# Patient Record
Sex: FEMALE | Race: WHITE | NOT HISPANIC OR LATINO | Employment: OTHER | URBAN - METROPOLITAN AREA
[De-identification: names, ages, dates, MRNs, and addresses within clinical notes are randomized per-mention and may not be internally consistent; named-entity substitution may affect disease eponyms.]

---

## 2017-01-01 ENCOUNTER — APPOINTMENT (INPATIENT)
Dept: NON INVASIVE DIAGNOSTICS | Facility: HOSPITAL | Age: 82
DRG: 907 | End: 2017-01-01
Payer: MEDICARE

## 2017-01-01 ENCOUNTER — GENERIC CONVERSION - ENCOUNTER (OUTPATIENT)
Dept: OTHER | Facility: OTHER | Age: 82
End: 2017-01-01

## 2017-01-01 ENCOUNTER — HOSPITAL ENCOUNTER (OUTPATIENT)
Dept: NON INVASIVE DIAGNOSTICS | Facility: CLINIC | Age: 82
Discharge: HOME | End: 2017-06-14
Payer: MEDICARE

## 2017-01-01 ENCOUNTER — APPOINTMENT (INPATIENT)
Dept: RADIOLOGY | Facility: HOSPITAL | Age: 82
DRG: 907 | End: 2017-01-01
Payer: MEDICARE

## 2017-01-01 ENCOUNTER — APPOINTMENT (EMERGENCY)
Dept: RADIOLOGY | Facility: HOSPITAL | Age: 82
DRG: 907 | End: 2017-01-01
Payer: MEDICARE

## 2017-01-01 ENCOUNTER — HOSPITAL ENCOUNTER (INPATIENT)
Facility: HOSPITAL | Age: 82
LOS: 7 days | DRG: 907 | End: 2017-09-01
Attending: EMERGENCY MEDICINE | Admitting: EMERGENCY MEDICINE
Payer: MEDICARE

## 2017-01-01 ENCOUNTER — ALLSCRIPTS OFFICE VISIT (OUTPATIENT)
Dept: OTHER | Facility: OTHER | Age: 82
End: 2017-01-01

## 2017-01-01 ENCOUNTER — HOSPITAL ENCOUNTER (OUTPATIENT)
Facility: HOSPITAL | Age: 82
Setting detail: OBSERVATION
Discharge: HOME HEALTH | End: 2017-04-21
Attending: EMERGENCY MEDICINE | Admitting: INTERNAL MEDICINE
Payer: MEDICARE

## 2017-01-01 ENCOUNTER — HOSPITAL ENCOUNTER (EMERGENCY)
Facility: HOSPITAL | Age: 82
Discharge: HOME | End: 2017-05-25
Attending: EMERGENCY MEDICINE | Admitting: EMERGENCY MEDICINE
Payer: MEDICARE

## 2017-01-01 ENCOUNTER — TELEPHONE (OUTPATIENT)
Dept: INPATIENT UNIT | Facility: HOSPITAL | Age: 82
End: 2017-01-01

## 2017-01-01 ENCOUNTER — HOSPITAL ENCOUNTER (OUTPATIENT)
Dept: NON INVASIVE DIAGNOSTICS | Facility: HOSPITAL | Age: 82
Discharge: HOME | End: 2017-08-22
Attending: INTERNAL MEDICINE | Admitting: INTERNAL MEDICINE
Payer: MEDICARE

## 2017-01-01 ENCOUNTER — HOSPITAL ENCOUNTER (OUTPATIENT)
Dept: NON INVASIVE DIAGNOSTICS | Facility: CLINIC | Age: 82
Discharge: HOME | End: 2017-06-01
Payer: MEDICARE

## 2017-01-01 ENCOUNTER — ANESTHESIA EVENT (INPATIENT)
Dept: PERIOP | Facility: HOSPITAL | Age: 82
DRG: 907 | End: 2017-01-01
Payer: MEDICARE

## 2017-01-01 ENCOUNTER — ANESTHESIA (INPATIENT)
Dept: PERIOP | Facility: HOSPITAL | Age: 82
DRG: 907 | End: 2017-01-01
Payer: MEDICARE

## 2017-01-01 ENCOUNTER — APPOINTMENT (EMERGENCY)
Dept: RADIOLOGY | Facility: HOSPITAL | Age: 82
End: 2017-01-01
Payer: MEDICARE

## 2017-01-01 VITALS
SYSTOLIC BLOOD PRESSURE: 119 MMHG | HEIGHT: 62 IN | TEMPERATURE: 97.6 F | DIASTOLIC BLOOD PRESSURE: 51 MMHG | WEIGHT: 141.09 LBS | BODY MASS INDEX: 25.96 KG/M2 | RESPIRATION RATE: 16 BRPM | OXYGEN SATURATION: 92 % | HEART RATE: 65 BPM

## 2017-01-01 VITALS
OXYGEN SATURATION: 94 % | WEIGHT: 136 LBS | HEART RATE: 50 BPM | TEMPERATURE: 96.2 F | BODY MASS INDEX: 25.03 KG/M2 | SYSTOLIC BLOOD PRESSURE: 151 MMHG | DIASTOLIC BLOOD PRESSURE: 59 MMHG | HEIGHT: 62 IN | RESPIRATION RATE: 18 BRPM

## 2017-01-01 VITALS
HEART RATE: 64 BPM | BODY MASS INDEX: 26.05 KG/M2 | TEMPERATURE: 98.2 F | WEIGHT: 141.54 LBS | RESPIRATION RATE: 19 BRPM | SYSTOLIC BLOOD PRESSURE: 132 MMHG | DIASTOLIC BLOOD PRESSURE: 35 MMHG | OXYGEN SATURATION: 95 % | HEIGHT: 62 IN

## 2017-01-01 VITALS
TEMPERATURE: 97.6 F | BODY MASS INDEX: 26.1 KG/M2 | WEIGHT: 142.7 LBS | DIASTOLIC BLOOD PRESSURE: 62 MMHG | RESPIRATION RATE: 18 BRPM | HEART RATE: 51 BPM | OXYGEN SATURATION: 93 % | SYSTOLIC BLOOD PRESSURE: 161 MMHG

## 2017-01-01 DIAGNOSIS — I48.0 PAF (PAROXYSMAL ATRIAL FIBRILLATION) (HCC): Chronic | ICD-10-CM

## 2017-01-01 DIAGNOSIS — I38 ENDOCARDITIS: ICD-10-CM

## 2017-01-01 DIAGNOSIS — I25.10 3-VESSEL CAD: ICD-10-CM

## 2017-01-01 DIAGNOSIS — I50.9 HEART FAILURE (HCC): ICD-10-CM

## 2017-01-01 DIAGNOSIS — I21.4 NSTEMI (NON-ST ELEVATED MYOCARDIAL INFARCTION) (HCC): Primary | ICD-10-CM

## 2017-01-01 DIAGNOSIS — D64.9 ANEMIA: ICD-10-CM

## 2017-01-01 DIAGNOSIS — R06.00 DYSPNEA: ICD-10-CM

## 2017-01-01 DIAGNOSIS — R53.83 FATIGUE: ICD-10-CM

## 2017-01-01 DIAGNOSIS — R05.9 COUGH: Primary | ICD-10-CM

## 2017-01-01 DIAGNOSIS — Z45.2 NEEDS PERIPHERALLY INSERTED CENTRAL CATHETER (PICC): ICD-10-CM

## 2017-01-01 DIAGNOSIS — R07.9 CHEST PAIN: ICD-10-CM

## 2017-01-01 DIAGNOSIS — J44.9 CHRONIC OBSTRUCTIVE PULMONARY DISEASE (HCC): ICD-10-CM

## 2017-01-01 DIAGNOSIS — I49.5 SSS (SICK SINUS SYNDROME) (HCC): ICD-10-CM

## 2017-01-01 DIAGNOSIS — I35.0 NONRHEUMATIC AORTIC VALVE STENOSIS: Chronic | ICD-10-CM

## 2017-01-01 DIAGNOSIS — R10.9 ABDOMINAL PAIN: ICD-10-CM

## 2017-01-01 DIAGNOSIS — J44.9 COPD (CHRONIC OBSTRUCTIVE PULMONARY DISEASE) (HCC): ICD-10-CM

## 2017-01-01 DIAGNOSIS — I25.10 ATHEROSCLEROTIC HEART DISEASE OF NATIVE CORONARY ARTERY WITHOUT ANGINA PECTORIS: ICD-10-CM

## 2017-01-01 DIAGNOSIS — J39.9: ICD-10-CM

## 2017-01-01 DIAGNOSIS — N17.9 AKI (ACUTE KIDNEY INJURY) (HCC): ICD-10-CM

## 2017-01-01 DIAGNOSIS — D62 ACUTE BLOOD LOSS ANEMIA: ICD-10-CM

## 2017-01-01 DIAGNOSIS — I35.0 NONRHEUMATIC AORTIC VALVE STENOSIS: Primary | Chronic | ICD-10-CM

## 2017-01-01 DIAGNOSIS — R58 RETROPERITONEAL HEMORRHAGE: Primary | ICD-10-CM

## 2017-01-01 DIAGNOSIS — R57.1 HYPOVOLEMIC SHOCK (HCC): ICD-10-CM

## 2017-01-01 LAB
ABO GROUP BLD BPU: NORMAL
ABO GROUP BLD: NORMAL
ABO GROUP BLD: NORMAL
ALBUMIN SERPL BCP-MCNC: 2.3 G/DL (ref 3.5–5)
ALBUMIN SERPL BCP-MCNC: 2.8 G/DL (ref 3.5–5)
ALP SERPL-CCNC: 104 U/L (ref 46–116)
ALP SERPL-CCNC: 121 U/L (ref 46–116)
ALT SERPL W P-5'-P-CCNC: 10 U/L (ref 12–78)
ALT SERPL W P-5'-P-CCNC: 87 U/L (ref 12–78)
ANION GAP SERPL CALCULATED.3IONS-SCNC: 12 MMOL/L (ref 4–13)
ANION GAP SERPL CALCULATED.3IONS-SCNC: 4 MMOL/L (ref 4–13)
ANION GAP SERPL CALCULATED.3IONS-SCNC: 5 MMOL/L (ref 4–13)
ANION GAP SERPL CALCULATED.3IONS-SCNC: 6 MMOL/L (ref 4–13)
ANION GAP SERPL CALCULATED.3IONS-SCNC: 7 MMOL/L (ref 4–13)
ANION GAP SERPL CALCULATED.3IONS-SCNC: 7 MMOL/L (ref 4–13)
ANION GAP SERPL CALCULATED.3IONS-SCNC: 8 MMOL/L (ref 4–13)
ANISOCYTOSIS BLD QL SMEAR: PRESENT
APTT PPP: 102 SECONDS (ref 23–35)
APTT PPP: 102 SECONDS (ref 23–35)
APTT PPP: 108 SECONDS (ref 23–35)
APTT PPP: 113 SECONDS (ref 23–35)
APTT PPP: 171 SECONDS (ref 23–35)
APTT PPP: 28 SECONDS (ref 23–35)
APTT PPP: 44 SECONDS (ref 23–35)
APTT PPP: 48 SECONDS (ref 23–35)
APTT PPP: 52 SECONDS (ref 23–35)
APTT PPP: 58 SECONDS (ref 23–35)
APTT PPP: 62 SECONDS (ref 23–35)
APTT PPP: 64 SECONDS (ref 23–35)
APTT PPP: 66 SECONDS (ref 23–35)
APTT PPP: 71 SECONDS (ref 23–35)
APTT PPP: 78 SECONDS (ref 23–35)
APTT PPP: 86 SECONDS (ref 23–35)
APTT PPP: >210 SECONDS (ref 23–35)
ARTERIAL PATENCY WRIST A: YES
AST SERPL W P-5'-P-CCNC: 19 U/L (ref 5–45)
AST SERPL W P-5'-P-CCNC: 205 U/L (ref 5–45)
ATRIAL RATE: 0 BPM
ATRIAL RATE: 47 BPM
ATRIAL RATE: 49 BPM
ATRIAL RATE: 59 BPM
ATRIAL RATE: 67 BPM
ATRIAL RATE: 83 BPM
ATRIAL RATE: 83 BPM
BASE EXCESS BLDA CALC-SCNC: -4.4 MMOL/L
BASE EXCESS BLDA CALC-SCNC: 4 MMOL/L (ref -2–3)
BASE EXCESS BLDA CALC-SCNC: 4.5 MMOL/L
BASOPHILS # BLD AUTO: 0.03 THOUSANDS/ΜL (ref 0–0.1)
BASOPHILS # BLD AUTO: 0.04 THOUSANDS/ΜL (ref 0–0.1)
BASOPHILS # BLD AUTO: 0.05 THOUSANDS/ΜL (ref 0–0.1)
BASOPHILS # BLD AUTO: 0.05 THOUSANDS/ΜL (ref 0–0.1)
BASOPHILS # BLD AUTO: 0.06 THOUSANDS/ΜL (ref 0–0.1)
BASOPHILS # BLD AUTO: 0.07 THOUSANDS/ΜL (ref 0–0.1)
BASOPHILS # BLD AUTO: 0.07 THOUSANDS/ΜL (ref 0–0.1)
BASOPHILS # BLD AUTO: 0.08 THOUSANDS/ΜL (ref 0–0.1)
BASOPHILS # BLD MANUAL: 0 THOUSAND/UL (ref 0–0.1)
BASOPHILS NFR BLD AUTO: 1 % (ref 0–1)
BASOPHILS NFR MAR MANUAL: 0 % (ref 0–1)
BILIRUB SERPL-MCNC: 0.57 MG/DL (ref 0.2–1)
BILIRUB SERPL-MCNC: 0.77 MG/DL (ref 0.2–1)
BLD GP AB SCN SERPL QL: NEGATIVE
BLD GP AB SCN SERPL QL: NEGATIVE
BPU ID: NORMAL
BUN SERPL-MCNC: 26 MG/DL (ref 5–25)
BUN SERPL-MCNC: 29 MG/DL (ref 5–25)
BUN SERPL-MCNC: 30 MG/DL (ref 5–25)
BUN SERPL-MCNC: 30 MG/DL (ref 5–25)
BUN SERPL-MCNC: 31 MG/DL (ref 5–25)
BUN SERPL-MCNC: 31 MG/DL (ref 5–25)
BUN SERPL-MCNC: 33 MG/DL (ref 5–25)
BUN SERPL-MCNC: 34 MG/DL (ref 5–25)
BUN SERPL-MCNC: 34 MG/DL (ref 5–25)
BUN SERPL-MCNC: 35 MG/DL (ref 5–25)
BUN SERPL-MCNC: 37 MG/DL (ref 5–25)
BUN SERPL-MCNC: 41 MG/DL (ref 5–25)
BUN SERPL-MCNC: 41 MG/DL (ref 5–25)
BUN SERPL-MCNC: 46 MG/DL (ref 5–25)
BURR CELLS BLD QL SMEAR: PRESENT
CA-I BLD-SCNC: 1.1 MMOL/L (ref 1.12–1.32)
CA-I BLD-SCNC: 1.19 MMOL/L (ref 1.12–1.32)
CALCIUM SERPL-MCNC: 7.5 MG/DL (ref 8.3–10.1)
CALCIUM SERPL-MCNC: 7.7 MG/DL (ref 8.3–10.1)
CALCIUM SERPL-MCNC: 7.9 MG/DL (ref 8.3–10.1)
CALCIUM SERPL-MCNC: 8 MG/DL (ref 8.3–10.1)
CALCIUM SERPL-MCNC: 8.3 MG/DL (ref 8.3–10.1)
CALCIUM SERPL-MCNC: 8.5 MG/DL (ref 8.3–10.1)
CALCIUM SERPL-MCNC: 8.6 MG/DL (ref 8.3–10.1)
CALCIUM SERPL-MCNC: 8.7 MG/DL (ref 8.3–10.1)
CALCIUM SERPL-MCNC: 8.7 MG/DL (ref 8.3–10.1)
CALCIUM SERPL-MCNC: 8.8 MG/DL (ref 8.3–10.1)
CALCIUM SERPL-MCNC: 8.9 MG/DL (ref 8.3–10.1)
CALCIUM SERPL-MCNC: 9 MG/DL (ref 8.3–10.1)
CHEST PAIN STATEMENT: NORMAL
CHLORIDE SERPL-SCNC: 101 MMOL/L (ref 100–108)
CHLORIDE SERPL-SCNC: 101 MMOL/L (ref 100–108)
CHLORIDE SERPL-SCNC: 103 MMOL/L (ref 100–108)
CHLORIDE SERPL-SCNC: 104 MMOL/L (ref 100–108)
CHLORIDE SERPL-SCNC: 104 MMOL/L (ref 100–108)
CHLORIDE SERPL-SCNC: 105 MMOL/L (ref 100–108)
CHLORIDE SERPL-SCNC: 106 MMOL/L (ref 100–108)
CHLORIDE SERPL-SCNC: 107 MMOL/L (ref 100–108)
CHLORIDE SERPL-SCNC: 108 MMOL/L (ref 100–108)
CHLORIDE SERPL-SCNC: 108 MMOL/L (ref 100–108)
CHLORIDE SERPL-SCNC: 98 MMOL/L (ref 100–108)
CHOLEST SERPL-MCNC: 133 MG/DL (ref 50–200)
CO2 SERPL-SCNC: 26 MMOL/L (ref 21–32)
CO2 SERPL-SCNC: 28 MMOL/L (ref 21–32)
CO2 SERPL-SCNC: 28 MMOL/L (ref 21–32)
CO2 SERPL-SCNC: 29 MMOL/L (ref 21–32)
CO2 SERPL-SCNC: 30 MMOL/L (ref 21–32)
CO2 SERPL-SCNC: 31 MMOL/L (ref 21–32)
CO2 SERPL-SCNC: 32 MMOL/L (ref 21–32)
CO2 SERPL-SCNC: 33 MMOL/L (ref 21–32)
CO2 SERPL-SCNC: 33 MMOL/L (ref 21–32)
CO2 SERPL-SCNC: 36 MMOL/L (ref 21–32)
CREAT SERPL-MCNC: 1.25 MG/DL (ref 0.6–1.3)
CREAT SERPL-MCNC: 1.25 MG/DL (ref 0.6–1.3)
CREAT SERPL-MCNC: 1.3 MG/DL (ref 0.6–1.3)
CREAT SERPL-MCNC: 1.33 MG/DL (ref 0.6–1.3)
CREAT SERPL-MCNC: 1.38 MG/DL (ref 0.6–1.3)
CREAT SERPL-MCNC: 1.47 MG/DL (ref 0.6–1.3)
CREAT SERPL-MCNC: 1.51 MG/DL (ref 0.6–1.3)
CREAT SERPL-MCNC: 1.51 MG/DL (ref 0.6–1.3)
CREAT SERPL-MCNC: 1.55 MG/DL (ref 0.6–1.3)
CREAT SERPL-MCNC: 1.59 MG/DL (ref 0.6–1.3)
CREAT SERPL-MCNC: 1.59 MG/DL (ref 0.6–1.3)
CREAT SERPL-MCNC: 1.62 MG/DL (ref 0.6–1.3)
CREAT SERPL-MCNC: 1.63 MG/DL (ref 0.6–1.3)
CREAT SERPL-MCNC: 1.64 MG/DL (ref 0.6–1.3)
CREAT SERPL-MCNC: 1.77 MG/DL (ref 0.6–1.3)
CREAT SERPL-MCNC: 1.86 MG/DL (ref 0.6–1.3)
CROSSMATCH: NORMAL
DS:DELIVERY SYSTEM: 1
EOSINOPHIL # BLD AUTO: 0.16 THOUSAND/ΜL (ref 0–0.61)
EOSINOPHIL # BLD AUTO: 0.19 THOUSAND/ΜL (ref 0–0.61)
EOSINOPHIL # BLD AUTO: 0.26 THOUSAND/ΜL (ref 0–0.61)
EOSINOPHIL # BLD AUTO: 0.3 THOUSAND/ΜL (ref 0–0.61)
EOSINOPHIL # BLD AUTO: 0.35 THOUSAND/ΜL (ref 0–0.61)
EOSINOPHIL # BLD AUTO: 0.39 THOUSAND/ΜL (ref 0–0.61)
EOSINOPHIL # BLD AUTO: 0.46 THOUSAND/ΜL (ref 0–0.61)
EOSINOPHIL # BLD AUTO: 0.5 THOUSAND/ΜL (ref 0–0.61)
EOSINOPHIL # BLD MANUAL: 0.21 THOUSAND/UL (ref 0–0.4)
EOSINOPHIL NFR BLD AUTO: 2 % (ref 0–6)
EOSINOPHIL NFR BLD AUTO: 3 % (ref 0–6)
EOSINOPHIL NFR BLD AUTO: 3 % (ref 0–6)
EOSINOPHIL NFR BLD AUTO: 4 % (ref 0–6)
EOSINOPHIL NFR BLD AUTO: 6 % (ref 0–6)
EOSINOPHIL NFR BLD AUTO: 6 % (ref 0–6)
EOSINOPHIL NFR BLD AUTO: 7 % (ref 0–6)
EOSINOPHIL NFR BLD AUTO: 7 % (ref 0–6)
EOSINOPHIL NFR BLD MANUAL: 2 % (ref 0–6)
ERYTHROCYTE [DISTWIDTH] IN BLOOD BY AUTOMATED COUNT: 14.5 % (ref 11.6–15.1)
ERYTHROCYTE [DISTWIDTH] IN BLOOD BY AUTOMATED COUNT: 14.6 % (ref 11.6–15.1)
ERYTHROCYTE [DISTWIDTH] IN BLOOD BY AUTOMATED COUNT: 14.9 % (ref 11.6–15.1)
ERYTHROCYTE [DISTWIDTH] IN BLOOD BY AUTOMATED COUNT: 15 % (ref 11.6–15.1)
ERYTHROCYTE [DISTWIDTH] IN BLOOD BY AUTOMATED COUNT: 15 % (ref 11.6–15.1)
ERYTHROCYTE [DISTWIDTH] IN BLOOD BY AUTOMATED COUNT: 15.1 % (ref 11.6–15.1)
ERYTHROCYTE [DISTWIDTH] IN BLOOD BY AUTOMATED COUNT: 15.2 % (ref 11.6–15.1)
ERYTHROCYTE [DISTWIDTH] IN BLOOD BY AUTOMATED COUNT: 15.3 % (ref 11.6–15.1)
ERYTHROCYTE [DISTWIDTH] IN BLOOD BY AUTOMATED COUNT: 15.3 % (ref 11.6–15.1)
ERYTHROCYTE [DISTWIDTH] IN BLOOD BY AUTOMATED COUNT: 15.5 % (ref 11.6–15.1)
ERYTHROCYTE [DISTWIDTH] IN BLOOD BY AUTOMATED COUNT: 15.9 % (ref 11.6–15.1)
ERYTHROCYTE [DISTWIDTH] IN BLOOD BY AUTOMATED COUNT: 15.9 % (ref 11.6–15.1)
ERYTHROCYTE [DISTWIDTH] IN BLOOD BY AUTOMATED COUNT: 16 % (ref 11.6–15.1)
ERYTHROCYTE [DISTWIDTH] IN BLOOD BY AUTOMATED COUNT: 16.2 % (ref 11.6–15.1)
ERYTHROCYTE [DISTWIDTH] IN BLOOD BY AUTOMATED COUNT: 16.3 % (ref 11.6–15.1)
GFR SERPL CREATININE-BSD FRML MDRD: 25.9 ML/MIN/1.73SQ M
GFR SERPL CREATININE-BSD FRML MDRD: 26 ML/MIN/1.73SQ M
GFR SERPL CREATININE-BSD FRML MDRD: 29 ML/MIN/1.73SQ M
GFR SERPL CREATININE-BSD FRML MDRD: 29 ML/MIN/1.73SQ M
GFR SERPL CREATININE-BSD FRML MDRD: 29.9 ML/MIN/1.73SQ M
GFR SERPL CREATININE-BSD FRML MDRD: 30 ML/MIN/1.73SQ M
GFR SERPL CREATININE-BSD FRML MDRD: 30 ML/MIN/1.73SQ M
GFR SERPL CREATININE-BSD FRML MDRD: 31 ML/MIN/1.73SQ M
GFR SERPL CREATININE-BSD FRML MDRD: 32 ML/MIN/1.73SQ M
GFR SERPL CREATININE-BSD FRML MDRD: 32 ML/MIN/1.73SQ M
GFR SERPL CREATININE-BSD FRML MDRD: 33.9 ML/MIN/1.73SQ M
GFR SERPL CREATININE-BSD FRML MDRD: 35 ML/MIN/1.73SQ M
GFR SERPL CREATININE-BSD FRML MDRD: 37 ML/MIN/1.73SQ M
GFR SERPL CREATININE-BSD FRML MDRD: 38 ML/MIN/1.73SQ M
GFR SERPL CREATININE-BSD FRML MDRD: 40 ML/MIN/1.73SQ M
GFR SERPL CREATININE-BSD FRML MDRD: 40 ML/MIN/1.73SQ M
GLUCOSE P FAST SERPL-MCNC: 80 MG/DL (ref 65–99)
GLUCOSE SERPL-MCNC: 102 MG/DL (ref 65–140)
GLUCOSE SERPL-MCNC: 108 MG/DL (ref 65–140)
GLUCOSE SERPL-MCNC: 110 MG/DL (ref 65–140)
GLUCOSE SERPL-MCNC: 111 MG/DL (ref 65–140)
GLUCOSE SERPL-MCNC: 117 MG/DL (ref 65–140)
GLUCOSE SERPL-MCNC: 118 MG/DL (ref 65–140)
GLUCOSE SERPL-MCNC: 119 MG/DL (ref 65–140)
GLUCOSE SERPL-MCNC: 133 MG/DL (ref 65–140)
GLUCOSE SERPL-MCNC: 209 MG/DL (ref 65–140)
GLUCOSE SERPL-MCNC: 320 MG/DL (ref 65–140)
GLUCOSE SERPL-MCNC: 354 MG/DL (ref 65–140)
GLUCOSE SERPL-MCNC: 78 MG/DL (ref 65–140)
GLUCOSE SERPL-MCNC: 80 MG/DL (ref 65–140)
GLUCOSE SERPL-MCNC: 80 MG/DL (ref 65–140)
GLUCOSE SERPL-MCNC: 84 MG/DL (ref 65–140)
GLUCOSE SERPL-MCNC: 85 MG/DL (ref 65–140)
GLUCOSE SERPL-MCNC: 86 MG/DL (ref 65–140)
GLUCOSE SERPL-MCNC: 97 MG/DL (ref 65–140)
GLUCOSE SERPL-MCNC: 97 MG/DL (ref 65–140)
HCO3 BLDA-SCNC: 22 MMOL/L (ref 22–28)
HCO3 BLDA-SCNC: 29.6 MMOL/L (ref 22–28)
HCO3 BLDA-SCNC: 30.8 MMOL/L (ref 22–28)
HCT VFR BLD AUTO: 22.9 % (ref 34.8–46.1)
HCT VFR BLD AUTO: 24.5 % (ref 34.8–46.1)
HCT VFR BLD AUTO: 24.8 % (ref 34.8–46.1)
HCT VFR BLD AUTO: 25.3 % (ref 34.8–46.1)
HCT VFR BLD AUTO: 26 % (ref 34.8–46.1)
HCT VFR BLD AUTO: 26.1 % (ref 34.8–46.1)
HCT VFR BLD AUTO: 26.8 % (ref 34.8–46.1)
HCT VFR BLD AUTO: 27.1 % (ref 34.8–46.1)
HCT VFR BLD AUTO: 27.6 % (ref 34.8–46.1)
HCT VFR BLD AUTO: 28.1 % (ref 34.8–46.1)
HCT VFR BLD AUTO: 29 % (ref 34.8–46.1)
HCT VFR BLD AUTO: 29.3 % (ref 34.8–46.1)
HCT VFR BLD AUTO: 29.3 % (ref 34.8–46.1)
HCT VFR BLD AUTO: 30 % (ref 34.8–46.1)
HCT VFR BLD AUTO: 31.6 % (ref 34.8–46.1)
HCT VFR BLD AUTO: 33.5 % (ref 34.8–46.1)
HCT VFR BLD AUTO: 33.9 % (ref 34.8–46.1)
HCT VFR BLD AUTO: 34.1 % (ref 34.8–46.1)
HCT VFR BLD AUTO: 39.5 % (ref 34.8–46.1)
HCT VFR BLD CALC: 27 % (ref 34.8–46.1)
HDLC SERPL-MCNC: 60 MG/DL (ref 40–60)
HGB BLD-MCNC: 10.4 G/DL (ref 11.5–15.4)
HGB BLD-MCNC: 10.8 G/DL (ref 11.5–15.4)
HGB BLD-MCNC: 10.9 G/DL (ref 11.5–15.4)
HGB BLD-MCNC: 11.1 G/DL (ref 11.5–15.4)
HGB BLD-MCNC: 12.1 G/DL (ref 11.5–15.4)
HGB BLD-MCNC: 7.1 G/DL (ref 11.5–15.4)
HGB BLD-MCNC: 8 G/DL (ref 11.5–15.4)
HGB BLD-MCNC: 8 G/DL (ref 11.5–15.4)
HGB BLD-MCNC: 8.4 G/DL (ref 11.5–15.4)
HGB BLD-MCNC: 8.5 G/DL (ref 11.5–15.4)
HGB BLD-MCNC: 8.5 G/DL (ref 11.5–15.4)
HGB BLD-MCNC: 8.6 G/DL (ref 11.5–15.4)
HGB BLD-MCNC: 8.6 G/DL (ref 11.5–15.4)
HGB BLD-MCNC: 8.7 G/DL (ref 11.5–15.4)
HGB BLD-MCNC: 8.9 G/DL (ref 11.5–15.4)
HGB BLD-MCNC: 9 G/DL (ref 11.5–15.4)
HGB BLD-MCNC: 9.2 G/DL (ref 11.5–15.4)
HGB BLD-MCNC: 9.4 G/DL (ref 11.5–15.4)
HGB BLD-MCNC: 9.6 G/DL (ref 11.5–15.4)
HGB BLDA-MCNC: 9.2 G/DL (ref 11.5–15.4)
HOROWITZ INDEX BLDA+IHG-RTO: 60 MM[HG]
INR PPP: 1.09 (ref 0.86–1.16)
INR PPP: 1.09 (ref 0.86–1.16)
INR PPP: 1.3 (ref 0.86–1.16)
INR PPP: 1.88 (ref 0.86–1.16)
INR PPP: 2 (ref 0.86–1.16)
INR PPP: 2.06 (ref 0.86–1.16)
INR PPP: 2.15 (ref 0.86–1.16)
INR PPP: 2.34 (ref 0.86–1.16)
INR PPP: 2.92 (ref 0.86–1.16)
INR PPP: 3.94 (ref 0.86–1.16)
INR PPP: 3.97 (ref 0.86–1.16)
INR PPP: 4.3 (ref 0.86–1.16)
LACTATE SERPL-SCNC: 1 MMOL/L (ref 0.5–2)
LACTATE SERPL-SCNC: 1.9 MMOL/L (ref 0.5–2)
LDLC SERPL CALC-MCNC: 58 MG/DL (ref 0–100)
LIPASE SERPL-CCNC: 120 U/L (ref 73–393)
LYMPHOCYTES # BLD AUTO: 0.39 THOUSANDS/ΜL (ref 0.6–4.47)
LYMPHOCYTES # BLD AUTO: 0.65 THOUSANDS/ΜL (ref 0.6–4.47)
LYMPHOCYTES # BLD AUTO: 0.65 THOUSANDS/ΜL (ref 0.6–4.47)
LYMPHOCYTES # BLD AUTO: 0.67 THOUSANDS/ΜL (ref 0.6–4.47)
LYMPHOCYTES # BLD AUTO: 0.72 THOUSANDS/ΜL (ref 0.6–4.47)
LYMPHOCYTES # BLD AUTO: 0.76 THOUSANDS/ΜL (ref 0.6–4.47)
LYMPHOCYTES # BLD AUTO: 0.87 THOUSANDS/ΜL (ref 0.6–4.47)
LYMPHOCYTES # BLD AUTO: 1.37 THOUSANDS/ΜL (ref 0.6–4.47)
LYMPHOCYTES # BLD AUTO: 3.61 THOUSAND/UL (ref 0.6–4.47)
LYMPHOCYTES # BLD AUTO: 34 % (ref 14–44)
LYMPHOCYTES NFR BLD AUTO: 10 % (ref 14–44)
LYMPHOCYTES NFR BLD AUTO: 11 % (ref 14–44)
LYMPHOCYTES NFR BLD AUTO: 12 % (ref 14–44)
LYMPHOCYTES NFR BLD AUTO: 14 % (ref 14–44)
LYMPHOCYTES NFR BLD AUTO: 20 % (ref 14–44)
LYMPHOCYTES NFR BLD AUTO: 4 % (ref 14–44)
LYMPHOCYTES NFR BLD AUTO: 7 % (ref 14–44)
LYMPHOCYTES NFR BLD AUTO: 9 % (ref 14–44)
MAGNESIUM SERPL-MCNC: 2.3 MG/DL (ref 1.6–2.6)
MAGNESIUM SERPL-MCNC: 2.4 MG/DL (ref 1.6–2.6)
MAGNESIUM SERPL-MCNC: 2.5 MG/DL (ref 1.6–2.6)
MAGNESIUM SERPL-MCNC: 2.6 MG/DL (ref 1.6–2.6)
MAGNESIUM SERPL-MCNC: 2.7 MG/DL (ref 1.6–2.6)
MAX DIASTOLIC BP: 68 MMHG
MAX HEART RATE: 60 BPM
MAX PREDICTED HEART RATE: 137 BPM
MAX. SYSTOLIC BP: 144 MMHG
MCH RBC QN AUTO: 28.4 PG (ref 26.8–34.3)
MCH RBC QN AUTO: 29 PG (ref 26.8–34.3)
MCH RBC QN AUTO: 29.5 PG (ref 26.8–34.3)
MCH RBC QN AUTO: 29.5 PG (ref 26.8–34.3)
MCH RBC QN AUTO: 29.7 PG (ref 26.8–34.3)
MCH RBC QN AUTO: 29.8 PG (ref 26.8–34.3)
MCH RBC QN AUTO: 29.9 PG (ref 26.8–34.3)
MCH RBC QN AUTO: 30 PG (ref 26.8–34.3)
MCH RBC QN AUTO: 30.2 PG (ref 26.8–34.3)
MCH RBC QN AUTO: 30.3 PG (ref 26.8–34.3)
MCH RBC QN AUTO: 30.4 PG (ref 26.8–34.3)
MCH RBC QN AUTO: 30.5 PG (ref 26.8–34.3)
MCH RBC QN AUTO: 30.5 PG (ref 26.8–34.3)
MCHC RBC AUTO-ENTMCNC: 30.6 G/DL (ref 31.4–37.4)
MCHC RBC AUTO-ENTMCNC: 30.7 G/DL (ref 31.4–37.4)
MCHC RBC AUTO-ENTMCNC: 31 G/DL (ref 31.4–37.4)
MCHC RBC AUTO-ENTMCNC: 31.2 G/DL (ref 31.4–37.4)
MCHC RBC AUTO-ENTMCNC: 31.3 G/DL (ref 31.4–37.4)
MCHC RBC AUTO-ENTMCNC: 31.7 G/DL (ref 31.4–37.4)
MCHC RBC AUTO-ENTMCNC: 31.7 G/DL (ref 31.4–37.4)
MCHC RBC AUTO-ENTMCNC: 32.1 G/DL (ref 31.4–37.4)
MCHC RBC AUTO-ENTMCNC: 32.2 G/DL (ref 31.4–37.4)
MCHC RBC AUTO-ENTMCNC: 32.2 G/DL (ref 31.4–37.4)
MCHC RBC AUTO-ENTMCNC: 32.3 G/DL (ref 31.4–37.4)
MCHC RBC AUTO-ENTMCNC: 32.6 G/DL (ref 31.4–37.4)
MCHC RBC AUTO-ENTMCNC: 32.7 G/DL (ref 31.4–37.4)
MCHC RBC AUTO-ENTMCNC: 32.9 G/DL (ref 31.4–37.4)
MCHC RBC AUTO-ENTMCNC: 33.5 G/DL (ref 31.4–37.4)
MCV RBC AUTO: 91 FL (ref 82–98)
MCV RBC AUTO: 91 FL (ref 82–98)
MCV RBC AUTO: 92 FL (ref 82–98)
MCV RBC AUTO: 92 FL (ref 82–98)
MCV RBC AUTO: 93 FL (ref 82–98)
MCV RBC AUTO: 94 FL (ref 82–98)
MCV RBC AUTO: 95 FL (ref 82–98)
MCV RBC AUTO: 95 FL (ref 82–98)
MCV RBC AUTO: 98 FL (ref 82–98)
MCV RBC AUTO: 99 FL (ref 82–98)
MONOCYTES # BLD AUTO: 0.11 THOUSAND/UL (ref 0–1.22)
MONOCYTES # BLD AUTO: 0.53 THOUSAND/ΜL (ref 0.17–1.22)
MONOCYTES # BLD AUTO: 0.59 THOUSAND/ΜL (ref 0.17–1.22)
MONOCYTES # BLD AUTO: 0.61 THOUSAND/ΜL (ref 0.17–1.22)
MONOCYTES # BLD AUTO: 0.65 THOUSAND/ΜL (ref 0.17–1.22)
MONOCYTES # BLD AUTO: 0.69 THOUSAND/ΜL (ref 0.17–1.22)
MONOCYTES # BLD AUTO: 0.8 THOUSAND/ΜL (ref 0.17–1.22)
MONOCYTES # BLD AUTO: 0.94 THOUSAND/ΜL (ref 0.17–1.22)
MONOCYTES # BLD AUTO: 1 THOUSAND/ΜL (ref 0.17–1.22)
MONOCYTES NFR BLD AUTO: 10 % (ref 4–12)
MONOCYTES NFR BLD AUTO: 11 % (ref 4–12)
MONOCYTES NFR BLD AUTO: 11 % (ref 4–12)
MONOCYTES NFR BLD AUTO: 12 % (ref 4–12)
MONOCYTES NFR BLD AUTO: 9 % (ref 4–12)
MONOCYTES NFR BLD: 1 % (ref 4–12)
NEUTROPHILS # BLD AUTO: 3.95 THOUSANDS/ΜL (ref 1.85–7.62)
NEUTROPHILS # BLD AUTO: 4.26 THOUSANDS/ΜL (ref 1.85–7.62)
NEUTROPHILS # BLD AUTO: 4.27 THOUSANDS/ΜL (ref 1.85–7.62)
NEUTROPHILS # BLD AUTO: 4.83 THOUSANDS/ΜL (ref 1.85–7.62)
NEUTROPHILS # BLD AUTO: 5.05 THOUSANDS/ΜL (ref 1.85–7.62)
NEUTROPHILS # BLD AUTO: 5.81 THOUSANDS/ΜL (ref 1.85–7.62)
NEUTROPHILS # BLD AUTO: 6.81 THOUSANDS/ΜL (ref 1.85–7.62)
NEUTROPHILS # BLD AUTO: 7.83 THOUSANDS/ΜL (ref 1.85–7.62)
NEUTROPHILS # BLD MANUAL: 6.69 THOUSAND/UL (ref 1.85–7.62)
NEUTS SEG NFR BLD AUTO: 63 % (ref 43–75)
NEUTS SEG NFR BLD AUTO: 64 % (ref 43–75)
NEUTS SEG NFR BLD AUTO: 70 % (ref 43–75)
NEUTS SEG NFR BLD AUTO: 71 % (ref 43–75)
NEUTS SEG NFR BLD AUTO: 72 % (ref 43–75)
NEUTS SEG NFR BLD AUTO: 72 % (ref 43–75)
NEUTS SEG NFR BLD AUTO: 77 % (ref 43–75)
NEUTS SEG NFR BLD AUTO: 78 % (ref 43–75)
NEUTS SEG NFR BLD AUTO: 83 % (ref 43–75)
NRBC BLD AUTO-RTO: 0 /100 WBCS
NRBC BLD AUTO-RTO: 3 /100 WBCS
NRBC BLD AUTO-RTO: 4 /100 WBC (ref 0–2)
NT-PROBNP SERPL-MCNC: ABNORMAL PG/ML
O2 CT BLDA-SCNC: 12.3 ML/DL (ref 16–23)
O2 CT BLDA-SCNC: 12.7 ML/DL (ref 16–23)
OXYHGB MFR BLDA: 90.4 % (ref 94–97)
OXYHGB MFR BLDA: 94.1 % (ref 94–97)
P AXIS: 107 DEGREES
P AXIS: 44 DEGREES
P AXIS: 56 DEGREES
P AXIS: 76 DEGREES
P AXIS: 93 DEGREES
P AXIS: 95 DEGREES
PCO2 BLD: 33 MMOL/L (ref 21–32)
PCO2 BLD: 70.2 MM HG (ref 36–44)
PCO2 BLDA: 45.9 MM HG (ref 36–44)
PCO2 BLDA: 47.3 MM HG (ref 36–44)
PEEP RESPIRATORY: 5 CM[H2O]
PH BLD: 7.25 [PH] (ref 7.35–7.45)
PH BLDA: 7.3 [PH] (ref 7.35–7.45)
PH BLDA: 7.42 [PH] (ref 7.35–7.45)
PHOSPHATE SERPL-MCNC: 3.1 MG/DL (ref 2.3–4.1)
PHOSPHATE SERPL-MCNC: 3.5 MG/DL (ref 2.3–4.1)
PHOSPHATE SERPL-MCNC: 5.6 MG/DL (ref 2.3–4.1)
PLATELET # BLD AUTO: 143 THOUSANDS/UL (ref 149–390)
PLATELET # BLD AUTO: 159 THOUSANDS/UL (ref 149–390)
PLATELET # BLD AUTO: 161 THOUSANDS/UL (ref 149–390)
PLATELET # BLD AUTO: 178 THOUSANDS/UL (ref 149–390)
PLATELET # BLD AUTO: 180 THOUSANDS/UL (ref 149–390)
PLATELET # BLD AUTO: 189 THOUSANDS/UL (ref 149–390)
PLATELET # BLD AUTO: 202 THOUSANDS/UL (ref 149–390)
PLATELET # BLD AUTO: 207 THOUSANDS/UL (ref 149–390)
PLATELET # BLD AUTO: 211 THOUSANDS/UL (ref 149–390)
PLATELET # BLD AUTO: 211 THOUSANDS/UL (ref 149–390)
PLATELET # BLD AUTO: 219 THOUSANDS/UL (ref 149–390)
PLATELET # BLD AUTO: 254 THOUSANDS/UL (ref 149–390)
PLATELET # BLD AUTO: 260 THOUSANDS/UL (ref 149–390)
PLATELET # BLD AUTO: 271 THOUSANDS/UL (ref 149–390)
PLATELET # BLD AUTO: 75 THOUSANDS/UL (ref 149–390)
PLATELET BLD QL SMEAR: ABNORMAL
PMV BLD AUTO: 8.8 FL (ref 8.9–12.7)
PMV BLD AUTO: 9 FL (ref 8.9–12.7)
PMV BLD AUTO: 9.4 FL (ref 8.9–12.7)
PMV BLD AUTO: 9.5 FL (ref 8.9–12.7)
PMV BLD AUTO: 9.6 FL (ref 8.9–12.7)
PMV BLD AUTO: 9.7 FL (ref 8.9–12.7)
PMV BLD AUTO: 9.7 FL (ref 8.9–12.7)
PMV BLD AUTO: 9.9 FL (ref 8.9–12.7)
PMV BLD AUTO: 9.9 FL (ref 8.9–12.7)
PO2 BLD: 300 MM HG (ref 75–129)
PO2 BLDA: 73.7 MM HG (ref 75–129)
PO2 BLDA: 75.3 MM HG (ref 75–129)
POIKILOCYTOSIS BLD QL SMEAR: PRESENT
POTASSIUM BLD-SCNC: 4.8 MMOL/L (ref 3.5–5.3)
POTASSIUM SERPL-SCNC: 3.7 MMOL/L (ref 3.5–5.3)
POTASSIUM SERPL-SCNC: 3.8 MMOL/L (ref 3.5–5.3)
POTASSIUM SERPL-SCNC: 4 MMOL/L (ref 3.5–5.3)
POTASSIUM SERPL-SCNC: 4.1 MMOL/L (ref 3.5–5.3)
POTASSIUM SERPL-SCNC: 4.2 MMOL/L (ref 3.5–5.3)
POTASSIUM SERPL-SCNC: 4.4 MMOL/L (ref 3.5–5.3)
POTASSIUM SERPL-SCNC: 4.5 MMOL/L (ref 3.5–5.3)
POTASSIUM SERPL-SCNC: 4.5 MMOL/L (ref 3.5–5.3)
POTASSIUM SERPL-SCNC: 4.8 MMOL/L (ref 3.5–5.3)
POTASSIUM SERPL-SCNC: 4.9 MMOL/L (ref 3.5–5.3)
POTASSIUM SERPL-SCNC: 5 MMOL/L (ref 3.5–5.3)
POTASSIUM SERPL-SCNC: 5.1 MMOL/L (ref 3.5–5.3)
PR INTERVAL: 164 MS
PR INTERVAL: 170 MS
PR INTERVAL: 186 MS
PR INTERVAL: 196 MS
PR INTERVAL: 208 MS
PR INTERVAL: 350 MS
PROT SERPL-MCNC: 5.6 G/DL (ref 6.4–8.2)
PROT SERPL-MCNC: 5.9 G/DL (ref 6.4–8.2)
PROTHROMBIN TIME: 14.1 SECONDS (ref 12.1–14.4)
PROTHROMBIN TIME: 14.1 SECONDS (ref 12.1–14.4)
PROTHROMBIN TIME: 16.3 SECONDS (ref 12.1–14.4)
PROTHROMBIN TIME: 21.8 SECONDS (ref 12.1–14.4)
PROTHROMBIN TIME: 22.9 SECONDS (ref 12.1–14.4)
PROTHROMBIN TIME: 23.4 SECONDS (ref 12.1–14.4)
PROTHROMBIN TIME: 24.2 SECONDS (ref 12.1–14.4)
PROTHROMBIN TIME: 25.9 SECONDS (ref 12.1–14.4)
PROTHROMBIN TIME: 30.9 SECONDS (ref 12.1–14.4)
PROTHROMBIN TIME: 37.6 SECONDS (ref 12–14.3)
PROTHROMBIN TIME: 37.8 SECONDS (ref 12–14.3)
PROTHROMBIN TIME: 42 SECONDS (ref 12.1–14.4)
PROTOCOL NAME: NORMAL
QRS AXIS: -12 DEGREES
QRS AXIS: -34 DEGREES
QRS AXIS: -69 DEGREES
QRS AXIS: -7 DEGREES
QRS AXIS: 0 DEGREES
QRS AXIS: 38 DEGREES
QRS AXIS: 39 DEGREES
QRSD INTERVAL: 0 MS
QRSD INTERVAL: 160 MS
QRSD INTERVAL: 168 MS
QRSD INTERVAL: 171 MS
QRSD INTERVAL: 174 MS
QRSD INTERVAL: 178 MS
QRSD INTERVAL: 180 MS
QT INTERVAL: 0 MS
QT INTERVAL: 408 MS
QT INTERVAL: 464 MS
QT INTERVAL: 470 MS
QT INTERVAL: 516 MS
QT INTERVAL: 556 MS
QT INTERVAL: 592 MS
QTC INTERVAL: 0 MS
QTC INTERVAL: 465 MS
QTC INTERVAL: 479 MS
QTC INTERVAL: 489 MS
QTC INTERVAL: 502 MS
QTC INTERVAL: 523 MS
QTC INTERVAL: 539 MS
RBC # BLD AUTO: 2.62 MILLION/UL (ref 3.81–5.12)
RBC # BLD AUTO: 2.65 MILLION/UL (ref 3.81–5.12)
RBC # BLD AUTO: 2.82 MILLION/UL (ref 3.81–5.12)
RBC # BLD AUTO: 2.83 MILLION/UL (ref 3.81–5.12)
RBC # BLD AUTO: 2.86 MILLION/UL (ref 3.81–5.12)
RBC # BLD AUTO: 2.9 MILLION/UL (ref 3.81–5.12)
RBC # BLD AUTO: 2.96 MILLION/UL (ref 3.81–5.12)
RBC # BLD AUTO: 3 MILLION/UL (ref 3.81–5.12)
RBC # BLD AUTO: 3.04 MILLION/UL (ref 3.81–5.12)
RBC # BLD AUTO: 3.19 MILLION/UL (ref 3.81–5.12)
RBC # BLD AUTO: 3.47 MILLION/UL (ref 3.81–5.12)
RBC # BLD AUTO: 3.62 MILLION/UL (ref 3.81–5.12)
RBC # BLD AUTO: 3.69 MILLION/UL (ref 3.81–5.12)
RBC # BLD AUTO: 3.71 MILLION/UL (ref 3.81–5.12)
RBC # BLD AUTO: 4.26 MILLION/UL (ref 3.81–5.12)
RBC MORPH BLD: PRESENT
REASON FOR TERMINATION: NORMAL
RH BLD: POSITIVE
RH BLD: POSITIVE
SAO2 % BLD FROM PO2: 100 % (ref 95–98)
SODIUM BLD-SCNC: 140 MMOL/L (ref 136–145)
SODIUM SERPL-SCNC: 139 MMOL/L (ref 136–145)
SODIUM SERPL-SCNC: 140 MMOL/L (ref 136–145)
SODIUM SERPL-SCNC: 141 MMOL/L (ref 136–145)
SODIUM SERPL-SCNC: 141 MMOL/L (ref 136–145)
SODIUM SERPL-SCNC: 142 MMOL/L (ref 136–145)
SODIUM SERPL-SCNC: 143 MMOL/L (ref 136–145)
SODIUM SERPL-SCNC: 144 MMOL/L (ref 136–145)
SPECIMEN EXPIRATION DATE: NORMAL
SPECIMEN EXPIRATION DATE: NORMAL
SPECIMEN SOURCE: ABNORMAL
SPECIMEN SOURCE: NORMAL
T WAVE AXIS: 0 DEGREES
T WAVE AXIS: 132 DEGREES
T WAVE AXIS: 135 DEGREES
T WAVE AXIS: 155 DEGREES
T WAVE AXIS: 160 DEGREES
T WAVE AXIS: 171 DEGREES
T WAVE AXIS: 199 DEGREES
TARGET HR FORMULA: NORMAL
TEST INDICATION: NORMAL
TIME IN EXERCISE PHASE: 180 S
TRIGL SERPL-MCNC: 73 MG/DL
TROPONIN I BLD-MCNC: 0.06 NG/ML (ref 0–0.08)
TROPONIN I BLD-MCNC: 0.19 NG/ML (ref 0–0.08)
TROPONIN I SERPL-MCNC: 0.1 NG/ML
TROPONIN I SERPL-MCNC: 0.11 NG/ML
TROPONIN I SERPL-MCNC: 0.21 NG/ML
TROPONIN I SERPL-MCNC: 0.23 NG/ML
UNIT DISPENSE STATUS: NORMAL
UNIT PRODUCT CODE: NORMAL
UNIT RH: NORMAL
VENT AC: 20
VENT- AC: AC
VENTRICULAR RATE: 0 BPM
VENTRICULAR RATE: 47 BPM
VENTRICULAR RATE: 49 BPM
VENTRICULAR RATE: 54 BPM
VENTRICULAR RATE: 59 BPM
VENTRICULAR RATE: 81 BPM
VENTRICULAR RATE: 83 BPM
VT SETTING VENT: 450 ML
WBC # BLD AUTO: 10.62 THOUSAND/UL (ref 4.31–10.16)
WBC # BLD AUTO: 11.35 THOUSAND/UL (ref 4.31–10.16)
WBC # BLD AUTO: 5.08 THOUSAND/UL (ref 4.31–10.16)
WBC # BLD AUTO: 5.24 THOUSAND/UL (ref 4.31–10.16)
WBC # BLD AUTO: 5.44 THOUSAND/UL (ref 4.31–10.16)
WBC # BLD AUTO: 5.91 THOUSAND/UL (ref 4.31–10.16)
WBC # BLD AUTO: 6.09 THOUSAND/UL (ref 4.31–10.16)
WBC # BLD AUTO: 6.7 THOUSAND/UL (ref 4.31–10.16)
WBC # BLD AUTO: 6.78 THOUSAND/UL (ref 4.31–10.16)
WBC # BLD AUTO: 6.96 THOUSAND/UL (ref 4.31–10.16)
WBC # BLD AUTO: 7.09 THOUSAND/UL (ref 4.31–10.16)
WBC # BLD AUTO: 7.54 THOUSAND/UL (ref 4.31–10.16)
WBC # BLD AUTO: 8.16 THOUSAND/UL (ref 4.31–10.16)
WBC # BLD AUTO: 8.87 THOUSAND/UL (ref 4.31–10.16)
WBC # BLD AUTO: 9.45 THOUSAND/UL (ref 4.31–10.16)

## 2017-01-01 PROCEDURE — 93005 ELECTROCARDIOGRAM TRACING: CPT | Performed by: EMERGENCY MEDICINE

## 2017-01-01 PROCEDURE — 85730 THROMBOPLASTIN TIME PARTIAL: CPT | Performed by: SURGERY

## 2017-01-01 PROCEDURE — 85610 PROTHROMBIN TIME: CPT | Performed by: EMERGENCY MEDICINE

## 2017-01-01 PROCEDURE — 86901 BLOOD TYPING SEROLOGIC RH(D): CPT | Performed by: EMERGENCY MEDICINE

## 2017-01-01 PROCEDURE — 85027 COMPLETE CBC AUTOMATED: CPT | Performed by: NURSE PRACTITIONER

## 2017-01-01 PROCEDURE — 85007 BL SMEAR W/DIFF WBC COUNT: CPT | Performed by: PHYSICIAN ASSISTANT

## 2017-01-01 PROCEDURE — 85025 COMPLETE CBC W/AUTO DIFF WBC: CPT | Performed by: EMERGENCY MEDICINE

## 2017-01-01 PROCEDURE — 82948 REAGENT STRIP/BLOOD GLUCOSE: CPT

## 2017-01-01 PROCEDURE — 85610 PROTHROMBIN TIME: CPT | Performed by: NURSE PRACTITIONER

## 2017-01-01 PROCEDURE — 80053 COMPREHEN METABOLIC PANEL: CPT | Performed by: STUDENT IN AN ORGANIZED HEALTH CARE EDUCATION/TRAINING PROGRAM

## 2017-01-01 PROCEDURE — 80053 COMPREHEN METABOLIC PANEL: CPT | Performed by: EMERGENCY MEDICINE

## 2017-01-01 PROCEDURE — 93005 ELECTROCARDIOGRAM TRACING: CPT | Performed by: PHYSICIAN ASSISTANT

## 2017-01-01 PROCEDURE — 85014 HEMATOCRIT: CPT

## 2017-01-01 PROCEDURE — G8987 SELF CARE CURRENT STATUS: HCPCS

## 2017-01-01 PROCEDURE — 84132 ASSAY OF SERUM POTASSIUM: CPT

## 2017-01-01 PROCEDURE — 94640 AIRWAY INHALATION TREATMENT: CPT

## 2017-01-01 PROCEDURE — 84484 ASSAY OF TROPONIN QUANT: CPT | Performed by: PHYSICIAN ASSISTANT

## 2017-01-01 PROCEDURE — 04QL0ZZ REPAIR LEFT FEMORAL ARTERY, OPEN APPROACH: ICD-10-PCS | Performed by: SURGERY

## 2017-01-01 PROCEDURE — 80048 BASIC METABOLIC PNL TOTAL CA: CPT | Performed by: INTERNAL MEDICINE

## 2017-01-01 PROCEDURE — 85730 THROMBOPLASTIN TIME PARTIAL: CPT | Performed by: HOSPITALIST

## 2017-01-01 PROCEDURE — 85014 HEMATOCRIT: CPT | Performed by: NURSE PRACTITIONER

## 2017-01-01 PROCEDURE — 36415 COLL VENOUS BLD VENIPUNCTURE: CPT | Performed by: EMERGENCY MEDICINE

## 2017-01-01 PROCEDURE — C1751 CATH, INF, PER/CENT/MIDLINE: HCPCS

## 2017-01-01 PROCEDURE — 71020 HB CHEST X-RAY 2VW FRONTAL&LATL: CPT

## 2017-01-01 PROCEDURE — 85730 THROMBOPLASTIN TIME PARTIAL: CPT | Performed by: INTERNAL MEDICINE

## 2017-01-01 PROCEDURE — 93306 TTE W/DOPPLER COMPLETE: CPT

## 2017-01-01 PROCEDURE — 80048 BASIC METABOLIC PNL TOTAL CA: CPT | Performed by: PHYSICIAN ASSISTANT

## 2017-01-01 PROCEDURE — 97163 PT EVAL HIGH COMPLEX 45 MIN: CPT

## 2017-01-01 PROCEDURE — 84100 ASSAY OF PHOSPHORUS: CPT | Performed by: EMERGENCY MEDICINE

## 2017-01-01 PROCEDURE — 0JH63XZ INSERTION OF TUNNELED VASCULAR ACCESS DEVICE INTO CHEST SUBCUTANEOUS TISSUE AND FASCIA, PERCUTANEOUS APPROACH: ICD-10-PCS | Performed by: SURGERY

## 2017-01-01 PROCEDURE — 85610 PROTHROMBIN TIME: CPT | Performed by: STUDENT IN AN ORGANIZED HEALTH CARE EDUCATION/TRAINING PROGRAM

## 2017-01-01 PROCEDURE — 71020 HB CHEST X-RAY 2VW FRONTAL&LATL: CPT | Performed by: EMERGENCY MEDICINE

## 2017-01-01 PROCEDURE — 71010 HB CHEST X-RAY 1 VIEW FRONTAL (PORTABLE): CPT

## 2017-01-01 PROCEDURE — 85018 HEMOGLOBIN: CPT | Performed by: EMERGENCY MEDICINE

## 2017-01-01 PROCEDURE — 85018 HEMOGLOBIN: CPT | Performed by: NURSE PRACTITIONER

## 2017-01-01 PROCEDURE — 93458 L HRT ARTERY/VENTRICLE ANGIO: CPT | Performed by: INTERNAL MEDICINE

## 2017-01-01 PROCEDURE — G8979 MOBILITY GOAL STATUS: HCPCS

## 2017-01-01 PROCEDURE — 99284 EMERGENCY DEPT VISIT MOD MDM: CPT

## 2017-01-01 PROCEDURE — 86850 RBC ANTIBODY SCREEN: CPT | Performed by: EMERGENCY MEDICINE

## 2017-01-01 PROCEDURE — 94760 N-INVAS EAR/PLS OXIMETRY 1: CPT

## 2017-01-01 PROCEDURE — 86901 BLOOD TYPING SEROLOGIC RH(D): CPT | Performed by: STUDENT IN AN ORGANIZED HEALTH CARE EDUCATION/TRAINING PROGRAM

## 2017-01-01 PROCEDURE — 80048 BASIC METABOLIC PNL TOTAL CA: CPT | Performed by: EMERGENCY MEDICINE

## 2017-01-01 PROCEDURE — 86920 COMPATIBILITY TEST SPIN: CPT

## 2017-01-01 PROCEDURE — 82330 ASSAY OF CALCIUM: CPT

## 2017-01-01 PROCEDURE — 36600 WITHDRAWAL OF ARTERIAL BLOOD: CPT

## 2017-01-01 PROCEDURE — 85027 COMPLETE CBC AUTOMATED: CPT | Performed by: PHYSICIAN ASSISTANT

## 2017-01-01 PROCEDURE — 83735 ASSAY OF MAGNESIUM: CPT | Performed by: EMERGENCY MEDICINE

## 2017-01-01 PROCEDURE — 99152 MOD SED SAME PHYS/QHP 5/>YRS: CPT | Performed by: INTERNAL MEDICINE

## 2017-01-01 PROCEDURE — 36569 INSJ PICC 5 YR+ W/O IMAGING: CPT

## 2017-01-01 PROCEDURE — 80048 BASIC METABOLIC PNL TOTAL CA: CPT | Performed by: SURGERY

## 2017-01-01 PROCEDURE — 82803 BLOOD GASES ANY COMBINATION: CPT

## 2017-01-01 PROCEDURE — 92950 HEART/LUNG RESUSCITATION CPR: CPT

## 2017-01-01 PROCEDURE — 85610 PROTHROMBIN TIME: CPT | Performed by: SURGERY

## 2017-01-01 PROCEDURE — 80048 BASIC METABOLIC PNL TOTAL CA: CPT | Performed by: NURSE PRACTITIONER

## 2017-01-01 PROCEDURE — P9016 RBC LEUKOCYTES REDUCED: HCPCS

## 2017-01-01 PROCEDURE — 83735 ASSAY OF MAGNESIUM: CPT | Performed by: PHYSICIAN ASSISTANT

## 2017-01-01 PROCEDURE — C1769 GUIDE WIRE: HCPCS | Performed by: INTERNAL MEDICINE

## 2017-01-01 PROCEDURE — 96374 THER/PROPH/DIAG INJ IV PUSH: CPT

## 2017-01-01 PROCEDURE — 0WCG0ZZ EXTIRPATION OF MATTER FROM PERITONEAL CAVITY, OPEN APPROACH: ICD-10-PCS | Performed by: SURGERY

## 2017-01-01 PROCEDURE — C1894 INTRO/SHEATH, NON-LASER: HCPCS | Performed by: INTERNAL MEDICINE

## 2017-01-01 PROCEDURE — 84484 ASSAY OF TROPONIN QUANT: CPT

## 2017-01-01 PROCEDURE — 84100 ASSAY OF PHOSPHORUS: CPT | Performed by: PHYSICIAN ASSISTANT

## 2017-01-01 PROCEDURE — 78452 HT MUSCLE IMAGE SPECT MULT: CPT

## 2017-01-01 PROCEDURE — 82947 ASSAY GLUCOSE BLOOD QUANT: CPT

## 2017-01-01 PROCEDURE — 93005 ELECTROCARDIOGRAM TRACING: CPT

## 2017-01-01 PROCEDURE — 99153 MOD SED SAME PHYS/QHP EA: CPT | Performed by: INTERNAL MEDICINE

## 2017-01-01 PROCEDURE — 96375 TX/PRO/DX INJ NEW DRUG ADDON: CPT

## 2017-01-01 PROCEDURE — 93017 CV STRESS TEST TRACING ONLY: CPT

## 2017-01-01 PROCEDURE — 85027 COMPLETE CBC AUTOMATED: CPT | Performed by: EMERGENCY MEDICINE

## 2017-01-01 PROCEDURE — 93225 XTRNL ECG REC<48 HRS REC: CPT

## 2017-01-01 PROCEDURE — 94002 VENT MGMT INPAT INIT DAY: CPT

## 2017-01-01 PROCEDURE — 93971 EXTREMITY STUDY: CPT

## 2017-01-01 PROCEDURE — 85025 COMPLETE CBC W/AUTO DIFF WBC: CPT | Performed by: NURSE PRACTITIONER

## 2017-01-01 PROCEDURE — 83690 ASSAY OF LIPASE: CPT | Performed by: EMERGENCY MEDICINE

## 2017-01-01 PROCEDURE — 0BH17EZ INSERTION OF ENDOTRACHEAL AIRWAY INTO TRACHEA, VIA NATURAL OR ARTIFICIAL OPENING: ICD-10-PCS | Performed by: ANESTHESIOLOGY

## 2017-01-01 PROCEDURE — 85730 THROMBOPLASTIN TIME PARTIAL: CPT | Performed by: EMERGENCY MEDICINE

## 2017-01-01 PROCEDURE — 99285 EMERGENCY DEPT VISIT HI MDM: CPT

## 2017-01-01 PROCEDURE — C9132 KCENTRA, PER I.U.: HCPCS | Performed by: EMERGENCY MEDICINE

## 2017-01-01 PROCEDURE — G8988 SELF CARE GOAL STATUS: HCPCS

## 2017-01-01 PROCEDURE — 93005 ELECTROCARDIOGRAM TRACING: CPT | Performed by: INTERNAL MEDICINE

## 2017-01-01 PROCEDURE — 97116 GAIT TRAINING THERAPY: CPT

## 2017-01-01 PROCEDURE — 83605 ASSAY OF LACTIC ACID: CPT | Performed by: EMERGENCY MEDICINE

## 2017-01-01 PROCEDURE — G8978 MOBILITY CURRENT STATUS: HCPCS

## 2017-01-01 PROCEDURE — 82330 ASSAY OF CALCIUM: CPT | Performed by: EMERGENCY MEDICINE

## 2017-01-01 PROCEDURE — P9021 RED BLOOD CELLS UNIT: HCPCS

## 2017-01-01 PROCEDURE — A9502 TC99M TETROFOSMIN: HCPCS

## 2017-01-01 PROCEDURE — 82805 BLOOD GASES W/O2 SATURATION: CPT | Performed by: EMERGENCY MEDICINE

## 2017-01-01 PROCEDURE — 85025 COMPLETE CBC W/AUTO DIFF WBC: CPT | Performed by: PHYSICIAN ASSISTANT

## 2017-01-01 PROCEDURE — 30233N1 TRANSFUSION OF NONAUTOLOGOUS RED BLOOD CELLS INTO PERIPHERAL VEIN, PERCUTANEOUS APPROACH: ICD-10-PCS | Performed by: SURGERY

## 2017-01-01 PROCEDURE — 97166 OT EVAL MOD COMPLEX 45 MIN: CPT

## 2017-01-01 PROCEDURE — 85027 COMPLETE CBC AUTOMATED: CPT | Performed by: INTERNAL MEDICINE

## 2017-01-01 PROCEDURE — 83880 ASSAY OF NATRIURETIC PEPTIDE: CPT | Performed by: INTERNAL MEDICINE

## 2017-01-01 PROCEDURE — 84295 ASSAY OF SERUM SODIUM: CPT

## 2017-01-01 PROCEDURE — 97530 THERAPEUTIC ACTIVITIES: CPT

## 2017-01-01 PROCEDURE — 93005 ELECTROCARDIOGRAM TRACING: CPT | Performed by: STUDENT IN AN ORGANIZED HEALTH CARE EDUCATION/TRAINING PROGRAM

## 2017-01-01 PROCEDURE — 83735 ASSAY OF MAGNESIUM: CPT | Performed by: INTERNAL MEDICINE

## 2017-01-01 PROCEDURE — 85610 PROTHROMBIN TIME: CPT | Performed by: INTERNAL MEDICINE

## 2017-01-01 PROCEDURE — 93226 XTRNL ECG REC<48 HR SCAN A/R: CPT

## 2017-01-01 PROCEDURE — 02HV33Z INSERTION OF INFUSION DEVICE INTO SUPERIOR VENA CAVA, PERCUTANEOUS APPROACH: ICD-10-PCS | Performed by: SURGERY

## 2017-01-01 PROCEDURE — 84132 ASSAY OF SERUM POTASSIUM: CPT | Performed by: EMERGENCY MEDICINE

## 2017-01-01 PROCEDURE — 0Y380ZZ CONTROL BLEEDING IN LEFT FEMORAL REGION, OPEN APPROACH: ICD-10-PCS | Performed by: SURGERY

## 2017-01-01 PROCEDURE — 97110 THERAPEUTIC EXERCISES: CPT

## 2017-01-01 PROCEDURE — 5A1935Z RESPIRATORY VENTILATION, LESS THAN 24 CONSECUTIVE HOURS: ICD-10-PCS | Performed by: ANESTHESIOLOGY

## 2017-01-01 PROCEDURE — 85730 THROMBOPLASTIN TIME PARTIAL: CPT | Performed by: NURSE PRACTITIONER

## 2017-01-01 PROCEDURE — 80061 LIPID PANEL: CPT | Performed by: INTERNAL MEDICINE

## 2017-01-01 PROCEDURE — 86850 RBC ANTIBODY SCREEN: CPT | Performed by: STUDENT IN AN ORGANIZED HEALTH CARE EDUCATION/TRAINING PROGRAM

## 2017-01-01 PROCEDURE — 05HN33Z INSERTION OF INFUSION DEVICE INTO LEFT INTERNAL JUGULAR VEIN, PERCUTANEOUS APPROACH: ICD-10-PCS | Performed by: EMERGENCY MEDICINE

## 2017-01-01 PROCEDURE — 96361 HYDRATE IV INFUSION ADD-ON: CPT

## 2017-01-01 PROCEDURE — 74174 CTA ABD&PLVS W/CONTRAST: CPT

## 2017-01-01 PROCEDURE — 85610 PROTHROMBIN TIME: CPT | Performed by: PHYSICIAN ASSISTANT

## 2017-01-01 PROCEDURE — 85027 COMPLETE CBC AUTOMATED: CPT | Performed by: SURGERY

## 2017-01-01 PROCEDURE — 86900 BLOOD TYPING SEROLOGIC ABO: CPT | Performed by: STUDENT IN AN ORGANIZED HEALTH CARE EDUCATION/TRAINING PROGRAM

## 2017-01-01 PROCEDURE — 36415 COLL VENOUS BLD VENIPUNCTURE: CPT

## 2017-01-01 PROCEDURE — 74176 CT ABD & PELVIS W/O CONTRAST: CPT

## 2017-01-01 PROCEDURE — 86900 BLOOD TYPING SEROLOGIC ABO: CPT | Performed by: EMERGENCY MEDICINE

## 2017-01-01 PROCEDURE — 85014 HEMATOCRIT: CPT | Performed by: EMERGENCY MEDICINE

## 2017-01-01 RX ORDER — LEVALBUTEROL 1.25 MG/.5ML
1.25 SOLUTION, CONCENTRATE RESPIRATORY (INHALATION) EVERY 4 HOURS PRN
Status: DISCONTINUED | OUTPATIENT
Start: 2017-01-01 | End: 2017-01-01 | Stop reason: HOSPADM

## 2017-01-01 RX ORDER — FUROSEMIDE 20 MG/1
60 TABLET ORAL 2 TIMES DAILY
COMMUNITY

## 2017-01-01 RX ORDER — ONDANSETRON 2 MG/ML
4 INJECTION INTRAMUSCULAR; INTRAVENOUS EVERY 6 HOURS PRN
Status: DISCONTINUED | OUTPATIENT
Start: 2017-01-01 | End: 2017-01-01

## 2017-01-01 RX ORDER — ONDANSETRON 2 MG/ML
4 INJECTION INTRAMUSCULAR; INTRAVENOUS EVERY 6 HOURS PRN
Status: DISCONTINUED | OUTPATIENT
Start: 2017-01-01 | End: 2017-01-01 | Stop reason: HOSPADM

## 2017-01-01 RX ORDER — ASPIRIN 81 MG/1
81 TABLET, CHEWABLE ORAL DAILY
Status: DISCONTINUED | OUTPATIENT
Start: 2017-01-01 | End: 2017-01-01 | Stop reason: HOSPADM

## 2017-01-01 RX ORDER — CAPTOPRIL 25 MG/1
25 TABLET ORAL 3 TIMES DAILY
Status: DISCONTINUED | OUTPATIENT
Start: 2017-01-01 | End: 2017-01-01 | Stop reason: HOSPADM

## 2017-01-01 RX ORDER — OXYCODONE HYDROCHLORIDE 5 MG/1
2.5 TABLET ORAL EVERY 4 HOURS PRN
Status: DISCONTINUED | OUTPATIENT
Start: 2017-01-01 | End: 2017-01-01

## 2017-01-01 RX ORDER — ONDANSETRON 2 MG/ML
4 INJECTION INTRAMUSCULAR; INTRAVENOUS ONCE AS NEEDED
Status: DISCONTINUED | OUTPATIENT
Start: 2017-01-01 | End: 2017-01-01 | Stop reason: HOSPADM

## 2017-01-01 RX ORDER — LORATADINE 10 MG/1
10 TABLET ORAL DAILY
Status: DISCONTINUED | OUTPATIENT
Start: 2017-01-01 | End: 2017-01-01 | Stop reason: HOSPADM

## 2017-01-01 RX ORDER — FUROSEMIDE 10 MG/ML
60 INJECTION INTRAMUSCULAR; INTRAVENOUS
Status: DISCONTINUED | OUTPATIENT
Start: 2017-01-01 | End: 2017-01-01 | Stop reason: HOSPADM

## 2017-01-01 RX ORDER — CAPTOPRIL 25 MG/1
25 TABLET ORAL 3 TIMES DAILY
COMMUNITY

## 2017-01-01 RX ORDER — ATORVASTATIN CALCIUM 40 MG/1
40 TABLET, FILM COATED ORAL DAILY
COMMUNITY

## 2017-01-01 RX ORDER — FENTANYL CITRATE 50 UG/ML
25 INJECTION, SOLUTION INTRAMUSCULAR; INTRAVENOUS EVERY 2 HOUR PRN
Status: DISCONTINUED | OUTPATIENT
Start: 2017-01-01 | End: 2017-01-01

## 2017-01-01 RX ORDER — ATORVASTATIN CALCIUM 40 MG/1
40 TABLET, FILM COATED ORAL DAILY
Status: DISCONTINUED | OUTPATIENT
Start: 2017-01-01 | End: 2017-01-01 | Stop reason: HOSPADM

## 2017-01-01 RX ORDER — DOCUSATE SODIUM 100 MG/1
100 CAPSULE, LIQUID FILLED ORAL 2 TIMES DAILY
Status: DISCONTINUED | OUTPATIENT
Start: 2017-01-01 | End: 2017-01-01 | Stop reason: HOSPADM

## 2017-01-01 RX ORDER — ASPIRIN 81 MG/1
81 TABLET, CHEWABLE ORAL DAILY
Qty: 30 TABLET | Refills: 0 | Status: CANCELLED
Start: 2017-01-01

## 2017-01-01 RX ORDER — CITALOPRAM 20 MG/1
20 TABLET ORAL DAILY
Status: DISCONTINUED | OUTPATIENT
Start: 2017-01-01 | End: 2017-01-01 | Stop reason: HOSPADM

## 2017-01-01 RX ORDER — BUPIVACAINE HYDROCHLORIDE AND EPINEPHRINE 2.5; 5 MG/ML; UG/ML
INJECTION, SOLUTION INFILTRATION; PERINEURAL AS NEEDED
Status: DISCONTINUED | OUTPATIENT
Start: 2017-01-01 | End: 2017-01-01 | Stop reason: HOSPADM

## 2017-01-01 RX ORDER — ISOSORBIDE MONONITRATE 30 MG/1
30 TABLET, EXTENDED RELEASE ORAL DAILY
Status: DISCONTINUED | OUTPATIENT
Start: 2017-01-01 | End: 2017-01-01 | Stop reason: HOSPADM

## 2017-01-01 RX ORDER — FENTANYL CITRATE 50 UG/ML
50 INJECTION, SOLUTION INTRAMUSCULAR; INTRAVENOUS EVERY 2 HOUR PRN
Status: DISCONTINUED | OUTPATIENT
Start: 2017-01-01 | End: 2017-01-01

## 2017-01-01 RX ORDER — CITALOPRAM 20 MG/1
20 TABLET ORAL DAILY
COMMUNITY

## 2017-01-01 RX ORDER — ASPIRIN 81 MG/1
324 TABLET, CHEWABLE ORAL ONCE
Status: COMPLETED | OUTPATIENT
Start: 2017-01-01 | End: 2017-01-01

## 2017-01-01 RX ORDER — CHLORHEXIDINE GLUCONATE 0.12 MG/ML
15 RINSE ORAL EVERY 12 HOURS SCHEDULED
Status: DISCONTINUED | OUTPATIENT
Start: 2017-01-01 | End: 2017-01-01 | Stop reason: HOSPADM

## 2017-01-01 RX ORDER — WARFARIN SODIUM 5 MG/1
5 TABLET ORAL
Status: ON HOLD | COMMUNITY
End: 2017-01-01

## 2017-01-01 RX ORDER — WARFARIN SODIUM 2.5 MG/1
2.5 TABLET ORAL
Status: DISCONTINUED | OUTPATIENT
Start: 2017-01-01 | End: 2017-01-01

## 2017-01-01 RX ORDER — SODIUM CHLORIDE 9 MG/ML
100 INJECTION, SOLUTION INTRAVENOUS CONTINUOUS
Status: DISPENSED | OUTPATIENT
Start: 2017-01-01 | End: 2017-01-01

## 2017-01-01 RX ORDER — AMLODIPINE BESYLATE 2.5 MG/1
2.5 TABLET ORAL DAILY
Status: DISCONTINUED | OUTPATIENT
Start: 2017-01-01 | End: 2017-01-01 | Stop reason: HOSPADM

## 2017-01-01 RX ORDER — GLYCOPYRROLATE 0.2 MG/ML
0.1 INJECTION INTRAMUSCULAR; INTRAVENOUS EVERY 4 HOURS PRN
Status: DISCONTINUED | OUTPATIENT
Start: 2017-01-01 | End: 2017-01-01 | Stop reason: HOSPADM

## 2017-01-01 RX ORDER — LORAZEPAM 2 MG/ML
0.5 INJECTION INTRAMUSCULAR
Status: DISCONTINUED | OUTPATIENT
Start: 2017-01-01 | End: 2017-01-01 | Stop reason: HOSPADM

## 2017-01-01 RX ORDER — LORATADINE 10 MG/1
10 TABLET ORAL DAILY
COMMUNITY

## 2017-01-01 RX ORDER — POTASSIUM CHLORIDE 750 MG/1
10 TABLET, EXTENDED RELEASE ORAL DAILY
Status: DISCONTINUED | OUTPATIENT
Start: 2017-01-01 | End: 2017-01-01

## 2017-01-01 RX ORDER — CITALOPRAM 10 MG/1
20 TABLET ORAL DAILY
Status: DISCONTINUED | OUTPATIENT
Start: 2017-01-01 | End: 2017-01-01

## 2017-01-01 RX ORDER — AMIODARONE HYDROCHLORIDE 200 MG/1
200 TABLET ORAL 2 TIMES DAILY
Status: DISCONTINUED | OUTPATIENT
Start: 2017-01-01 | End: 2017-01-01

## 2017-01-01 RX ORDER — FENTANYL CITRATE 50 UG/ML
50 INJECTION, SOLUTION INTRAMUSCULAR; INTRAVENOUS ONCE
Status: COMPLETED | OUTPATIENT
Start: 2017-01-01 | End: 2017-01-01

## 2017-01-01 RX ORDER — SODIUM CHLORIDE 9 MG/ML
125 INJECTION, SOLUTION INTRAVENOUS CONTINUOUS
Status: CANCELLED | OUTPATIENT
Start: 2017-01-01

## 2017-01-01 RX ORDER — MAGNESIUM HYDROXIDE/ALUMINUM HYDROXICE/SIMETHICONE 120; 1200; 1200 MG/30ML; MG/30ML; MG/30ML
30 SUSPENSION ORAL ONCE
Status: COMPLETED | OUTPATIENT
Start: 2017-01-01 | End: 2017-01-01

## 2017-01-01 RX ORDER — POTASSIUM CHLORIDE 20MEQ/15ML
10 LIQUID (ML) ORAL DAILY
Status: DISCONTINUED | OUTPATIENT
Start: 2017-01-01 | End: 2017-01-01

## 2017-01-01 RX ORDER — FLUTICASONE FUROATE AND VILANTEROL 100; 25 UG/1; UG/1
1 POWDER RESPIRATORY (INHALATION)
COMMUNITY

## 2017-01-01 RX ORDER — METOPROLOL TARTRATE 5 MG/5ML
5 INJECTION INTRAVENOUS ONCE
Status: COMPLETED | OUTPATIENT
Start: 2017-01-01 | End: 2017-01-01

## 2017-01-01 RX ORDER — FENTANYL CITRATE/PF 50 MCG/ML
12.5 SYRINGE (ML) INJECTION
Status: DISCONTINUED | OUTPATIENT
Start: 2017-01-01 | End: 2017-01-01 | Stop reason: HOSPADM

## 2017-01-01 RX ORDER — FUROSEMIDE 10 MG/ML
60 INJECTION INTRAMUSCULAR; INTRAVENOUS ONCE
Status: COMPLETED | OUTPATIENT
Start: 2017-01-01 | End: 2017-01-01

## 2017-01-01 RX ORDER — AMLODIPINE BESYLATE 2.5 MG/1
2.5 TABLET ORAL DAILY
COMMUNITY

## 2017-01-01 RX ORDER — SENNOSIDES 8.6 MG
1 TABLET ORAL
Status: DISCONTINUED | OUTPATIENT
Start: 2017-01-01 | End: 2017-01-01

## 2017-01-01 RX ORDER — ONDANSETRON 2 MG/ML
4 INJECTION INTRAMUSCULAR; INTRAVENOUS ONCE
Status: COMPLETED | OUTPATIENT
Start: 2017-01-01 | End: 2017-01-01

## 2017-01-01 RX ORDER — AMIODARONE HYDROCHLORIDE 200 MG/1
200 TABLET ORAL 2 TIMES DAILY
Status: ON HOLD | COMMUNITY
End: 2017-01-01

## 2017-01-01 RX ORDER — HEPARIN SODIUM 10000 [USP'U]/100ML
3-20 INJECTION, SOLUTION INTRAVENOUS
Status: DISCONTINUED | OUTPATIENT
Start: 2017-01-01 | End: 2017-01-01

## 2017-01-01 RX ORDER — ASPIRIN 81 MG/1
81 TABLET, CHEWABLE ORAL DAILY
Status: DISCONTINUED | OUTPATIENT
Start: 2017-01-01 | End: 2017-01-01

## 2017-01-01 RX ORDER — ISOSORBIDE MONONITRATE 30 MG/1
30 TABLET, EXTENDED RELEASE ORAL DAILY
Qty: 30 TABLET | Refills: 0 | Status: ON HOLD | OUTPATIENT
Start: 2017-01-01 | End: 2017-01-01

## 2017-01-01 RX ORDER — IRON POLYSACCHARIDE COMPLEX 150 MG
150 CAPSULE ORAL DAILY
COMMUNITY
End: 2017-01-01 | Stop reason: HOSPADM

## 2017-01-01 RX ORDER — ACETAMINOPHEN 325 MG/1
650 TABLET ORAL EVERY 6 HOURS SCHEDULED
Status: DISCONTINUED | OUTPATIENT
Start: 2017-01-01 | End: 2017-01-01

## 2017-01-01 RX ORDER — POTASSIUM CHLORIDE 750 MG/1
10 CAPSULE, EXTENDED RELEASE ORAL
COMMUNITY

## 2017-01-01 RX ORDER — MIDAZOLAM HYDROCHLORIDE 1 MG/ML
INJECTION INTRAMUSCULAR; INTRAVENOUS CODE/TRAUMA/SEDATION MEDICATION
Status: COMPLETED | OUTPATIENT
Start: 2017-01-01 | End: 2017-01-01

## 2017-01-01 RX ORDER — AMLODIPINE BESYLATE 2.5 MG/1
2.5 TABLET ORAL DAILY
Status: DISCONTINUED | OUTPATIENT
Start: 2017-01-01 | End: 2017-01-01

## 2017-01-01 RX ORDER — FENTANYL CITRATE 50 UG/ML
INJECTION, SOLUTION INTRAMUSCULAR; INTRAVENOUS CODE/TRAUMA/SEDATION MEDICATION
Status: COMPLETED | OUTPATIENT
Start: 2017-01-01 | End: 2017-01-01

## 2017-01-01 RX ORDER — SODIUM CHLORIDE 9 MG/ML
100 INJECTION, SOLUTION INTRAVENOUS CONTINUOUS
Status: DISCONTINUED | OUTPATIENT
Start: 2017-01-01 | End: 2017-01-01

## 2017-01-01 RX ORDER — AMIODARONE HYDROCHLORIDE 200 MG/1
200 TABLET ORAL DAILY
Qty: 30 TABLET | Refills: 0 | Status: ON HOLD
Start: 2017-01-01 | End: 2017-01-01

## 2017-01-01 RX ORDER — AMIODARONE HYDROCHLORIDE 100 MG/1
100 TABLET ORAL DAILY
COMMUNITY
End: 2017-01-01 | Stop reason: HOSPADM

## 2017-01-01 RX ORDER — WARFARIN SODIUM 2.5 MG/1
2.5 TABLET ORAL
Status: COMPLETED | OUTPATIENT
Start: 2017-01-01 | End: 2017-01-01

## 2017-01-01 RX ORDER — FUROSEMIDE 10 MG/ML
20 INJECTION INTRAMUSCULAR; INTRAVENOUS ONCE
Status: DISCONTINUED | OUTPATIENT
Start: 2017-01-01 | End: 2017-01-01

## 2017-01-01 RX ORDER — AMIODARONE HYDROCHLORIDE 100 MG/1
100 TABLET ORAL EVERY OTHER DAY
Qty: 30 TABLET | Refills: 0 | Status: CANCELLED | OUTPATIENT
Start: 2017-01-01

## 2017-01-01 RX ORDER — WARFARIN SODIUM 5 MG/1
5 TABLET ORAL
Status: DISCONTINUED | OUTPATIENT
Start: 2017-01-01 | End: 2017-01-01

## 2017-01-01 RX ORDER — OXYCODONE HYDROCHLORIDE 5 MG/1
5 TABLET ORAL EVERY 4 HOURS PRN
Status: DISCONTINUED | OUTPATIENT
Start: 2017-01-01 | End: 2017-01-01

## 2017-01-01 RX ORDER — LIDOCAINE HYDROCHLORIDE 10 MG/ML
INJECTION, SOLUTION EPIDURAL; INFILTRATION; INTRACAUDAL; PERINEURAL
Status: DISCONTINUED
Start: 2017-01-01 | End: 2017-01-01 | Stop reason: HOSPADM

## 2017-01-01 RX ORDER — SODIUM CHLORIDE, SODIUM GLUCONATE, SODIUM ACETATE, POTASSIUM CHLORIDE, MAGNESIUM CHLORIDE, SODIUM PHOSPHATE, DIBASIC, AND POTASSIUM PHOSPHATE .53; .5; .37; .037; .03; .012; .00082 G/100ML; G/100ML; G/100ML; G/100ML; G/100ML; G/100ML; G/100ML
50 INJECTION, SOLUTION INTRAVENOUS CONTINUOUS
Status: DISCONTINUED | OUTPATIENT
Start: 2017-01-01 | End: 2017-01-01

## 2017-01-01 RX ORDER — IRON POLYSACCHARIDE COMPLEX 150 MG
150 CAPSULE ORAL DAILY
COMMUNITY
End: 2017-01-01

## 2017-01-01 RX ORDER — DOCUSATE SODIUM 100 MG/1
100 CAPSULE, LIQUID FILLED ORAL 2 TIMES DAILY
Status: DISCONTINUED | OUTPATIENT
Start: 2017-01-01 | End: 2017-01-01

## 2017-01-01 RX ORDER — HEPARIN SODIUM 1000 [USP'U]/ML
INJECTION, SOLUTION INTRAVENOUS; SUBCUTANEOUS AS NEEDED
Status: DISCONTINUED | OUTPATIENT
Start: 2017-01-01 | End: 2017-01-01 | Stop reason: SURG

## 2017-01-01 RX ORDER — AMIODARONE HYDROCHLORIDE 100 MG/1
100 TABLET ORAL EVERY OTHER DAY
Refills: 0
Start: 2017-01-01

## 2017-01-01 RX ORDER — AMIODARONE HYDROCHLORIDE 200 MG/1
100 TABLET ORAL
Status: DISCONTINUED | OUTPATIENT
Start: 2017-01-01 | End: 2017-01-01

## 2017-01-01 RX ORDER — SODIUM CHLORIDE 9 MG/ML
75 INJECTION, SOLUTION INTRAVENOUS CONTINUOUS
Status: DISCONTINUED | OUTPATIENT
Start: 2017-01-01 | End: 2017-01-01

## 2017-01-01 RX ORDER — SODIUM CHLORIDE FOR INHALATION 0.9 %
3 VIAL, NEBULIZER (ML) INHALATION EVERY 6 HOURS PRN
Status: DISCONTINUED | OUTPATIENT
Start: 2017-01-01 | End: 2017-01-01

## 2017-01-01 RX ORDER — GLYCOPYRROLATE 0.2 MG/ML
0.1 INJECTION INTRAMUSCULAR; INTRAVENOUS ONCE
Status: COMPLETED | OUTPATIENT
Start: 2017-01-01 | End: 2017-01-01

## 2017-01-01 RX ORDER — PANTOPRAZOLE SODIUM 40 MG/1
40 INJECTION, POWDER, FOR SOLUTION INTRAVENOUS
Status: DISCONTINUED | OUTPATIENT
Start: 2017-01-01 | End: 2017-01-01

## 2017-01-01 RX ORDER — LEVALBUTEROL INHALATION SOLUTION 1.25 MG/3ML
1 SOLUTION RESPIRATORY (INHALATION) EVERY 4 HOURS PRN
COMMUNITY

## 2017-01-01 RX ORDER — PROTAMINE SULFATE 10 MG/ML
INJECTION, SOLUTION INTRAVENOUS AS NEEDED
Status: DISCONTINUED | OUTPATIENT
Start: 2017-01-01 | End: 2017-01-01 | Stop reason: SURG

## 2017-01-01 RX ORDER — ACETAMINOPHEN 325 MG/1
650 TABLET ORAL EVERY 6 HOURS PRN
Status: DISCONTINUED | OUTPATIENT
Start: 2017-01-01 | End: 2017-01-01 | Stop reason: HOSPADM

## 2017-01-01 RX ORDER — MEPERIDINE HYDROCHLORIDE 25 MG/ML
12.5 INJECTION INTRAMUSCULAR; INTRAVENOUS; SUBCUTANEOUS
Status: DISCONTINUED | OUTPATIENT
Start: 2017-01-01 | End: 2017-01-01 | Stop reason: HOSPADM

## 2017-01-01 RX ORDER — ATORVASTATIN CALCIUM 40 MG/1
40 TABLET, FILM COATED ORAL DAILY
Status: DISCONTINUED | OUTPATIENT
Start: 2017-01-01 | End: 2017-01-01

## 2017-01-01 RX ORDER — AMIODARONE HYDROCHLORIDE 100 MG/1
100 TABLET ORAL DAILY
Status: DISCONTINUED | OUTPATIENT
Start: 2017-01-01 | End: 2017-01-01

## 2017-01-01 RX ORDER — PROPOFOL 10 MG/ML
INJECTION, EMULSION INTRAVENOUS CONTINUOUS PRN
Status: DISCONTINUED | OUTPATIENT
Start: 2017-01-01 | End: 2017-01-01 | Stop reason: SURG

## 2017-01-01 RX ORDER — GUAIFENESIN 600 MG
1200 TABLET, EXTENDED RELEASE 12 HR ORAL DAILY PRN
COMMUNITY

## 2017-01-01 RX ORDER — CEFAZOLIN SODIUM 1 G/3ML
INJECTION, POWDER, FOR SOLUTION INTRAMUSCULAR; INTRAVENOUS AS NEEDED
Status: DISCONTINUED | OUTPATIENT
Start: 2017-01-01 | End: 2017-01-01 | Stop reason: SURG

## 2017-01-01 RX ORDER — LEVALBUTEROL 1.25 MG/.5ML
1.25 SOLUTION, CONCENTRATE RESPIRATORY (INHALATION) EVERY 6 HOURS PRN
Status: DISCONTINUED | OUTPATIENT
Start: 2017-01-01 | End: 2017-01-01

## 2017-01-01 RX ORDER — ASPIRIN 81 MG/1
81 TABLET, CHEWABLE ORAL DAILY
Refills: 0
Start: 2017-01-01

## 2017-01-01 RX ORDER — SODIUM CHLORIDE 9 MG/ML
125 INJECTION, SOLUTION INTRAVENOUS CONTINUOUS
Status: DISCONTINUED | OUTPATIENT
Start: 2017-01-01 | End: 2017-01-01

## 2017-01-01 RX ORDER — AMIODARONE HYDROCHLORIDE 200 MG/1
200 TABLET ORAL
Status: DISCONTINUED | OUTPATIENT
Start: 2017-01-01 | End: 2017-01-01 | Stop reason: HOSPADM

## 2017-01-01 RX ORDER — POTASSIUM CHLORIDE 750 MG/1
10 TABLET, EXTENDED RELEASE ORAL 2 TIMES DAILY
Status: DISCONTINUED | OUTPATIENT
Start: 2017-01-01 | End: 2017-01-01

## 2017-01-01 RX ORDER — SODIUM CHLORIDE 9 MG/ML
50 INJECTION, SOLUTION INTRAVENOUS CONTINUOUS
Status: DISCONTINUED | OUTPATIENT
Start: 2017-01-01 | End: 2017-01-01

## 2017-01-01 RX ORDER — GUAIFENESIN 600 MG
1200 TABLET, EXTENDED RELEASE 12 HR ORAL DAILY PRN
Status: DISCONTINUED | OUTPATIENT
Start: 2017-01-01 | End: 2017-01-01

## 2017-01-01 RX ORDER — FUROSEMIDE 10 MG/ML
60 INJECTION INTRAMUSCULAR; INTRAVENOUS
Status: DISCONTINUED | OUTPATIENT
Start: 2017-01-01 | End: 2017-01-01

## 2017-01-01 RX ORDER — AMIODARONE HYDROCHLORIDE 200 MG/1
100 TABLET ORAL
Status: DISCONTINUED | OUTPATIENT
Start: 2017-01-01 | End: 2017-01-01 | Stop reason: HOSPADM

## 2017-01-01 RX ORDER — LEVALBUTEROL 1.25 MG/.5ML
1.25 SOLUTION, CONCENTRATE RESPIRATORY (INHALATION) EVERY 4 HOURS PRN
Status: DISCONTINUED | OUTPATIENT
Start: 2017-01-01 | End: 2017-01-01

## 2017-01-01 RX ORDER — WARFARIN SODIUM 5 MG/1
5 TABLET ORAL
Qty: 30 TABLET | Refills: 0
Start: 2017-01-01 | End: 2017-01-01

## 2017-01-01 RX ORDER — GUAIFENESIN 600 MG
1200 TABLET, EXTENDED RELEASE 12 HR ORAL DAILY PRN
Status: DISCONTINUED | OUTPATIENT
Start: 2017-01-01 | End: 2017-01-01 | Stop reason: HOSPADM

## 2017-01-01 RX ORDER — LIDOCAINE HYDROCHLORIDE 10 MG/ML
INJECTION, SOLUTION INFILTRATION; PERINEURAL CODE/TRAUMA/SEDATION MEDICATION
Status: COMPLETED | OUTPATIENT
Start: 2017-01-01 | End: 2017-01-01

## 2017-01-01 RX ORDER — MAGNESIUM HYDROXIDE/ALUMINUM HYDROXICE/SIMETHICONE 120; 1200; 1200 MG/30ML; MG/30ML; MG/30ML
30 SUSPENSION ORAL EVERY 6 HOURS PRN
Status: DISCONTINUED | OUTPATIENT
Start: 2017-01-01 | End: 2017-01-01 | Stop reason: HOSPADM

## 2017-01-01 RX ORDER — MIDAZOLAM HYDROCHLORIDE 1 MG/ML
INJECTION INTRAMUSCULAR; INTRAVENOUS AS NEEDED
Status: DISCONTINUED | OUTPATIENT
Start: 2017-01-01 | End: 2017-01-01 | Stop reason: SURG

## 2017-01-01 RX ORDER — ASPIRIN 81 MG/1
324 TABLET, CHEWABLE ORAL ONCE
Status: CANCELLED | OUTPATIENT
Start: 2017-01-01 | End: 2017-01-01

## 2017-01-01 RX ADMIN — SENNOSIDES 8.6 MG: 8.6 TABLET, FILM COATED ORAL at 21:58

## 2017-01-01 RX ADMIN — FENTANYL CITRATE 50 MCG: 50 INJECTION INTRAMUSCULAR; INTRAVENOUS at 08:29

## 2017-01-01 RX ADMIN — FENTANYL CITRATE 25 MCG: 50 INJECTION, SOLUTION INTRAMUSCULAR; INTRAVENOUS at 08:10

## 2017-01-01 RX ADMIN — ATORVASTATIN CALCIUM 40 MG: 40 TABLET, FILM COATED ORAL at 09:58

## 2017-01-01 RX ADMIN — WARFARIN SODIUM 5 MG: 5 TABLET ORAL at 17:54

## 2017-01-01 RX ADMIN — ATORVASTATIN CALCIUM 40 MG: 40 TABLET, FILM COATED ORAL at 07:49

## 2017-01-01 RX ADMIN — FENTANYL CITRATE 25 MCG: 50 INJECTION, SOLUTION INTRAMUSCULAR; INTRAVENOUS at 08:22

## 2017-01-01 RX ADMIN — DOCUSATE SODIUM 100 MG: 100 CAPSULE, LIQUID FILLED ORAL at 17:36

## 2017-01-01 RX ADMIN — METOPROLOL TARTRATE 12.5 MG: 25 TABLET ORAL at 08:49

## 2017-01-01 RX ADMIN — ACETAMINOPHEN 650 MG: 325 TABLET, FILM COATED ORAL at 17:44

## 2017-01-01 RX ADMIN — REGADENOSON 0.4 MG: 0.08 INJECTION, SOLUTION INTRAVENOUS at 14:24

## 2017-01-01 RX ADMIN — AMIODARONE HYDROCHLORIDE 100 MG: 200 TABLET ORAL at 09:56

## 2017-01-01 RX ADMIN — AMIODARONE HYDROCHLORIDE 200 MG: 200 TABLET ORAL at 20:51

## 2017-01-01 RX ADMIN — LEVALBUTEROL HYDROCHLORIDE 1.25 MG: 1.25 SOLUTION, CONCENTRATE RESPIRATORY (INHALATION) at 00:13

## 2017-01-01 RX ADMIN — CITALOPRAM HYDROBROMIDE 20 MG: 20 TABLET ORAL at 08:49

## 2017-01-01 RX ADMIN — METOPROLOL TARTRATE 12.5 MG: 25 TABLET ORAL at 08:54

## 2017-01-01 RX ADMIN — FLUTICASONE PROPIONATE AND SALMETEROL 1 PUFF: 50; 250 POWDER RESPIRATORY (INHALATION) at 20:49

## 2017-01-01 RX ADMIN — FUROSEMIDE 60 MG: 20 TABLET ORAL at 17:54

## 2017-01-01 RX ADMIN — METOPROLOL TARTRATE 12.5 MG: 25 TABLET ORAL at 07:49

## 2017-01-01 RX ADMIN — ATORVASTATIN CALCIUM 40 MG: 40 TABLET, FILM COATED ORAL at 08:49

## 2017-01-01 RX ADMIN — ACETAMINOPHEN 650 MG: 325 TABLET, FILM COATED ORAL at 11:00

## 2017-01-01 RX ADMIN — SODIUM CHLORIDE 75 ML/HR: 0.9 INJECTION, SOLUTION INTRAVENOUS at 14:15

## 2017-01-01 RX ADMIN — FENTANYL CITRATE 50 MCG: 50 INJECTION INTRAMUSCULAR; INTRAVENOUS at 05:52

## 2017-01-01 RX ADMIN — HEPARIN SODIUM 3000 UNITS: 1000 INJECTION INTRAVENOUS; SUBCUTANEOUS at 13:17

## 2017-01-01 RX ADMIN — ACETAMINOPHEN 650 MG: 325 TABLET, FILM COATED ORAL at 05:32

## 2017-01-01 RX ADMIN — CITALOPRAM HYDROBROMIDE 20 MG: 20 TABLET ORAL at 08:44

## 2017-01-01 RX ADMIN — MIDAZOLAM 0.5 MG: 1 INJECTION INTRAMUSCULAR; INTRAVENOUS at 07:57

## 2017-01-01 RX ADMIN — Medication 0.5 MG/HR: at 06:13

## 2017-01-01 RX ADMIN — AMIODARONE HYDROCHLORIDE 150 MG: 50 INJECTION, SOLUTION INTRAVENOUS at 13:33

## 2017-01-01 RX ADMIN — ACETAMINOPHEN 650 MG: 325 TABLET, FILM COATED ORAL at 12:08

## 2017-01-01 RX ADMIN — ONDANSETRON 4 MG: 2 INJECTION INTRAMUSCULAR; INTRAVENOUS at 07:04

## 2017-01-01 RX ADMIN — FUROSEMIDE 60 MG: 10 INJECTION, SOLUTION INTRAMUSCULAR; INTRAVENOUS at 17:45

## 2017-01-01 RX ADMIN — SENNOSIDES 8.6 MG: 8.6 TABLET, FILM COATED ORAL at 21:26

## 2017-01-01 RX ADMIN — ACETAMINOPHEN 650 MG: 325 TABLET, FILM COATED ORAL at 05:00

## 2017-01-01 RX ADMIN — LIDOCAINE HYDROCHLORIDE 15 ML: 20 SOLUTION ORAL; TOPICAL at 02:34

## 2017-01-01 RX ADMIN — IPRATROPIUM BROMIDE 2 PUFF: 17 AEROSOL, METERED RESPIRATORY (INHALATION) at 08:42

## 2017-01-01 RX ADMIN — METOPROLOL TARTRATE 25 MG: 25 TABLET ORAL at 08:44

## 2017-01-01 RX ADMIN — ATORVASTATIN CALCIUM 40 MG: 40 TABLET, FILM COATED ORAL at 17:40

## 2017-01-01 RX ADMIN — ACETAMINOPHEN 650 MG: 325 TABLET, FILM COATED ORAL at 00:32

## 2017-01-01 RX ADMIN — AMLODIPINE BESYLATE 2.5 MG: 2.5 TABLET ORAL at 09:56

## 2017-01-01 RX ADMIN — FUROSEMIDE 60 MG: 20 TABLET ORAL at 08:47

## 2017-01-01 RX ADMIN — SODIUM CHLORIDE: 0.9 INJECTION, SOLUTION INTRAVENOUS at 12:34

## 2017-01-01 RX ADMIN — FUROSEMIDE 60 MG: 10 INJECTION, SOLUTION INTRAMUSCULAR; INTRAVENOUS at 09:01

## 2017-01-01 RX ADMIN — CITALOPRAM HYDROBROMIDE 20 MG: 20 TABLET ORAL at 09:58

## 2017-01-01 RX ADMIN — MIDAZOLAM 0.5 MG: 1 INJECTION INTRAMUSCULAR; INTRAVENOUS at 08:53

## 2017-01-01 RX ADMIN — FUROSEMIDE 60 MG: 10 INJECTION, SOLUTION INTRAMUSCULAR; INTRAVENOUS at 08:46

## 2017-01-01 RX ADMIN — FENTANYL CITRATE 25 MCG: 50 INJECTION, SOLUTION INTRAMUSCULAR; INTRAVENOUS at 13:04

## 2017-01-01 RX ADMIN — IPRATROPIUM BROMIDE 2 PUFF: 17 AEROSOL, METERED RESPIRATORY (INHALATION) at 20:48

## 2017-01-01 RX ADMIN — FLUTICASONE PROPIONATE AND SALMETEROL 1 PUFF: 50; 250 POWDER RESPIRATORY (INHALATION) at 08:43

## 2017-01-01 RX ADMIN — ACETAMINOPHEN 650 MG: 325 TABLET, FILM COATED ORAL at 17:04

## 2017-01-01 RX ADMIN — FUROSEMIDE 60 MG: 20 TABLET ORAL at 21:41

## 2017-01-01 RX ADMIN — HEPARIN SODIUM AND DEXTROSE 10 UNITS/KG/HR: 10000; 5 INJECTION INTRAVENOUS at 22:45

## 2017-01-01 RX ADMIN — DOCUSATE SODIUM 100 MG: 100 CAPSULE, LIQUID FILLED ORAL at 17:44

## 2017-01-01 RX ADMIN — ACETAMINOPHEN 650 MG: 325 TABLET, FILM COATED ORAL at 17:54

## 2017-01-01 RX ADMIN — FENTANYL CITRATE 12.5 MCG: 50 INJECTION INTRAMUSCULAR; INTRAVENOUS at 15:26

## 2017-01-01 RX ADMIN — LIDOCAINE HYDROCHLORIDE 10 ML: 10 INJECTION, SOLUTION INFILTRATION; PERINEURAL at 08:01

## 2017-01-01 RX ADMIN — ALUMINUM HYDROXIDE, MAGNESIUM HYDROXIDE, AND SIMETHICONE 30 ML: 200; 200; 20 SUSPENSION ORAL at 02:20

## 2017-01-01 RX ADMIN — MIDAZOLAM 0.5 MG: 1 INJECTION INTRAMUSCULAR; INTRAVENOUS at 08:10

## 2017-01-01 RX ADMIN — FENTANYL CITRATE 25 MCG: 50 INJECTION, SOLUTION INTRAMUSCULAR; INTRAVENOUS at 12:45

## 2017-01-01 RX ADMIN — DOCUSATE SODIUM 100 MG: 100 CAPSULE, LIQUID FILLED ORAL at 20:51

## 2017-01-01 RX ADMIN — HEPARIN SODIUM AND DEXTROSE 12 UNITS/KG/HR: 10000; 5 INJECTION INTRAVENOUS at 15:02

## 2017-01-01 RX ADMIN — ACETAMINOPHEN 650 MG: 325 TABLET, FILM COATED ORAL at 00:08

## 2017-01-01 RX ADMIN — METOPROLOL TARTRATE 25 MG: 25 TABLET ORAL at 20:51

## 2017-01-01 RX ADMIN — ISODIUM CHLORIDE 3 ML: 0.03 SOLUTION RESPIRATORY (INHALATION) at 00:08

## 2017-01-01 RX ADMIN — DOCUSATE SODIUM 100 MG: 100 CAPSULE, LIQUID FILLED ORAL at 08:54

## 2017-01-01 RX ADMIN — ISODIUM CHLORIDE 3 ML: 0.03 SOLUTION RESPIRATORY (INHALATION) at 01:17

## 2017-01-01 RX ADMIN — METOPROLOL TARTRATE 12.5 MG: 25 TABLET ORAL at 08:47

## 2017-01-01 RX ADMIN — DOCUSATE SODIUM 100 MG: 100 CAPSULE, LIQUID FILLED ORAL at 08:44

## 2017-01-01 RX ADMIN — ACETAMINOPHEN 650 MG: 325 TABLET, FILM COATED ORAL at 00:48

## 2017-01-01 RX ADMIN — FENTANYL CITRATE 25 MCG: 50 INJECTION, SOLUTION INTRAMUSCULAR; INTRAVENOUS at 12:32

## 2017-01-01 RX ADMIN — METOPROLOL TARTRATE 12.5 MG: 25 TABLET ORAL at 09:57

## 2017-01-01 RX ADMIN — FUROSEMIDE 60 MG: 20 TABLET ORAL at 09:57

## 2017-01-01 RX ADMIN — CITALOPRAM HYDROBROMIDE 20 MG: 20 TABLET ORAL at 09:01

## 2017-01-01 RX ADMIN — ACETAMINOPHEN 650 MG: 325 TABLET, FILM COATED ORAL at 06:44

## 2017-01-01 RX ADMIN — LIDOCAINE HYDROCHLORIDE 10 ML: 10 INJECTION, SOLUTION INFILTRATION; PERINEURAL at 08:22

## 2017-01-01 RX ADMIN — SODIUM CHLORIDE 500 ML: 0.9 INJECTION, SOLUTION INTRAVENOUS at 08:47

## 2017-01-01 RX ADMIN — ASPIRIN 81 MG 81 MG: 81 TABLET ORAL at 08:49

## 2017-01-01 RX ADMIN — ASPIRIN 81 MG 81 MG: 81 TABLET ORAL at 08:46

## 2017-01-01 RX ADMIN — LEVALBUTEROL HYDROCHLORIDE 1.25 MG: 1.25 SOLUTION, CONCENTRATE RESPIRATORY (INHALATION) at 01:35

## 2017-01-01 RX ADMIN — AMLODIPINE BESYLATE 2.5 MG: 2.5 TABLET ORAL at 08:44

## 2017-01-01 RX ADMIN — AMLODIPINE BESYLATE 2.5 MG: 2.5 TABLET ORAL at 07:53

## 2017-01-01 RX ADMIN — ACETAMINOPHEN 650 MG: 325 TABLET, FILM COATED ORAL at 05:11

## 2017-01-01 RX ADMIN — AMIODARONE HYDROCHLORIDE 1 MG/MIN: 50 INJECTION, SOLUTION INTRAVENOUS at 13:43

## 2017-01-01 RX ADMIN — ACETAMINOPHEN 650 MG: 325 TABLET, FILM COATED ORAL at 17:36

## 2017-01-01 RX ADMIN — ACETAMINOPHEN 650 MG: 325 TABLET, FILM COATED ORAL at 11:52

## 2017-01-01 RX ADMIN — CEFAZOLIN SODIUM 1000 MG: 1 SOLUTION INTRAVENOUS at 08:49

## 2017-01-01 RX ADMIN — LEVALBUTEROL HYDROCHLORIDE 1.25 MG: 1.25 SOLUTION, CONCENTRATE RESPIRATORY (INHALATION) at 00:08

## 2017-01-01 RX ADMIN — DOCUSATE SODIUM 100 MG: 100 CAPSULE, LIQUID FILLED ORAL at 18:43

## 2017-01-01 RX ADMIN — LEVALBUTEROL HYDROCHLORIDE 1.25 MG: 1.25 SOLUTION, CONCENTRATE RESPIRATORY (INHALATION) at 01:17

## 2017-01-01 RX ADMIN — POTASSIUM CHLORIDE 10 MEQ: 20 SOLUTION ORAL at 08:47

## 2017-01-01 RX ADMIN — DOCUSATE SODIUM 100 MG: 100 CAPSULE, LIQUID FILLED ORAL at 09:57

## 2017-01-01 RX ADMIN — FUROSEMIDE 60 MG: 20 TABLET ORAL at 10:55

## 2017-01-01 RX ADMIN — ACETAMINOPHEN 650 MG: 325 TABLET, FILM COATED ORAL at 17:48

## 2017-01-01 RX ADMIN — ATORVASTATIN CALCIUM 40 MG: 40 TABLET, FILM COATED ORAL at 08:44

## 2017-01-01 RX ADMIN — ATORVASTATIN CALCIUM 40 MG: 40 TABLET, FILM COATED ORAL at 08:54

## 2017-01-01 RX ADMIN — ATORVASTATIN CALCIUM 40 MG: 40 TABLET, FILM COATED ORAL at 09:01

## 2017-01-01 RX ADMIN — WARFARIN SODIUM 5 MG: 5 TABLET ORAL at 17:52

## 2017-01-01 RX ADMIN — FUROSEMIDE 60 MG: 10 INJECTION, SOLUTION INTRAMUSCULAR; INTRAVENOUS at 17:36

## 2017-01-01 RX ADMIN — ACETAMINOPHEN 650 MG: 325 TABLET, FILM COATED ORAL at 00:58

## 2017-01-01 RX ADMIN — ACETAMINOPHEN 650 MG: 325 TABLET, FILM COATED ORAL at 12:28

## 2017-01-01 RX ADMIN — AMIODARONE HYDROCHLORIDE 0.5 MG/MIN: 50 INJECTION, SOLUTION INTRAVENOUS at 15:01

## 2017-01-01 RX ADMIN — ACETAMINOPHEN 650 MG: 325 TABLET, FILM COATED ORAL at 18:42

## 2017-01-01 RX ADMIN — IOHEXOL 120 ML: 350 INJECTION, SOLUTION INTRAVENOUS at 09:01

## 2017-01-01 RX ADMIN — POTASSIUM CHLORIDE 10 MEQ: 750 TABLET, EXTENDED RELEASE ORAL at 09:57

## 2017-01-01 RX ADMIN — HEPARIN SODIUM AND DEXTROSE 12 UNITS/KG/HR: 10000; 5 INJECTION INTRAVENOUS at 14:03

## 2017-01-01 RX ADMIN — HEPARIN SODIUM AND DEXTROSE 12 UNITS/KG/HR: 10000; 5 INJECTION INTRAVENOUS at 18:46

## 2017-01-01 RX ADMIN — FUROSEMIDE 60 MG: 10 INJECTION, SOLUTION INTRAMUSCULAR; INTRAVENOUS at 06:04

## 2017-01-01 RX ADMIN — CITALOPRAM HYDROBROMIDE 20 MG: 20 TABLET ORAL at 07:52

## 2017-01-01 RX ADMIN — SODIUM CHLORIDE 500 ML: 0.9 INJECTION, SOLUTION INTRAVENOUS at 07:23

## 2017-01-01 RX ADMIN — WARFARIN SODIUM 5 MG: 5 TABLET ORAL at 17:36

## 2017-01-01 RX ADMIN — CAPTOPRIL 25 MG: 25 TABLET ORAL at 20:52

## 2017-01-01 RX ADMIN — ASPIRIN 81 MG 324 MG: 81 TABLET ORAL at 07:20

## 2017-01-01 RX ADMIN — SODIUM CHLORIDE, SODIUM GLUCONATE, SODIUM ACETATE, POTASSIUM CHLORIDE, MAGNESIUM CHLORIDE, SODIUM PHOSPHATE, DIBASIC, AND POTASSIUM PHOSPHATE 50 ML/HR: .53; .5; .37; .037; .03; .012; .00082 INJECTION, SOLUTION INTRAVENOUS at 06:25

## 2017-01-01 RX ADMIN — GLYCOPYRROLATE 0.1 MG: 0.2 INJECTION, SOLUTION INTRAMUSCULAR; INTRAVENOUS at 06:12

## 2017-01-01 RX ADMIN — DOCUSATE SODIUM 100 MG: 100 CAPSULE, LIQUID FILLED ORAL at 17:48

## 2017-01-01 RX ADMIN — METOPROLOL TARTRATE 12.5 MG: 25 TABLET ORAL at 09:01

## 2017-01-01 RX ADMIN — POTASSIUM CHLORIDE 10 MEQ: 750 TABLET, EXTENDED RELEASE ORAL at 09:01

## 2017-01-01 RX ADMIN — FENTANYL CITRATE 25 MCG: 50 INJECTION, SOLUTION INTRAMUSCULAR; INTRAVENOUS at 08:52

## 2017-01-01 RX ADMIN — HEPARIN SODIUM AND DEXTROSE 12 UNITS/KG/HR: 10000; 5 INJECTION INTRAVENOUS at 14:46

## 2017-01-01 RX ADMIN — PHYTONADIONE 10 MG: 10 INJECTION, EMULSION INTRAMUSCULAR; INTRAVENOUS; SUBCUTANEOUS at 10:59

## 2017-01-01 RX ADMIN — POTASSIUM CHLORIDE 10 MEQ: 20 SOLUTION ORAL at 10:56

## 2017-01-01 RX ADMIN — MIDAZOLAM HYDROCHLORIDE 1 MG: 1 INJECTION, SOLUTION INTRAMUSCULAR; INTRAVENOUS at 12:16

## 2017-01-01 RX ADMIN — FUROSEMIDE 60 MG: 10 INJECTION, SOLUTION INTRAMUSCULAR; INTRAVENOUS at 16:21

## 2017-01-01 RX ADMIN — WARFARIN SODIUM 2.5 MG: 2.5 TABLET ORAL at 18:42

## 2017-01-01 RX ADMIN — FUROSEMIDE 60 MG: 20 TABLET ORAL at 08:49

## 2017-01-01 RX ADMIN — SODIUM CHLORIDE 125 ML/HR: 0.9 INJECTION, SOLUTION INTRAVENOUS at 07:22

## 2017-01-01 RX ADMIN — FUROSEMIDE 60 MG: 20 TABLET ORAL at 17:04

## 2017-01-01 RX ADMIN — HYDROMORPHONE HYDROCHLORIDE 0.5 MG: 1 INJECTION, SOLUTION INTRAMUSCULAR; INTRAVENOUS; SUBCUTANEOUS at 06:30

## 2017-01-01 RX ADMIN — POTASSIUM CHLORIDE 10 MEQ: 750 TABLET, EXTENDED RELEASE ORAL at 07:52

## 2017-01-01 RX ADMIN — DOCUSATE SODIUM 100 MG: 100 CAPSULE, LIQUID FILLED ORAL at 11:01

## 2017-01-01 RX ADMIN — CEFAZOLIN SODIUM 1000 MG: 1 SOLUTION INTRAVENOUS at 00:33

## 2017-01-01 RX ADMIN — ATORVASTATIN CALCIUM 40 MG: 40 TABLET, FILM COATED ORAL at 08:46

## 2017-01-01 RX ADMIN — ASPIRIN 81 MG 81 MG: 81 TABLET ORAL at 08:54

## 2017-01-01 RX ADMIN — AMIODARONE HYDROCHLORIDE 100 MG: 200 TABLET ORAL at 07:49

## 2017-01-01 RX ADMIN — MIDAZOLAM HYDROCHLORIDE 1 MG: 1 INJECTION, SOLUTION INTRAMUSCULAR; INTRAVENOUS at 12:32

## 2017-01-01 RX ADMIN — CEFAZOLIN 1000 MG: 1 INJECTION, POWDER, FOR SOLUTION INTRAVENOUS at 12:54

## 2017-01-01 RX ADMIN — METOPROLOL TARTRATE 5 MG: 5 INJECTION INTRAVENOUS at 03:29

## 2017-01-01 RX ADMIN — ASPIRIN 81 MG 81 MG: 81 TABLET ORAL at 09:01

## 2017-01-01 RX ADMIN — PROPOFOL 70 MCG/KG/MIN: 10 INJECTION, EMULSION INTRAVENOUS at 12:32

## 2017-01-01 RX ADMIN — CAPTOPRIL 25 MG: 25 TABLET ORAL at 17:40

## 2017-01-01 RX ADMIN — AMIODARONE HYDROCHLORIDE 200 MG: 200 TABLET ORAL at 08:44

## 2017-01-01 RX ADMIN — ASPIRIN 81 MG 81 MG: 81 TABLET ORAL at 09:56

## 2017-01-01 RX ADMIN — ONDANSETRON 4 MG: 2 INJECTION INTRAMUSCULAR; INTRAVENOUS at 11:39

## 2017-01-01 RX ADMIN — ACETAMINOPHEN 650 MG: 325 TABLET, FILM COATED ORAL at 23:41

## 2017-01-01 RX ADMIN — DOCUSATE SODIUM 100 MG: 100 CAPSULE, LIQUID FILLED ORAL at 09:01

## 2017-01-01 RX ADMIN — AMLODIPINE BESYLATE 2.5 MG: 2.5 TABLET ORAL at 12:09

## 2017-01-01 RX ADMIN — CITALOPRAM HYDROBROMIDE 20 MG: 20 TABLET ORAL at 08:54

## 2017-01-01 RX ADMIN — AMLODIPINE BESYLATE 2.5 MG: 2.5 TABLET ORAL at 09:01

## 2017-01-01 RX ADMIN — SENNOSIDES 8.6 MG: 8.6 TABLET, FILM COATED ORAL at 22:00

## 2017-01-01 RX ADMIN — ISODIUM CHLORIDE 3 ML: 0.03 SOLUTION RESPIRATORY (INHALATION) at 00:13

## 2017-01-01 RX ADMIN — ACETAMINOPHEN 650 MG: 325 TABLET, FILM COATED ORAL at 00:17

## 2017-01-01 RX ADMIN — FUROSEMIDE 60 MG: 10 INJECTION, SOLUTION INTRAMUSCULAR; INTRAVENOUS at 01:19

## 2017-01-01 RX ADMIN — POTASSIUM CHLORIDE 10 MEQ: 750 TABLET, EXTENDED RELEASE ORAL at 08:54

## 2017-01-01 RX ADMIN — IPRATROPIUM BROMIDE 2 PUFF: 17 AEROSOL, METERED RESPIRATORY (INHALATION) at 03:20

## 2017-01-01 RX ADMIN — FENTANYL CITRATE 25 MCG: 50 INJECTION, SOLUTION INTRAMUSCULAR; INTRAVENOUS at 07:57

## 2017-01-01 RX ADMIN — PROTHROMBIN, COAGULATION FACTOR VII HUMAN, COAGULATION FACTOR IX HUMAN, COAGULATION FACTOR X HUMAN, PROTEIN C, PROTEIN S HUMAN, AND WATER 1626 UNITS: KIT at 10:50

## 2017-01-01 RX ADMIN — MIDAZOLAM 0.5 MG: 1 INJECTION INTRAMUSCULAR; INTRAVENOUS at 08:22

## 2017-01-01 RX ADMIN — AMIODARONE HYDROCHLORIDE 100 MG: 200 TABLET ORAL at 08:46

## 2017-01-01 RX ADMIN — ACETAMINOPHEN 650 MG: 325 TABLET, FILM COATED ORAL at 18:11

## 2017-01-01 RX ADMIN — LORATADINE 10 MG: 10 TABLET ORAL at 08:44

## 2017-01-01 RX ADMIN — ISOSORBIDE MONONITRATE 30 MG: 30 TABLET, EXTENDED RELEASE ORAL at 08:48

## 2017-01-01 RX ADMIN — ASPIRIN 81 MG 81 MG: 81 TABLET ORAL at 08:00

## 2017-01-01 RX ADMIN — POTASSIUM CHLORIDE 10 MEQ: 750 TABLET, EXTENDED RELEASE ORAL at 17:36

## 2017-01-01 RX ADMIN — DOCUSATE SODIUM 100 MG: 100 CAPSULE, LIQUID FILLED ORAL at 07:52

## 2017-01-01 RX ADMIN — ACETAMINOPHEN 650 MG: 325 TABLET, FILM COATED ORAL at 05:57

## 2017-01-01 RX ADMIN — FUROSEMIDE 60 MG: 10 INJECTION, SOLUTION INTRAMUSCULAR; INTRAVENOUS at 07:52

## 2017-01-01 RX ADMIN — ISODIUM CHLORIDE 3 ML: 0.03 SOLUTION RESPIRATORY (INHALATION) at 01:35

## 2017-01-01 RX ADMIN — AMLODIPINE BESYLATE 2.5 MG: 2.5 TABLET ORAL at 08:54

## 2017-01-01 RX ADMIN — ACETAMINOPHEN 650 MG: 325 TABLET, FILM COATED ORAL at 05:55

## 2017-01-01 RX ADMIN — FUROSEMIDE 60 MG: 20 TABLET ORAL at 18:42

## 2017-01-01 RX ADMIN — CITALOPRAM HYDROBROMIDE 20 MG: 20 TABLET ORAL at 08:46

## 2017-01-01 RX ADMIN — WARFARIN SODIUM 2.5 MG: 2.5 TABLET ORAL at 21:41

## 2017-01-01 RX ADMIN — ACETAMINOPHEN 650 MG: 325 TABLET, FILM COATED ORAL at 12:12

## 2017-01-01 RX ADMIN — SODIUM CHLORIDE 50 ML/HR: 0.9 INJECTION, SOLUTION INTRAVENOUS at 07:16

## 2017-01-01 RX ADMIN — WARFARIN SODIUM 5 MG: 5 TABLET ORAL at 17:44

## 2017-01-01 RX ADMIN — CAPTOPRIL 25 MG: 25 TABLET ORAL at 08:42

## 2017-01-01 RX ADMIN — IODIXANOL 85 ML: 320 INJECTION, SOLUTION INTRAVASCULAR at 11:38

## 2017-01-01 RX ADMIN — SODIUM CHLORIDE 50 ML/HR: 0.9 INJECTION, SOLUTION INTRAVENOUS at 16:13

## 2017-01-01 RX ADMIN — DOCUSATE SODIUM 100 MG: 100 CAPSULE, LIQUID FILLED ORAL at 17:55

## 2017-01-01 RX ADMIN — PROTAMINE SULFATE 25 MG: 10 INJECTION, SOLUTION INTRAVENOUS at 13:32

## 2017-01-01 RX ADMIN — AMLODIPINE BESYLATE 2.5 MG: 2.5 TABLET ORAL at 08:49

## 2017-04-20 PROBLEM — I21.4 NSTEMI (NON-ST ELEVATED MYOCARDIAL INFARCTION) (HCC): Status: ACTIVE | Noted: 2017-01-01

## 2017-04-20 PROBLEM — J44.9 COPD (CHRONIC OBSTRUCTIVE PULMONARY DISEASE) (HCC): Status: ACTIVE | Noted: 2017-01-01

## 2017-04-20 PROBLEM — I49.5 SSS (SICK SINUS SYNDROME) (HCC): Status: ACTIVE | Noted: 2017-01-01

## 2017-04-20 PROBLEM — I48.0 PAF (PAROXYSMAL ATRIAL FIBRILLATION) (HCC): Chronic | Status: ACTIVE | Noted: 2017-01-01

## 2017-04-20 PROBLEM — R07.9 CHEST PAIN: Status: ACTIVE | Noted: 2017-01-01

## 2017-04-20 PROBLEM — I50.30 DIASTOLIC HEART FAILURE (HCC): Chronic | Status: ACTIVE | Noted: 2017-01-01

## 2017-04-20 PROBLEM — N18.30 STAGE 3 CHRONIC KIDNEY DISEASE (HCC): Chronic | Status: ACTIVE | Noted: 2017-01-01

## 2017-04-20 PROBLEM — I10 HYPERTENSION: Chronic | Status: ACTIVE | Noted: 2017-01-01

## 2017-04-21 PROBLEM — J96.21 ACUTE ON CHRONIC RESPIRATORY FAILURE WITH HYPOXIA (HCC): Status: ACTIVE | Noted: 2017-01-01

## 2017-04-21 PROBLEM — I21.A1 NON-ST ELEVATION MYOCARDIAL INFARCTION (NSTEMI), TYPE 2: Status: ACTIVE | Noted: 2017-01-01

## 2017-04-21 PROBLEM — I50.33 ACUTE ON CHRONIC DIASTOLIC HEART FAILURE (HCC): Status: ACTIVE | Noted: 2017-01-01

## 2017-08-18 PROBLEM — I35.0 AORTIC STENOSIS: Chronic | Status: ACTIVE | Noted: 2017-01-01

## 2017-08-25 PROBLEM — D62 ACUTE BLOOD LOSS ANEMIA: Status: ACTIVE | Noted: 2017-01-01

## 2017-08-25 PROBLEM — R58 RETROPERITONEAL HEMORRHAGE: Status: ACTIVE | Noted: 2017-01-01

## 2017-08-25 PROBLEM — K66.1 RETROPERITONEAL HEMATOMA: Status: ACTIVE | Noted: 2017-01-01

## 2017-08-25 PROBLEM — D68.9 COAGULOPATHY (HCC): Status: ACTIVE | Noted: 2017-01-01

## 2017-08-25 NOTE — PROGRESS NOTES
Patient evaluated, 81 y/o with recent left femoral access cardiac cath who comes in with left retroperitoneal hematoma  Creatinine is 1 6  She is getting central line placement by ED team   Will need stat CTA arterial and venous phase  There is risk of worsening renal insufficiency but at this point benefits of CTA outweighs the risk  If there is active bleeding she will need go to the OR

## 2017-08-25 NOTE — ED NOTES
Unable to start a second line with 3 RN attempts   ED resident in room starting central line      Tiffany Clayton [facility]  08/25/17 2911

## 2017-08-25 NOTE — ED NOTES
Patient transported to CT with RN, NATHAN Seo  Patient placed on a portable monitor        Misha Elias  08/25/17 1128

## 2017-08-25 NOTE — ED ATTENDING ATTESTATION
Rafael Sutton DO, saw and evaluated the patient  I have discussed the patient with the resident/non-physician practitioner and agree with the resident's/non-physician practitioner's findings, Plan of Care, and MDM as documented in the resident's/non-physician practitioner's note, except where noted  All available labs and Radiology studies were reviewed  At this point I agree with the current assessment done in the Emergency Department  I have conducted an independent evaluation of this patient a history and physical is as follows:      Patient complains of worsening pain in her left groin, as well as pressure in her right shoulder since yesterday  She had vascular access in bilateral groin for evaluation for TAVR and had difficulty with cessation of bleeding at the percutaneous site due to Coumadin use  Coumadin was stopped for a few days prior to the procedure and started immediately after the procedure  She denies bleeding from any other sites  She has had prior appendectomy but otherwise has her gallbladder and other intra-abdominal organs are intact  The pain is associated with diaphoresis and nausea/vomiting  She has some shortness of breath  It is no worse than usual  Patient does use oxygen continuously at home  She reports a normal bowel movement this morning without change in pain  However, when she sat up  Pain became suddenly and significantly worse  No hematochezia, melena or hematemesis  Decreased appetite since the pain started  No change in pain with voiding  ROS: Denies f/c, CP, SOB, diarrhea or dysuria  12 system ROS o/w negative   PE: Moderate distress, appears uncomfortable, alert; PERRL, EOMI; MMM, no posterior oropharyngeal exudate, edema or erythema; HRR, FRANKLYN with radiating bruits; lungs CTA w/o w/r/r, POx 98% on RA (nl); abdomen s/nd, moderate diffuse TTP with rebound but no guarding, tender to percussion diffusely, diminished BS in all 4 quadrants; moderate LE edema b/l, FROM extremities x4, tenderness in b/l groins at the percutaneous access sites with ecchymoses but no palpable hematoma; skin o/w p/w/d; CNs appear GI/NF, oriented  DDx: Abdominal/groin/right shoulder pain - postoperative complications including intraperitoneal bleeding, retroperitoneal hematoma, arteriovenous fistula, bowel ischemia, acute cholecystitis with peritonitis  A/P: Will check CT a/p, abdominal labs, treat symptoms, reevaluate for further work up and disposition  Will likely require surgical consult and admission  Critical Care Time  The patient presented with a condition in which there was a high probability of imminent or life-threatening deterioration, and critical care services (excluding separately billable procedures) totalled 30-74 minutes

## 2017-08-25 NOTE — ED NOTES
Family made aware patient is heading for a repeat CT we will come get them once she's back in her room       Piper Kelley  08/25/17 9705

## 2017-08-25 NOTE — ED NOTES
Blood blank called, blood is not ready, they will send uncrossed blood       Anderson Scheuermann  08/25/17 8449

## 2017-08-25 NOTE — PROGRESS NOTES
ICU Acceptance Note/Post-IR Evaluation    Pt brought to the ICU after found to have retroperitoneal hematoma with active extrav on CTA s/p cardiac catheterization last week for TAVR work-up  She was taken from the ED to the OR with vascular surgery and is brought to the ICU post-op  Her HGB was 8 9-7 1  She received 2 units of uncrossed trauma blood and HGB up to 8 4 after the first unit  She takes coumadin for afib and her INR was 2 15 on admission  She was given [facility], 10 vit K and has a repeat INR due post-operatively  She had a right IJ cortis placed in the ER for transfusion purposes  Subjective: Pt denies pain  Reports dry mouth and asking for sips of water  She denies CP, SOB,palpitations, paresthesias, or abdominal pain  She offers no complaints  Objective:  Constitutional: lying in bed, appears sleepy but awake and conversing  HEENT: normocephalic, atraumatic, 3 mm BETY, facial symmetry, clear speech  CV: regular rate and rhythm, + murmur, + valve click, +2 DP pulses, no hematoma bilateral groin sites  Pulm: CTA, no wheezes, rhonchi or crackles, unlabored  Abd: soft, nondistended, nontender, active bowel sounds  Musc: moves all extremitites, equal strength, bilateral groin sites soft, left groin site with dressing from IR, CDI  Neuro: A&O, no deficits, alert conversing and appropriate  GCS 15  Skin: no rash or breakdown, no wound    Vital Signs:    A/P  1  Retroperitoneal hematoma with active extrav at the left femoral common iliac artery   vasc surgery to OR for Stent/cut down for source control  2 u PRBC given in the ED   Coagulopathy reversed with [facility] and Vit K  F/U repeat INR   F/U post-op HGB, q 4 hr HGB to trend    2  Coumadin coagulopathy for mechanical valve   Reversed per above  Hold coumadin  Heparin gtt started, no bolus  Monitor HGB, repeat at 20:00     3  Afib   Slow ventricular rate likely secondary to lopressor  Hold rate control and BP meds in the setting of Acute bleeding   Per family, patient was told to keep her heart rate low to avoid cardiac problems related to her severe AS  Telemetry    4  History of Severe AS   Plan for TAVR in the near future  Monitor volume status closely  Goal HR < 80 bpm    5  History of COPD   Cont home nebs/inhalers as needed  Cont home O2 (2L)  No evidence of acute exacerbation  6  Disposition   ICU monitoring  Trend HGB q 4 hours  F/U INR  Goal INR < 1 4

## 2017-08-25 NOTE — ED PROVIDER NOTES
History  Chief Complaint   Patient presents with    Groin Pain     pt brought in by EMS for left groin pain/numbness after a cardiac cath on monday  reports indigestion, is SOB     71-year-old female presents for evaluation of left groin pain as well as pressure-like discomfort of the right shoulder which patient identifies as indigestion  Patient states the symptoms began approximately 2 hours prior to arrival with a nonradiating discomfort in her right shoulder  Patient then sat up at which point she began having severe  Sharp pains up the left groin  Patient had undergone catheterization with Dr August Conklin on 8/21/17 and had been doing well following the catheter up until this point  She states that she was admitted for one day and had some mild pain of the groin, but this resolved quickly  Patient had access to the groins bilaterally for the procedure  Patient followed with Dr Gerry Easley on 8/24/17 for evaluation for possible TAVR and states that she was feeling her usual self at that time  Patient is having shortness of breath, but states that this is at her baseline  Patient is on 2 L of home oxygen chronically  Last bowel movement this mroning           History provided by:  Patient and relative (daughter in law)  Groin Pain   Location:  Left marilin  Quality:  Sharp, nonradiating  Severity:  Severe  Onset quality:  Sudden  Duration:  2 hours  Timing:  Constant  Progression:  Worsening  Chronicity:  New  Context:  After sitting up  Relieved by:  None tried  Worsened by:  Palpation, movement  Ineffective treatments:  Nothing tried  Associated symptoms: chest pain, nausea and shortness of breath (chronic, at baseline)    Associated symptoms: no abdominal pain, no congestion, no cough, no diarrhea, no fever, no headaches, no myalgias, no rhinorrhea, no sore throat and no vomiting    Chest pain:     Quality: pressure      Severity:  Mild    Onset quality:  Sudden    Duration:  2 hours    Timing:  Constant Progression:  Unchanged    Chronicity:  New  Risk factors: Aortic stenosis, recent cath      Prior to Admission Medications   Prescriptions Last Dose Informant Patient Reported? Taking? Umeclidinium Bromide (INCRUSE ELLIPTA IN)   Yes Yes   Sig: Inhale daily   amLODIPine (NORVASC) 2 5 mg tablet   Yes Yes   Sig: Take 2 5 mg by mouth daily   amiodarone 100 mg tablet   No Yes   Sig: Take 1 tablet by mouth every other day   aspirin 81 mg chewable tablet   No Yes   Sig: Chew 1 tablet daily   atorvastatin (LIPITOR) 40 mg tablet   Yes Yes   Sig: Take 40 mg by mouth daily   captopril (CAPOTEN) 25 mg tablet   Yes Yes   Sig: Take 25 mg by mouth 3 (three) times a day   citalopram (CeleXA) 20 mg tablet   Yes Yes   Sig: Take 20 mg by mouth daily   fluticasone furoate-vilanterol (BREO ELLIPTA)   Yes Yes   Sig: Inhale 1 puff   furosemide (LASIX) 20 mg tablet   Yes Yes   Sig: Take 60 mg by mouth 2 (two) times a day   guaiFENesin (MUCINEX) 600 mg 12 hr tablet   Yes Yes   Sig: Take 1,200 mg by mouth daily as needed for cough   levalbuterol (XOPENEX) 1 25 mg/3 mL nebulizer solution   Yes Yes   Sig: Take 1 ampule by nebulization every 4 (four) hours as needed for wheezing   loratadine (CLARITIN) 10 mg tablet   Yes Yes   Sig: Take 10 mg by mouth daily   metoprolol tartrate (LOPRESSOR) 25 mg tablet   Yes Yes   Sig: Take 12 5 mg by mouth daily     potassium chloride (MICRO-K) 10 MEQ CR capsule   Yes Yes   Sig: Take 10 mEq by mouth daily at bedtime   warfarin (COUMADIN) 5 mg tablet   No Yes   Sig: Take 1 tablet by mouth daily for 30 days 5mg everyday except Monday  2 5mg on Monday  Patient taking differently: Take 5 mg by mouth daily 5mg everyday except Monday and friday    2 5mg on Monday and friday       Facility-Administered Medications: None       Past Medical History:   Diagnosis Date    Anemia     Anxiety     Atrial fibrillation     CAD (coronary artery disease)     CHF (congestive heart failure)     COPD (chronic obstructive pulmonary disease)     Depression     Eczema     Hyperlipidemia     Hypertension     Sick sinus syndrome     Vitamin B12 deficiency        Past Surgical History:   Procedure Laterality Date    MITRAL VALVE REPLACEMENT      Mechanical       History reviewed  No pertinent family history  I have reviewed and agree with the history as documented  Social History   Substance Use Topics    Smoking status: Former Smoker    Smokeless tobacco: Not on file    Alcohol use No        Review of Systems   Constitutional: Negative for appetite change, chills and fever  HENT: Negative for congestion, rhinorrhea and sore throat  Respiratory: Positive for shortness of breath (chronic, at baseline)  Negative for cough and chest tightness  Cardiovascular: Positive for chest pain  Negative for palpitations and leg swelling  Gastrointestinal: Positive for nausea  Negative for abdominal pain, constipation, diarrhea and vomiting  Genitourinary: Negative for dysuria, frequency and hematuria  Musculoskeletal: Negative for myalgias, neck pain and neck stiffness  Skin: Positive for wound (from cath)  Negative for pallor  Neurological: Negative for syncope, weakness and headaches  All other systems reviewed and are negative  Physical Exam  ED Triage Vitals   Temperature Pulse Respirations Blood Pressure SpO2   08/25/17 0624 08/25/17 0624 08/25/17 0624 08/25/17 0624 08/25/17 0624   98 °F (36 7 °C) 55 20 141/60 95 %      Temp Source Heart Rate Source Patient Position BP Location FiO2 (%)   08/25/17 0624 08/25/17 0723 08/25/17 0723 08/25/17 0723 --   Oral Monitor Lying Right arm       Pain Score       08/25/17 0700       7           Physical Exam   Constitutional: She is oriented to person, place, and time  She appears well-developed and well-nourished  Non-toxic appearance  No distress  HENT:   Head: Normocephalic and atraumatic     Eyes: Conjunctivae and EOM are normal  Pupils are equal, round, and reactive to light  Neck: Normal range of motion  Neck supple  No tracheal deviation present  No thyromegaly present  Cardiovascular: Regular rhythm and intact distal pulses  Bradycardia present  Murmur heard  Systolic murmur is present with a grade of 3/6   Murmur radiates to carotid  Bruit on auscultation bilateral groins  Palpable hematoma left groin  Minimal ecchymosis at cath sites  No surrounding erythema or active bleeding from sites  Pulmonary/Chest: Effort normal and breath sounds normal  Tachypnea noted  Pursed lip breathing   Abdominal: Soft  Bowel sounds are normal  She exhibits no distension  There is generalized tenderness  There is no rigidity, no rebound, no guarding, no CVA tenderness, no tenderness at McBurney's point and negative Samayoa's sign  Musculoskeletal: She exhibits no edema  Lymphadenopathy:     She has no cervical adenopathy  Neurological: She is alert and oriented to person, place, and time  Decreased sensation lateral left thigh  Skin: Skin is warm and dry  She is not diaphoretic  Psychiatric: She has a normal mood and affect  Her behavior is normal  Judgment and thought content normal    Nursing note and vitals reviewed        ED Medications  Medications   acetaminophen (TYLENOL) tablet 650 mg ( Oral Dose Auto Held 8/28/17 1800)   fentanyl citrate (PF) 100 MCG/2ML 25 mcg (25 mcg Intravenous Given 8/25/17 1304)   sodium chloride 0 9 % infusion (not administered)   fentaNYL (SUBLIMAZE) injection 12 5 mcg (not administered)   ondansetron (ZOFRAN) injection 4 mg (not administered)   meperidine (DEMEROL) 25 mg/mL injection 12 5 mg (not administered)   sodium chloride 0 9 % infusion (not administered)   ondansetron (ZOFRAN) injection 4 mg (4 mg Intravenous Given 8/25/17 0704)   sodium chloride 0 9 % bolus 500 mL (0 mL Intravenous Stopped 8/25/17 0807)   fentanyl citrate (PF) 100 MCG/2ML 50 mcg (50 mcg Intravenous Given 8/25/17 0829)   sodium chloride 0 9 % bolus 500 mL (0 mL Intravenous Stopped 8/25/17 1110)   phytonadione (AQUA-MEPHYTON) 10 mg/mL 10 mg in sodium chloride 0 9 % 50 mL IVPB (0 mg Intravenous Stopped 8/25/17 1147)   prothrombin complex conc human (KCENTRA) 1,626 Units (1,626 Units Intravenous Given 8/25/17 1050)   iodixanol (VISIPAQUE) 320 MG/ML injection 85 mL (85 mL Intravenous Given 8/25/17 1138)   ceFAZolin (ANCEF) 1,000 mg in sodium chloride 0 9 % 1,000 mL irrigation bottle (200 mL Irrigation Given 8/25/17 1359)   heparin (porcine) 2,000 Units, papaverine 60 mg in multi-electrolyte (ISOLYTE-S PH 7 4 equivalent) 500 mL irrigation (500 mL Irrigation Given 8/25/17 1359)       Diagnostic Studies  Labs Reviewed   COMPREHENSIVE METABOLIC PANEL - Abnormal        Result Value Ref Range Status    CO2 33 (*) 21 - 32 mmol/L Final    BUN 33 (*) 5 - 25 mg/dL Final    Creatinine 1 59 (*) 0 60 - 1 30 mg/dL Final    Comment: Standardized to IDMS reference method    ALT 10 (*) 12 - 78 U/L Final    Comment:   Specimen collection should occur prior to Sulfasalazine and/or Sulfapyridine administration due to the potential for falsely depressed results  Total Protein 5 9 (*) 6 4 - 8 2 g/dL Final    Albumin 2 8 (*) 3 5 - 5 0 g/dL Final    Sodium 142  136 - 145 mmol/L Final    Potassium 4 5  3 5 - 5 3 mmol/L Final    Chloride 104  100 - 108 mmol/L Final    Anion Gap 5  4 - 13 mmol/L Final    Glucose 84  65 - 140 mg/dL Final    Comment:   If the patient is fasting, the ADA then defines impaired fasting glucose as > 100 mg/dL and diabetes as > or equal to 123 mg/dL  Specimen collection should occur prior to Sulfasalazine administration due to the potential for falsely depressed results  Specimen collection should occur prior to Sulfapyridine administration due to the potential for falsely elevated results      Calcium 8 7  8 3 - 10 1 mg/dL Final    AST 19  5 - 45 U/L Final    Comment:   Specimen collection should occur prior to Sulfasalazine administration due to the potential for falsely depressed results  Alkaline Phosphatase 104  46 - 116 U/L Final    Total Bilirubin 0 57  0 20 - 1 00 mg/dL Final    eGFR 30  ml/min/1 73sq m Final    Narrative:     National Kidney Disease Education Program recommendations are as follows:  GFR calculation is accurate only with a steady state creatinine  Chronic Kidney disease less than 60 ml/min/1 73 sq  meters  Kidney failure less than 15 ml/min/1 73 sq  meters  CBC AND DIFFERENTIAL - Abnormal     RBC 3 00 (*) 3 81 - 5 12 Million/uL Final    Hemoglobin 8 9 (*) 11 5 - 15 4 g/dL Final    Hematocrit 28 1 (*) 34 8 - 46 1 % Final    RDW 15 3 (*) 11 6 - 15 1 % Final    WBC 6 09  4 31 - 10 16 Thousand/uL Final    MCV 94  82 - 98 fL Final    MCH 29 7  26 8 - 34 3 pg Final    MCHC 31 7  31 4 - 37 4 g/dL Final    MPV 9 6  8 9 - 12 7 fL Final    Platelets 647  290 - 390 Thousands/uL Final    nRBC 0  /100 WBCs Final    Neutrophils Relative 70  43 - 75 % Final    Lymphocytes Relative 14  14 - 44 % Final    Monocytes Relative 9  4 - 12 % Final    Eosinophils Relative 6  0 - 6 % Final    Basophils Relative 1  0 - 1 % Final    Neutrophils Absolute 4 26  1 85 - 7 62 Thousands/µL Final    Lymphocytes Absolute 0 87  0 60 - 4 47 Thousands/µL Final    Monocytes Absolute 0 53  0 17 - 1 22 Thousand/µL Final    Eosinophils Absolute 0 35  0 00 - 0 61 Thousand/µL Final    Basophils Absolute 0 07  0 00 - 0 10 Thousands/µL Final   PROTIME-INR - Abnormal     Protime 22 9 (*) 12 1 - 14 4 seconds Final    INR 2 00 (*) 0 86 - 1 16 Final   APTT - Abnormal     PTT 44 (*) 23 - 35 seconds Final    Narrative:      Therapeutic Heparin Range = 60-90 seconds   HEMOGLOBIN AND HEMATOCRIT, BLOOD - Abnormal     Hemoglobin 7 1 (*) 11 5 - 15 4 g/dL Final    Hematocrit 22 9 (*) 34 8 - 46 1 % Final   BLOOD GAS, ARTERIAL - Abnormal     pCO2, Arterial 47 3 (*) 36 0 - 44 0 mm Hg Final    HCO3, Arterial 29 6 (*) 22 0 - 28 0 mmol/L Final    O2 Content, Arterial 12 3 (*) 16 0 - 23 0 mL/dL Final    pH, Arterial 7 415  7 350 - 7 450 Final    pO2, Arterial 75 3  75 0 - 129 0 mm Hg Final    Base Excess, Arterial 4 5  mmol/L Final    O2 HGB,Arterial  94 1  94 0 - 97 0 % Final    SOURCE Brachial, Right   Final   BASIC METABOLIC PANEL - Abnormal     BUN 33 (*) 5 - 25 mg/dL Final    Creatinine 1 59 (*) 0 60 - 1 30 mg/dL Final    Comment: Standardized to IDMS reference method    Calcium 7 5 (*) 8 3 - 10 1 mg/dL Final    Sodium 142  136 - 145 mmol/L Final    Potassium 4 9  3 5 - 5 3 mmol/L Final    Chloride 107  100 - 108 mmol/L Final    CO2 30  21 - 32 mmol/L Final    Anion Gap 5  4 - 13 mmol/L Final    Glucose 117  65 - 140 mg/dL Final    Comment:   If the patient is fasting, the ADA then defines impaired fasting glucose as > 100 mg/dL and diabetes as > or equal to 123 mg/dL  Specimen collection should occur prior to Sulfasalazine administration due to the potential for falsely depressed results  Specimen collection should occur prior to Sulfapyridine administration due to the potential for falsely elevated results  eGFR 30  ml/min/1 73sq m Final    Narrative:     National Kidney Disease Education Program recommendations are as follows:  GFR calculation is accurate only with a steady state creatinine  Chronic Kidney disease less than 60 ml/min/1 73 sq  meters  Kidney failure less than 15 ml/min/1 73 sq  meters     CBC AND PLATELET - Abnormal     RBC 2 90 (*) 3 81 - 5 12 Million/uL Final    Hemoglobin 8 4 (*) 11 5 - 15 4 g/dL Final    Hematocrit 27 1 (*) 34 8 - 46 1 % Final    MCHC 31 0 (*) 31 4 - 37 4 g/dL Final    WBC 8 16  4 31 - 10 16 Thousand/uL Final    MCV 93  82 - 98 fL Final    MCH 29 0  26 8 - 34 3 pg Final    RDW 14 6  11 6 - 15 1 % Final    Platelets 362  989 - 390 Thousands/uL Final    MPV 9 6  8 9 - 12 7 fL Final   PROTIME-INR - Abnormal     Protime 24 2 (*) 12 1 - 14 4 seconds Final    INR 2 15 (*) 0 86 - 1 16 Final   LACTIC ACID, PLASMA - Normal    LACTIC ACID 1 9  0 5 - 2 0 mmol/L Final Narrative:     Result may be elevated if tourniquet was used during collection  LIPASE - Normal    Lipase 120  73 - 393 u/L Final   LACTIC ACID, PLASMA - Normal    LACTIC ACID 1 0  0 5 - 2 0 mmol/L Final    Narrative:     Result may be elevated if tourniquet was used during collection  POCT TROPONIN - Normal    POC Troponin I 0 06  0 00 - 0 08 ng/ml Final    Specimen Type VENOUS   Final    Narrative:     [employer] i-Stat handheld analyzer 99% cutoff is > 0 08ng/mL in [facility] Emergency Departments    o cTnI 99% cutoff is useful only when applied to patients in the clinical setting of myocardial ischemia  o cTnI 99% cutoff should be interpreted in the context of clinical history, ECG findings and possibly cardiac imaging to establish correct diagnosis  o cTnI 99% cutoff may be suggestive but clearly not indicative of a coronary event without the clinical setting of myocardial ischemia     PROTIME-INR   HEMOGLOBIN AND HEMATOCRIT, BLOOD   TYPE AND SCREEN    ABO Grouping O   Final    Rh Factor Positive   Final    Antibody Screen Negative   Final    Specimen Expiration Date [id number]   Final   PREPARE RBC    Unit Product Code U4909Z21   Final    Unit Number [id number]   Final    Unit ABO O   Final    Unit [facility] POS   Final    Crossmatch Compatible   Final    Unit Dispense Status Crossmatched   Final    Unit Product Code X3232E23   Final    Unit Number [id number]   Final    Unit ABO O   Final    Unit [facility] POS   Final    Crossmatch Compatible   Final    Unit Dispense Status Crossmatched   Final   PREPARE RBC    Unit Product Code U4569P78   Final    Unit Number [id number]   Final    Unit ABO O   Final    Unit [facility] POS   Final    Crossmatch Compatible   Final    Unit Dispense Status Crossmatched   Final    Unit Product Code M8742S37   Final    Unit Number [id number]   Final    Unit ABO O   Final    Unit [facility] POS   Final    Crossmatch Compatible   Final    Unit Dispense Status Crossmatched   Final   PREPARE RBC Unit Product Code W7010F10       Unit Number [id number]       Unit ABO O       Unit [facility] POS       Crossmatch Compatible   Final    Unit Dispense Status Issued       Unit Product Code Z4688G36       Unit Number [id number]       Unit ABO O       Unit RH POS       Crossmatch Compatible   Final    Unit Dispense Status Issued          CTA abdomen pelvis w wo contrast   Final Result      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  Large associated left retroperitoneal hematoma  ##cfslh   I personally discussed this result with Dr Jamie De Leon on 8/25/2017 12:12 PM    ##         Workstation performed: [id number]         XR chest 1 view portable   ED Interpretation   Adequate position of left ij cordis      Final Result      Unremarkable left IJ cordis  Increased interstitial lung markings with small bibasilar densities and bilateral pleural effusions,   No pneumothorax  Workstation performed: [id number]         X-ray chest 2 views   ED Interpretation   Right pleural effusion slightly increased from prior study      Final Result      Small bilateral effusions  Workstation performed: [id number]         CT abdomen pelvis wo contrast   Final Result      Large left retroperitoneal hemorrhage  Persistent renal cortex enhancement consistent with ATN  Cholelithiasis  Small bilateral pleural effusions           Workstation performed: [id number]         IR lower extremity    (Results Pending)       Procedures  ECG 12 Lead Documentation  Date/Time: 8/25/2017 8:21 AM  Performed by: Tanesha Juárez by: Chago Soria     Indications / Diagnosis:  Dyspnea, chest pain  ECG reviewed by me, the ED Provider: yes    Patient location:  ED  Previous ECG:     Previous ECG:  Compared to current    Comparison ECG info:  8/21/17 sinus bradycardia with left bundle branch block and T-wave inversion in II and aVL and V6 with nonspecific ST elevation in V1, V2 and V3    Similarity:  Changes noted (nonspecific st depressions and twave inversions in lateral leads  Does not meet [place] criteria)  Interpretation:     Interpretation: non-specific    Rate:     ECG rate:  54    ECG rate assessment: bradycardic    Rhythm:     Rhythm: sinus rhythm    Ectopy:     Ectopy: none    QRS:     QRS axis:  Normal    QRS intervals: Wide  Conduction:     Conduction: abnormal      Abnormal conduction: complete LBBB    ST segments:     ST segments:  Non-specific    Elevation:  V1 and V2    Depression:  V3, V4, V5 and V6  T waves:     T waves: inverted      Inverted:  II, aVL, V3, V4, V5 and V6    Central Line  Date/Time: 8/25/2017 11:18 AM  Performed by: Isabell Bosch by: Hugo Ochoa     Patient location:  ED  Other Assisting Provider: Yes (comment) Marielena Weston)    Consent:     Consent obtained:  Written    Consent given by:  Patient    Risks discussed:  Arterial puncture, bleeding, incorrect placement, infection, nerve damage and pneumothorax    Alternatives discussed:  No treatment and alternative treatment  Universal protocol:     Procedure explained and questions answered to patient or proxy's satisfaction: yes      Imaging studies available: yes      Required blood products, implants, devices, and special equipment available: yes      Site/side marked: yes      Immediately prior to procedure, a time out was called: yes      Patient identity confirmed:  Verbally with patient and arm band  Pre-procedure details:     Hand hygiene: Hand hygiene performed prior to insertion      Sterile barrier technique: All elements of maximal sterile technique followed      Skin preparation:  2% chlorhexidine    Skin preparation agent: Skin preparation agent completely dried prior to procedure    Indications:     Central line indications: vascular access and treatment therapy    Anesthesia (see MAR for exact dosages):      Anesthesia method:  Local infiltration    Local anesthetic:  Lidocaine 1% w/o epi  Procedure details:     Location:  Left internal jugular    Laterality:  Left    Approach: percutaneous technique used      Patient position:  Reverse Trendelenburg    Catheter type:  Single lumen    Catheter size:  9 Fr    Landmarks identified: yes      Ultrasound guidance: yes      Sterile ultrasound techniques: Sterile gel and sterile probe covers were used      Number of attempts:  3    Successful placement: yes    Post-procedure details:     Post-procedure:  Dressing applied    Assessment:  Blood return through all ports, free fluid flow, no pneumothorax on x-ray and placement verified by x-ray    Patient tolerance of procedure: Tolerated well, no immediate complications          Phone Consults  ED Phone Contact    ED Course  ED Course   [place]'s Documentation   Comment Time   Will start blood for hemorrhage, developing hypotension and pre-op planning  Case d/w [facility] Dr Augustine García  Will see patient  08/25 0847           Identification of Seniors at [address] [place] [address] Most Recent Value   (ISAR) Identification of Seniors at Risk   Before the illness or injury that brought you to the Emergency, did you need someone to help you on a regular basis? 0 Filed at: 08/25/2017 3445   In the last 24 hours, have you needed more help than usual?  1 Filed at: 08/25/2017 4590   Have you been hospitalized for one or more nights during the past 6 months? 1 Filed at: 08/25/2017 5775   In general, do you see well?  0 Filed at: 08/25/2017 8951   In general, do you have serious problems with your memory? 0 Filed at: 08/25/2017 7661   Do you take more than three different medications every day?   1 Filed at: 08/25/2017 2790   ISAR Score  3 Filed at: 08/25/2017 5134                        MDM  Number of Diagnoses or Management Options  Abdominal pain: new and requires workup  Dyspnea: new and requires workup  Retroperitoneal hemorrhage: new and requires workup  Diagnosis management comments: 27-year-old female presents for evaluation of left groin pain and right shoulder discomfort  Patient short of breath but states this is her baseline  She denies abdominal pain, but has diffuse abdominal tenderness without rebound or guarding  Patient also has an audible bruit in the bilateral groins  Hemoglobin trending down since cath  Fentanyl for pain  IV rehydration  While in ED, patient's BP became soft  A second bolus NS given  CT results show retroperitoneal hemorrhage  Type and cross sent  Patient became sleepier  Repeat hemoglobin continuing to trend down  2 units uncrossed blood given for acute blood loss  Discussed case with surgical critical care  Interventional radiology consulted  Unable to establish large bore peripheral access  As patient appears to be loosing a significant amount of blood volume and pressures are tenuous, decision was made to place IJ cordis  2 attempts made for right IJ  Unable to thread  Vessel appears stenotic at level of mid neck  Switched to left IJ which was more open  Successful placement  Patient had CTA of the abdomen and pelvis performed which shows active bleeding  Patient taken to IR for possible embolization  Patient admitted to critical care for further management          Amount and/or Complexity of Data Reviewed  Clinical lab tests: reviewed and ordered  Tests in the radiology section of CPT®: reviewed and ordered  Decide to obtain previous medical records or to obtain history from someone other than the patient: yes  Discuss the patient with other providers: yes  Independent visualization of images, tracings, or specimens: yes    Patient Progress  Patient progress: stable    CritCare Time    Disposition  Final diagnoses:   Retroperitoneal hemorrhage   Abdominal pain   Dyspnea     ED Disposition     ED Disposition Condition Comment    Admit  Case was discussed with Surgical Critical Care and the patient's admission status was agreed to be Admission Status: inpatient status to the service of Dr Mariel Mott   Follow-up Information    None       Current Discharge Medication List      CONTINUE these medications which have NOT CHANGED    Details   amiodarone 100 mg tablet Take 1 tablet by mouth every other day  Refills: 0      amLODIPine (NORVASC) 2 5 mg tablet Take 2 5 mg by mouth daily      aspirin 81 mg chewable tablet Chew 1 tablet daily  Refills: 0      atorvastatin (LIPITOR) 40 mg tablet Take 40 mg by mouth daily      captopril (CAPOTEN) 25 mg tablet Take 25 mg by mouth 3 (three) times a day      citalopram (CeleXA) 20 mg tablet Take 20 mg by mouth daily      fluticasone furoate-vilanterol (BREO ELLIPTA) Inhale 1 puff      furosemide (LASIX) 20 mg tablet Take 60 mg by mouth 2 (two) times a day      guaiFENesin (MUCINEX) 600 mg 12 hr tablet Take 1,200 mg by mouth daily as needed for cough      levalbuterol (XOPENEX) 1 25 mg/3 mL nebulizer solution Take 1 ampule by nebulization every 4 (four) hours as needed for wheezing      loratadine (CLARITIN) 10 mg tablet Take 10 mg by mouth daily      metoprolol tartrate (LOPRESSOR) 25 mg tablet Take 12 5 mg by mouth daily        potassium chloride (MICRO-K) 10 MEQ CR capsule Take 10 mEq by mouth daily at bedtime      Umeclidinium Bromide (INCRUSE ELLIPTA IN) Inhale daily      warfarin (COUMADIN) 5 mg tablet Take 1 tablet by mouth daily for 30 days 5mg everyday except Monday  2 5mg on Monday  Qty: 30 tablet, Refills: 0           No discharge procedures on file  ED Provider  Attending physically available and evaluated [facility] managed the patient along with the ED Attending      Electronically Signed by       Josue Christine MD  Resident  08/25/17 [address], DO  08/25/17 0718

## 2017-08-25 NOTE — CONSULTS
Consult Note - Vascular Surgery   Gaurav Hernandez 80 y o  female MRN: [id number]    Unit/Bed#: ED 29 Encounter: [id number]    Assessment:  Retroperitoneal Hematoma in setting of recent cardiac catheterization 8/21 and anticoagulation w/ coumadin for afib  Acute Blood Loss Anemia  Coumadin Coagulopathy  Severe AS  CAD    Plan:  --Obtain stat CTA A/P to r/o active extravasation   --OR on hold for possible vascular surgical intervention  --Critical care management for resuscitation, IV access, anticoagulation reversal    Consulting Service: Critical Care    Chief Complaint: Abdominal pain and weakness    HPI: Gaurav Hernandez is a 80 y o  female who recently underwent diagnostic cardiac catheterization on 8/21 in pre-operative evaluation for TAVR due to severe symptomatic AS  She had bilateral femoral access  She was doing well at home until this morning at approximately 4 AM at which point she developed acute onset abdominal pain  She has also felt weak  She presented to the emergency department this morning  CT scan of the abdomen and pelvis was performed without contrast due to elevated creatinine at 1 5  She was found to have a large retroperitoneal hematoma and acute blood loss anemia  Her INR was elevated at 2 0 secondary to Coumadin therapy for atrial fibrillation  Her systolic blood pressures were approximately 100  Vascular surgery was consult for evaluation  On discussion with the patient she admits to abdominal pain and is tender to palpation of the abdomen and groin areas  She denied any chest pain or shortness of breath  Review of Systems:  General: positive for  - fatigue and Weakness  negative for - chills, fever or night sweats  Cardiovascular: no chest pain or dyspnea on exertion  Respiratory: no cough, shortness of breath, or wheezing  Gastrointestinal: Positive for abdominal pain  Denies nausea vomiting diarrhea    Genitourinary ROS: no dysuria, trouble voiding, or hematuria  Musculoskeletal ROS: negative  Neurological ROS: no TIA or stroke symptoms  Hematological and Lymphatic ROS: negative  Dermatological ROS: negative  Psychological ROS: negative  Ophthalmic ROS: negative  ENT ROS: negative    Past Medical History:  · Lung mass  · Severe aortic stenosis  Past Medical History:   Diagnosis Date    Anemia     Anxiety     Atrial fibrillation     CAD (coronary artery disease)     CHF (congestive heart failure)     COPD (chronic obstructive pulmonary disease)     Depression     Eczema     Hyperlipidemia     Hypertension     Sick sinus syndrome     Vitamin B12 deficiency        Past Surgical History:  History reviewed  No pertinent surgical history  Social History:  History   Alcohol Use No     History   Drug Use No     History   Smoking Status    Former Smoker   Smokeless Tobacco    Not on file       Family History:  History reviewed  No pertinent family history  Allergies:  No Known Allergies    Medications:  Current Facility-Administered Medications   Medication Dose Route Frequency    phytonadione (AQUA-MEPHYTON) 10 mg/mL 10 mg in sodium chloride 0 9 % 50 mL IVPB  10 mg Intravenous Once    prothrombin complex conc human (KCENTRA) 1,626 Units  1,626 Units Intravenous Once    sodium chloride 0 9 % infusion  100 mL/hr Intravenous Continuous       Vitals:  /50   Pulse 61   Temp 98 °F (36 7 °C) (Oral)   Resp 18   Wt 61 7 kg (136 lb)   SpO2 95%   BMI 24 87 kg/m²     I/Os:  I/O last 24 hours:   In: 350 [Blood:350]  Out: -     Lab Results and Cultures:   Lab Results   Component Value Date    WBC 6 09 08/25/2017    HGB 7 1 (L) 08/25/2017    HCT 22 9 (L) 08/25/2017    MCV 94 08/25/2017     08/25/2017     Lab Results   Component Value Date    GLUCOSE 84 08/25/2017    CALCIUM 8 7 08/25/2017     08/25/2017    K 4 5 08/25/2017    CO2 33 (H) 08/25/2017     08/25/2017    BUN 33 (H) 08/25/2017    CREATININE 1 59 (H) 08/25/2017     Lab Results   Component Value Date    INR 2 00 (H) 2017    INR 2 06 (H) 2017    INR 3 97 (H) 2017    PROTIME 22 9 (H) 2017    PROTIME 23 4 (H) 2017    PROTIME 37 8 (H) 2017       Lipid Panel:   Lab Results   Component Value Date    CHOL 133 2017   ,     Blood Culture: No results found for: BLOODCX,   Urinalysis:   Lab Results   Component Value Date    COLORU Yellow 10/15/2015    CLARITYU Clear 10/15/2015    SPECGRAV 1 015 10/15/2015    PHUR 7 0 10/15/2015    LEUKOCYTESUR Trace (A) 10/15/2015    NITRITE Negative 10/15/2015    PROTEINUA 100 (2+) (A) 10/15/2015    GLUCOSEU Negative 10/15/2015    KETONESU Negative 10/15/2015    BILIRUBINUR Negative 10/15/2015    BLOODU Trace (A) 10/15/2015   ,   Urine Culture: No results found for: URINECX,   Wound Culure: No results found for: WOUNDCULT    Imagin/25 CT A/P-Large left retroperitoneal hemorrhage, persistent renal cortex enhancement consistent with ATN  Cholelithiasis  A small bilateral pleural effusions  Physical Exam:    General appearance: alert, appears stated age, cooperative, fatigued and pale  Skin: Warm and dry no rashes  Pale  Neurologic: Mental status: Alert, oriented, thought content appropriate  Cranial nerves: normal (Cranial nerves II-XII)  Sensory: normal  Motor:grossly normal  Head: Normocephalic, without obvious abnormality, atraumatic  Eyes: conjunctivae/corneas clear  PERRL, EOM's intact  Fundi benign  Throat: lips, mucosa, and tongue normal; teeth and gums normal  Neck: no adenopathy, no carotid bruit, no JVD, supple, symmetrical, trachea midline and thyroid not enlarged, symmetric, no tenderness/mass/nodules  Back: negative  Lungs: clear to auscultation bilaterally  Chest wall: no tenderness  Heart: irregularly irregular rhythm and S1, S2 normal  Abdomen: Positive bowel sounds, soft, tender, nondistended, no rebound or guarding    Extremities: extremities normal, atraumatic, no cyanosis or edema    Wound/Incision:  Bilateral groin puncture sites without hematoma or ecchymosis  No bleeding noted       Pulse exam:    DP: Right: 1+  PT: Left: 1+      Brennon Chow PA-C  8/25/2017

## 2017-08-25 NOTE — PROGRESS NOTES
Pt seen in ED, full H&P to follow  RP hematoma on CT scan  Initial hb 8 9, repeat 7 1  Pt is pale, /50 at time of my exam, awake and mentating appropriately    Blood not ready for crossmatch yet, requested 2u uncrossed PRBC's be transfused, [address] and vitamin K given    Pt will admit to SICU via red surgery/SICU service    Briefly discussed with IR, request vascular eval as hemorrhage likely extending from groins  D/w Tisha from vascular, will come eval patient for any specific intervention she may need    Will come to ICU for ongoing resuscitation, reversal of coagulopathy (secondary to coumadin), acute blood los anemia       25 minutes CC time spent in preliminary assessment

## 2017-08-25 NOTE — PROGRESS NOTES
R5 Vascular Surgery Note    CTA reviewed,, left retroperitoneal hematoma with active extravasation appearing to come from left femoral artery    OR Plan  - Left groin exploration, evacuation of hematoma, control of bleeding vessel and all indicated/related procedures  - Admit to ICU post operatively      Brien Tamayo, PGY5

## 2017-08-25 NOTE — ED NOTES
1st unit uncrossmatched PRBCs started  Unable to scan because of uncrossed  Documented in I/O section  Per verbal order run 1st unit in over an hour   blood  Verified by myself and charge FOUZIA Maloney RN  08/25/17 8260

## 2017-08-26 PROBLEM — R58 RETROPERITONEAL HEMORRHAGE: Status: ACTIVE | Noted: 2017-01-01

## 2017-08-26 PROBLEM — R57.1 HYPOVOLEMIC SHOCK (HCC): Status: ACTIVE | Noted: 2017-01-01

## 2017-08-26 NOTE — PROGRESS NOTES
Interval progress note      Cordis w/ return of air when aspirating from main port  No ptx on CXR  No clinical signs of active bleeding at this time and pIV access obtained  Cordis was subsequently removed to prevent air embolization  Pressure held to site for approximately 30 minutes given anticoagulation  No immediate complications        Abhinav Patel MD

## 2017-08-26 NOTE — OP NOTE
OPERATIVE REPORT  PATIENT NAME: Shree Han    :  1934  MRN: [id number]  Pt Location: [facility] OR ROOM 02    SURGERY DATE: 2017    Surgeon(s) and Role:     * Karen Suero MD - Primary     * Kristi Mccullough MD - Assisting    Preop Diagnosis:  Retroperitoneal hemorrhage [R58]    Post-Op Diagnosis Codes:     * Retroperitoneal hemorrhage [R58]    Procedure(s) (LRB):  Left ilioinguinal nerve block  Left femoral artery exploration  Repair of bleeding left femoral artery  Evacuation of left retroperitoneal hematoma      Specimen(s):  none    Estimated Blood Loss:   Minimal    Drains:  Urethral Catheter Latex 16 Fr  (Active)   Site Assessment Clean;Skin intact 2017  8:00 AM   Collection Container Standard drainage bag 2017  8:00 AM   Securement Method Securing device (Describe) 2017  8:00 AM   Output (mL) 150 mL 2017  8:00 AM   Number of days: 1       Anesthesia Type:   IV Sedation with Anesthesia + Local ilioinguinal nerve block and field block    Operative Indications:  Retroperitoneal hemorrhage [R58]  S/p cardiac catheterization with a left groin pseudoaneurysm with acute bleeding into the left retroperitoneum resulting in hemorrhagic shock  Operative Findings:  Large retroperitoneal hematoma  Active bleeding from the posterior wall of the common femoral artery from arterial puncture site  Densely calcified common femoral artery and proximal SFA  Complications:   None    Procedure and Technique:  The patient was brought emergently to the operating room and placed on the operating table in the supine position  The patient was identified by verbal confirmation and armband identification  Ancef was given for iv antibiotic  Barrera catheter was placed  The patient was prepped and draped  in bilateral groins and the lower abdomen and upper thighs in usual sterile fashion with ioban and a formal timeout was called      Using marcaine we infiltrated a ilioinguinal nerve block 2 cm inferomedial to the ASIS  We also did a field block over incision line using marcaine  A longitudinal incision was made in a skin fold overlying the previously marked left femoral artery in the groin  Dissection was carried through the subcutaneous tissue and fascia to reach the femoral sheath  The inguinal ligament divided for a short segment was retracted cephalad  The femoral sheath was incised to expose the femoral artery  There was a posterolateral pseudoaneurysm communicating into the retroperitoneum  We dissected proximal and distal and all individual side branches were controlled with vessel loops  Dissection was carried out cephalad towards the external iliac artery under the inguinal ligament  However the anterior wall was soft to be clamped  The profunda femoris was dissected and encircled with loops  The SFA was densely calcified  We extended the incision distally trying to find relatively soft SFA but it was all calcified distally as well  So loops were placed around the proximal SFA  We explored the entire Common femoral, distal external iliac artery and proximal SFA and profunda to look for source of bleeding but no other was found except for the inferolateral wall pseudoaneurysm  As we were dissecting the lateral wall of the artery we entered into the pseudoaneurysm and patient started bleeding  Patient was given 3000 units of iv heparin and we clamped the arteries to control bleeding  We then identified the source of bleeding as a small perforation in the posterior wall of the Common femoral artery related to recent access for cardiac cath  This was repaired using 6-0 prolene suture in a figure of 8 fashion  Doppler was brought to the field and insonation of the common, profunda and superficial femoral arteries demonstrated patency  The proximal SFA did have high resistance sounds indicating existing stenosis from calcified plaque    Using a Tapad suction device we evacuated the retroperitoneal hematoma but it was diffuse through the tissue planes so was not possible to evacuate it completely  20 mg of protamine was administered and FloSeal and Fibrillar procoagulant was instilled in the operative field  Closure was then achieved with a running 3-0 Monocryl suture for the femoral sheath and say's  Skin was closed with staples  A Mepilex bandage was applied  At end of case instrument, sponge and needle counts were found to be correct  The patient had a monophasic left PT signal and was able to move her feet and had brisk cap refill   The patient was transferred to recovery in guarded condition       I was present for the entire procedure    Patient Disposition:  Critical Care Unit    SIGNATURE: Otto Sher MD  DATE: August 26, 2017  TIME: 9:48 AM

## 2017-08-26 NOTE — CONSULTS
Rec change diet to 2gm Na as Renal diet will restrict Phos, K+ (both WNL), Na and protein  RD does not have protocol  Pt will be followed by Nutrition Services at [facility]

## 2017-08-26 NOTE — PROGRESS NOTES
Progress Note - Vascular Surgery   Akila Lima 80 y o  female MRN: [id number]  Unit/Bed#: ICU 12 Encounter: [id number]    Assessment and Plan:  Patient Active Problem List   Diagnosis    Chest pain    PAF (paroxysmal atrial fibrillation)    Non-ST elevation myocardial infarction (NSTEMI), type 2    COPD (chronic obstructive pulmonary disease)    SSS (sick sinus syndrome)    Hypertension    Acute on chronic diastolic heart failure    Stage 3 chronic kidney disease    Acute on chronic respiratory failure with hypoxia    Aortic stenosis    Retroperitoneal hematoma    Acute blood loss anemia    Coagulopathy    Retroperitoneal hemorrhage       Assessment:     44-year-old F with an iatrogenic left CFA posterior injury status post left groin exploration, evacuation of hematoma, control of bleeding from posterior femoral artery on 8/25/17     Plan:   Discontinue Barrera   Discontinue A line   Neurovascular checks q 4 hour   Monitor H&H   Regular diet  Ok to resume lasix tomorrow   Continue heparin gtt and re start coumadin   Discontinue [email or web address]  Oxycodone and dilaudid P R N  for Analgesia  Encourage incentive spirometry use  Heparin gtt and Venodynes for DVT prophylaxis   Pt will be transferred to [facility] once medically stable to leave the ICU        Subjective: No acute complaints  Pt denies pain  She is moving her feet b/k     Objective:       Vitals   Vitals:    08/26/17 0500 08/26/17 0600 08/26/17 0700 08/26/17 0722   BP: 125/51 145/65 124/60    Pulse: 56 60 56    Resp: 15 18 14    Temp:       TempSrc:       SpO2: 100% 100% 100% 99%   Weight:         Temp  Min: 97 4 °F (36 3 °C)  Max: 99 7 °F (37 6 °C)  IBW: -92 5 kg   Body mass index is 24 87 kg/m²  I/O   I/O       08/24 0701 - 08/25 0700 08/25 0701 - 08/26 0700 08/26 0701 - 08/27 0700    P  O   240     I V  (mL/kg)  1057 3 (17 1) 421 9 (6 8)    Blood  700     IV Piggyback  230     Total Intake(mL/kg)  2227 3 (36 1) 421 9 (6 8)    Urine (mL/kg/hr)  790 (0 5)     Total Output   790      Net   +1437 3 +421 9                 Intake/Output Summary (Last 24 hours) at 08/26/17 0820  Last data filed at 08/26/17 0715   Gross per 24 hour   Intake          2649 21 ml   Output              790 ml   Net          1859 21 ml       Invasive  Devices  Invasive Devices     Peripheral Intravenous Line            Peripheral IV 08/25/17 Left Forearm 1 day    Peripheral IV 08/25/17 Right Forearm less than 1 day          Line            Arterial Sheath 96548 days    Arterial Sheath 5 Fr  Left Femoral 4 days          Drain            Urethral Catheter Latex 16 Fr  less than 1 day                Active medications  Scheduled Meds:  acetaminophen 650 mg Oral Q6H [facility]   amiodarone 100 mg Oral Every Other Day   aspirin 81 mg Oral Daily   atorvastatin 40 mg Oral Daily   cefazolin 1,000 mg Intravenous Q8H   citalopram 20 mg Oral Daily   metoprolol tartrate 12 5 mg Oral Daily     Continuous Infusions:    heparin (porcine) 3-20 Units/kg/hr (Order-Specific) Last Rate: 12 Units/kg/hr (08/26/17 0559)   sodium chloride 50 mL/hr Last Rate: 50 mL/hr (08/26/17 0716)   sodium chloride 75 mL/hr Last Rate: Stopped (08/25/17 1801)     PRN Meds:     guaiFENesin 1,200 mg Daily PRN   levalbuterol 1 25 mg Q6H PRN   oxyCODONE 2 5 mg Q4H PRN   oxyCODONE 5 mg Q4H PRN   sodium chloride 3 mL Q6H PRN       Physical Exam: /60   Pulse 56   Temp (!) 97 4 °F (36 3 °C) (Oral)   Resp 14   Wt 61 7 kg (136 lb)   SpO2 99%   BMI 24 87 kg/m²     Physical Exam:  GENERAL APPEARANCE: Patient in no acute distress  CV: Regular rate and rhythm; + S1, S2; no murmur/gallops/rubs appreciated  LUNGS: Clear to auscultation; no wheezes/rales/rhonci  ABD: NABS; soft; non-distended; non-tender  Left groin - mepilex dressing in tact - slight amount of sero-sanguinous draiange  [place] are intact   No hematoma, ecchymosis or erythema noted at incision site   EXT: warm and dry   Left Lower - dopp AT, absent PT & DP  Right Lower - dopp AT & PT   SKIN: Warm, dry and well perfused; no rash; no jaundice  Laboratory and Diagnostics:    Results from last 7 days  Lab Units 08/26/17  0417 08/26/17 0042 08/25/17  1851 08/25/17  1433 08/25/17  1042  08/25/17  0631   WBC Thousand/uL 7 09  --   --  11 35* 8 16  --  6 09   HEMOGLOBIN g/dL 8 7* 8 5* 9 6* 10 4* 8 4*  < > 8 9*   HEMATOCRIT % 26 0* 25 3* 29 3* 31 6* 27 1*  < > 28 1*   PLATELETS Thousands/uL 143*  --   --  161 180  --  207   NEUTROS PCT % 71  --   --   --   --   --  70   MONOS PCT % 11  --   --   --   --   --  9   < > = values in this interval not displayed  Results from last 7 days  Lab Units 08/26/17 0432 08/25/17 1851 08/25/17  1433  08/25/17  0631   SODIUM mmol/L 144 140 140  < > 142   POTASSIUM mmol/L 4 4 4 8 5 1  < > 4 5   CHLORIDE mmol/L 108 106 106  < > 104   CO2 mmol/L 29 29 28  < > 33*   BUN mg/dL 30* 33* 34*  < > 33*   CREATININE mg/dL 1 51* 1 55* 1 62*  < > 1 59*   CALCIUM mg/dL 7 7* 7 9* 8 0*  < > 8 7   TOTAL PROTEIN g/dL  --   --   --   --  5 9*   BILIRUBIN TOTAL mg/dL  --   --   --   --  0 57   ALK PHOS U/L  --   --   --   --  104   ALT U/L  --   --   --   --  10*   AST U/L  --   --   --   --  19   GLUCOSE RANDOM mg/dL 86 97 118  < > 84   < > = values in this interval not displayed      Results from last 7 days  Lab Units 08/26/17 0432 08/21/17  0719   MAGNESIUM mg/dL 2 3 2 6     Lab Results   Component Value Date    PHOS 3 5 08/26/2017        Results from last 7 days  Lab Units 08/26/17  0440 08/26/17  0401 08/25/17 2053 08/25/17  1851 08/25/17  1433 08/25/17  1043   INR  1 09  --   --   --  1 09 2 15*   PTT seconds  --  102* 58* 52* 28  --      No results found for: TROPONINT  ABG:  Lab Results   Component Value Date    PHART 7 415 08/25/2017    [id number] 47 3 (H) 08/25/2017    PO2ART 75 3 08/25/2017    [id number] 29 6 (H) 08/25/2017    BEART 4 5 08/25/2017    SOURCE Brachial, Right 08/25/2017       VTE Pharmacologic Prophylaxis: Heparin gtt  VTE Mechanical Prophylaxis: sequential compression device

## 2017-08-26 NOTE — PROGRESS NOTES
Progress Note - ICU Transfer to Step down/[facility]  [facility] MRN: [id number]    Unit/Bed#: ICU 12 Encounter: [id number]      Code Status: Level 1 - Full Code    Date of ICU admission: 8/25/2017    Reason for ICU adm: L CFA posterior injury s/p L groin exploration, evacuation of hematoma, repair of L femoral artery  Active problems:   Patient Active Problem List   Diagnosis    Chest pain    PAF (paroxysmal atrial fibrillation)    Non-ST elevation myocardial infarction (NSTEMI), type 2    COPD (chronic obstructive pulmonary disease)    SSS (sick sinus syndrome)    Hypertension    Acute on chronic diastolic heart failure    Stage 3 chronic kidney disease    Acute on chronic respiratory failure with hypoxia    Aortic stenosis    Retroperitoneal hematoma    Acute blood loss anemia    Coagulopathy    Retroperitoneal hemorrhage    Hypovolemic shock       Summary of clinical course: 80 y o  female who recently underwent diagnostic cardiac catheterization on 8/21 in pre-operative evaluation for TAVR due to severe symptomatic AS  She had bilateral femoral access  She was doing well at home until yesterday morning at approximately 4 AM at which point she developed acute onset abdominal pain  She has also felt weak  She presented to the emergency department and CT scan of the abdomen and pelvis was performed without contrast due to elevated creatinine at 1 5  She was found to have a large retroperitoneal hematoma and acute blood loss anemia  Her INR was elevated at 2 0 secondary to Coumadin therapy for atrial fibrillation  Her systolic blood pressures were approximately 100  Cordis placed in ED, 2 units trauma blood administered  [address] and vit K given  Vascular surgery was consulted for evaluation  Patient then underwent exploration and repair of L femoral artery  PMH: COPD on 2L, CHF, CAD, A  Fib        CTA abd/pelvis: 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  Large associated left retroperitoneal hematoma  Recent or scheduled procedures: Repair of bleeding L fem artery by vascular surgery  Outstanding/pending diagnostics: AM labs    Hospital discharge planning:  PT/OT, case management, bridge to PO anticoagulation

## 2017-08-26 NOTE — DISCHARGE INSTRUCTIONS
Your wound was closed with staples  These should be evaluated for removal on 9/8  Please see Dr Jonelle Bernabe in the office

## 2017-08-26 NOTE — ASSESSMENT & PLAN NOTE
Assessment: hemorrhagic shock due to left retroperitoenal hematoma due to left femoral bleeding after cardiac cath done recently, worsened with coagulopathy from couamdin use     Plan: 1  Large bore iv access, central access, PRBC administration, reversal of coagulopathy, urgent CTA and find source of bleeding, if bleeding from femoral artery access site then she will need to go to OR for control  If bleeding from iliac artery rupture then she will need angiogram with covered iliac stent placement

## 2017-08-26 NOTE — PROGRESS NOTES
Vascular Surgery   Post-Op Check Progress Note   Paulette Mike 80 y o  female MRN: [id number]  Unit/Bed#: ICU 12 Encounter: [id number]    Assessment and Plan:    70-year-old F with an iatrogenic left CFA posterior injury status post left groin exploration, evacuation of hematoma, control of bleeding from posterior femoral artery on 8/25/17    Plan:   Continue Barrera   Continue A line   Continue left   Neurovascular checks q 1 hour   Monitor H&H   Clear liquid diet  [email or web address]  Fentanyl P R N  for Analgesia  Encourage incentive spirometry use  Heparin gtt and Venodynes for DVT prophylaxis   Pt will be transferred to [facility] once medically stable to leave the ICU      Subjective/Objective     Subjective: Pt states her pain is well controlled  Denies N/V, SOB or chest pain  She is sat-ing at 100% on 3L nasal canula  She hasn't had anything to eat since coming to the floors  She is passing flatus  Objective:     Blood pressure 144/60, pulse 62, temperature 98 7 °F (37 1 °C), temperature source Oral, resp  rate 19, weight 61 7 kg (136 lb), SpO2 99 %  ,Body mass index is 24 87 kg/m²  Intake/Output Summary (Last 24 hours) at 08/25/17 2018  Last data filed at 08/25/17 1801   Gross per 24 hour   Intake          1591 48 ml   Output              350 ml   Net          1241 48 ml       Invasive Devices     Central Venous Catheter Line            CVC Central Lines 08/25/17 Single less than 1 day          Peripheral Intravenous Line            Peripheral IV 08/25/17 Left Forearm less than 1 day          Line            Arterial Sheath 37843 days    Arterial Sheath 5 Fr  Left Femoral 4 days          Drain            Urethral Catheter Latex 16 Fr  less than 1 day                Physical Exam:    GENERAL APPEARANCE: Patient in no acute distress  CV: Regular rate and rhythm; + S1, S2; no murmur/gallops/rubs appreciated  LUNGS: Clear to auscultation; no wheezes/rales/rhonci  ABD: NABS; soft; non-distended; non-tender    Left groin - mepilex dressing in tact - slight amount of sero-sanguinous draiange  Gray are intact  No hematoma, ecchymosis or erythema noted at incision site   EXT: warm and dry   Left Lower - dopp AT, absent PT & DP  Right Lower - dopp AT & PT   SKIN: Warm, dry and well perfused; no rash; no jaundice      Bebeto Felipe  8/25/2017

## 2017-08-27 NOTE — PROGRESS NOTES
Progress Note - Critical Care   Dominick Trivedi 80 y o  female MRN: [id number]  Unit/Bed#: ICU 12 Encounter: [id number]    Attending Physician: Jaimee Stephenson MD      ______________________________________________________________________  Assessment and Plan: L CFA posterior injury s/p left groin exploration and evacuation of hematoma  Neuro:  Analgesia:   Pt has scheduled tylenol and reports pain is well controlled with this  PRN oxy available    CV:   Hemorrhagic Shock secondary to ABLA   Resolved post-op L CFA posterior closure by vascular  HGB remains stable 8 5 from 8 7 on q 4 hour testing however 10 4 yesterday at 14:30  Cont to monitor closely  This AM HGB 8 0, Continues to have slow drift down  HDS  Site without apparent hematoma on exam      Severe AS   For TAVR in the near future  Monitor volume status and heart rate parameters closely to avoid additional strain  HR 48 - 60  Mechanical Valve   Heparin infusion with plan to transition to back to coumadin  Consider starting coumadin tonight if HGB this AM remains stable  Pulm:   No acute issues  Cont hjome O2 of 2 L  Pulm toilet and IS    COPD   No evidence of acute exacerbation    GI:   Tolerating diet  Senna for bowel regimen  Not taking narcotics  :   CKD   Creatinine appears to have returned to baseline  AM labs pending  F/U results  UOP 1L in 24 hours, net + 450 ml in 24  F/E/N:   Pre-renal and cardiac diet  No IVF  Lytes pending from this AM    ID:   Afebrile  No leukocytosis  No evidence of active infection    Heme:   ABLA secondary to retroperitoneal hematoma   HGB has been stable s/p 1 u PRBC on 8/25 (upon presentation), slowly drifting down  Cont to monitor closely  No hematoma palpable at site    Endo:   No history of DM  Glucose WNL on BMPs     Msk/Skin:   Retroperitoneal hematoma   HGB stable, HDS  Left groin exploration   Dressing CDI, no hematoma or bruit  Monitor      OOB, PT/OT today    Disposition: SDII    Code Status: Level 1 - Full Code    Counseling / Coordination of Care  Total time spent today 20 minutes  Greater than 50% of total time was spent with the patient and / or family counseling and / or coordination of care  A description of the counseling / coordination of care: plan reviewed with patient   ______________________________________________________________________    Chief Complaint: "I have a little bit of pain when you push on my groin" Pt deneis pain elsewhere, states she was OOB in the chair yesterday without complication, she denies CP, SOB, headache, vision changes, dizziness, or palpitations  She denies back pain and admits only to pain in her left groin site when you push on it  She denies any foot coolness, numbness, or tingling  24 Hour Events:   OOB yesterday without complication  Downgraded to step down level 2 yesterday     ______________________________________________________________________    Physical Exam:   Constitutional: pt lying inbed sleeping, easily aroused to voice  HEENT: normocaphealic, atraumatic, 3 mm BETY, facial symmetry, clear speech  CV: regular rate and rhythm, + murmur, No edema, +1 DP pulses  Pulm: 3 L NC currently, CTA, no wheezes, rhonchi or crackles, equal bilatearlly, unlabored  Abd: soft, nontender, nondistended, active bowel sounds  Musc: moves all extremities, equal strength bilaterally  Neuro: A&O, no focal deficits  Skin: no rash, left groin site dressed, CDI - underlying staples intact   No hematoma    ______________________________________________________________________  Vitals:    17 0200 17 0300 17 0400 17 0500   BP: 149/63 143/58 133/70 131/58   Pulse: 58 58 56 56   Resp:  15 16 15   Temp:       TempSrc:       SpO2: 98% 99% 100% 100%   Weight:           Temperature:   Temp (24hrs), Av 5 °F (36 9 °C), Min:98 4 °F (36 9 °C), Max:98 7 °F (37 1 °C)    Current Temperature: 98 4 °F (36 9 °C)  Weights:   IBW: -92 5 kg Body mass index is 24 87 kg/m²  Weight (last 2 days)     Date/Time   Weight    08/25/17 0624  61 7 (136)            Hemodynamic Monitoring:  N/A     Non-Invasive/Invasive Ventilation Settings:  Respiratory    Lab Data (Last 4 hours)    None         O2/Vent Data (Last 4 hours)    None              No results found for: PHART, HQM9ISU, PO2ART, IQD7JAX, Q5YEDXMR, BEART, SOURCE  SpO2: SpO2: 100 %  Intake and Outputs:  I/O       08/25 0701 - 08/26 0700 08/26 0701 - 08/27 0700    P  O  240 720    I V  (mL/kg) 1057 3 (17 1) 708 1 (11 5)    Blood 700     IV Piggyback 230 50    Total Intake(mL/kg) 2227 3 (36 1) 1478 1 (24)    Urine (mL/kg/hr) 790 (0 5) 1025 (0 7)    Stool  0 (0)    Total Output 790 1025    Net +1437 3 +453 1          Unmeasured Urine Occurrence  1 x    Unmeasured Stool Occurrence  2 x        UOP: /hour   Nutrition:        Diet Orders            Start     Ordered    08/26/17 0932  Diet Renal; PreRenal (no dialysis); Cardiac Step 1  Diet effective now     Question Answer Comment   Diet Type Renal    Renal PreRenal (no dialysis)    Other Restriction(s): Cardiac Step 1    RD to adjust diet per protocol? No        08/26/17 0932        TF currently running at /hour with a goal of   Formula:  Labs:     Results from last 7 days  Lab Units 08/26/17  1111 08/26/17  0417 08/26/17  0042  08/25/17  1433 08/25/17  1042  08/25/17  0631   WBC Thousand/uL  --  7 09  --   --  11 35* 8 16  --  6 09   HEMOGLOBIN g/dL 8 5* 8 7* 8 5*  < > 10 4* 8 4*  < > 8 9*   HEMATOCRIT % 26 1* 26 0* 25 3*  < > 31 6* 27 1*  < > 28 1*   PLATELETS Thousands/uL  --  143*  --   --  161 180  --  207   NEUTROS PCT %  --  71  --   --   --   --   --  70   MONOS PCT %  --  11  --   --   --   --   --  9   < > = values in this interval not displayed      Results from last 7 days  Lab Units 08/26/17  0432 08/25/17  1851 08/25/17  1433  08/25/17  0631   SODIUM mmol/L 144 140 140  < > 142   POTASSIUM mmol/L 4 4 4 8 5 1  < > 4 5   CHLORIDE mmol/L 108 106 106  < > 104   CO2 mmol/L 29 29 28  < > 33*   BUN mg/dL 30* 33* 34*  < > 33*   CREATININE mg/dL 1 51* 1 55* 1 62*  < > 1 59*   CALCIUM mg/dL 7 7* 7 9* 8 0*  < > 8 7   TOTAL PROTEIN g/dL  --   --   --   --  5 9*   BILIRUBIN TOTAL mg/dL  --   --   --   --  0 57   ALK PHOS U/L  --   --   --   --  104   ALT U/L  --   --   --   --  10*   AST U/L  --   --   --   --  19   GLUCOSE RANDOM mg/dL 86 97 118  < > 84   < > = values in this interval not displayed  Results from last 7 days  Lab Units 08/26/17  0432 08/21/17  0719   MAGNESIUM mg/dL 2 3 2 6     Lab Results   Component Value Date    PHOS 3 5 08/26/2017        Results from last 7 days  Lab Units 08/26/17  1743 08/26/17  1111 08/26/17  0440 08/26/17  0401  08/25/17  1433 08/25/17  1043   INR   --   --  1 09  --   --  1 09 2 15*   PTT seconds 64* 86*  --  102*  < > 28  --    < > = values in this interval not displayed  0  Lab Value Date/Time   TROPONINI 0 21 (H) 04/21/2017 0104   TROPONINI 0 23 (H) 04/20/2017 2217   TROPONINI 0 07 (H) 06/21/2015 1038   TROPONINI 0 04 06/21/2015 0242   TROPONINI 0 06 (H) 06/20/2015 1845   TROPONINI 0 09 (H) 06/19/2015 2316   TROPONINI 0 13 (H) 06/19/2015 1320   TROPONINI 0 10 (H) 05/11/2015 0525   TROPONINI 0 22 (H) 05/11/2015 0031   TROPONINI 0 20 (H) 05/10/2015 1705   TROPONINI <0 04 01/03/2015 0015   TROPONINI <0 04 01/02/2015 1546   TROPONINI <0 04 01/02/2015 0820       Results from last 7 days  Lab Units 08/25/17  1043 08/25/17  0900   LACTIC ACID mmol/L 1 0 1 9     ABG:  Lab Results   Component Value Date    PHART 7 415 08/25/2017    YXB3TCE 47 3 (H) 08/25/2017    PO2ART 75 3 08/25/2017    RDH7OQD 29 6 (H) 08/25/2017    BEART 4 5 08/25/2017    SOURCE Brachial, Right 08/25/2017     Imaging:  I have personally reviewed pertinent reports     and I have personally reviewed pertinent films in PACS  EKG:   Micro:  No results found for: Rubbie Mulling, WOUNDCULT, SPUTUMCULTUR  Allergies: No Known Allergies  Medications: Scheduled Meds:  acetaminophen 650 mg Oral Q6H [facility]   amiodarone 100 mg Oral Once per day on Sun Mon Wed Fri   aspirin 81 mg Oral Daily   atorvastatin 40 mg Oral Daily   citalopram 20 mg Oral Daily   docusate sodium 100 mg Oral BID   furosemide 60 mg Oral BID   metoprolol tartrate 12 5 mg Oral Daily   potassium chloride 10 mEq Oral Daily   senna 1 tablet Oral HS     Continuous Infusions:  heparin (porcine) 3-20 Units/kg/hr (Order-Specific) Last Rate: 12 Units/kg/hr (08/26/17 1829)     PRN Meds:    guaiFENesin 1,200 mg Daily PRN   levalbuterol 1 25 mg Q6H PRN   oxyCODONE 2 5 mg Q4H PRN   oxyCODONE 5 mg Q4H PRN   sodium chloride 3 mL Q6H PRN     VTE Pharmacologic Prophylaxis: Sequential compression device (Venodyne)  and Heparin  VTE Mechanical Prophylaxis: sequential compression device  Invasive lines and devices: Invasive Devices     Peripheral Intravenous Line            Peripheral IV 08/25/17 Left Forearm 1 day    Peripheral IV 08/25/17 Right Forearm 1 day          Line            Arterial Sheath 55477 days    Arterial Sheath 5 Fr  Left Femoral 5 days                     Portions of the record may have been created with voice recognition software  Occasional wrong word or "sound a like" substitutions may have occurred due to the inherent limitations of voice recognition software  Read the chart carefully and recognize, using context, where substitutions have occurred      ABHIJIT Hayes

## 2017-08-27 NOTE — PLAN OF CARE
Problem: Potential for Falls  Goal: Patient will remain free of falls  INTERVENTIONS:  - Assess patient frequently for physical needs  -  Identify cognitive and physical deficits and behaviors that affect risk of falls    -  [place] fall precautions as indicated by assessment   - Educate patient/family on patient safety including physical limitations  - Instruct patient to call for assistance with activity based on assessment  - Modify environment to reduce risk of injury  - Consider OT/PT consult to assist with strengthening/mobility   Outcome: Progressing      Problem: Prexisting or High Potential for Compromised Skin Integrity  Goal: Skin integrity is maintained or improved  INTERVENTIONS:  - Identify patients at risk for skin breakdown  - Assess and monitor skin integrity  - Assess and monitor nutrition and hydration status  - Monitor labs (i e  albumin)  - Assess for incontinence   - Turn and reposition patient  - Assist with mobility/ambulation  - Relieve pressure over bony prominences  - Avoid friction and shearing  - Provide appropriate hygiene as needed including keeping skin clean and dry  - Evaluate need for skin moisturizer/barrier cream  - Collaborate with interdisciplinary team (i e  Nutrition, Rehabilitation, etc )   - Patient/family teaching   Outcome: Progressing      Problem: PAIN - ADULT  Goal: Verbalizes/displays adequate comfort level or baseline comfort level  Interventions:  - Encourage patient to monitor pain and request assistance  - Assess pain using appropriate pain scale  - Administer analgesics based on type and severity of pain and evaluate response  - Implement non-pharmacological measures as appropriate and evaluate response  - Consider cultural and social influences on pain and pain management  - Notify physician/advanced practitioner if interventions unsuccessful or patient reports new pain    Outcome: Progressing      Problem: INFECTION - ADULT  Goal: Absence or prevention of progression during hospitalization  INTERVENTIONS:  - Assess and monitor for signs and symptoms of infection  - Monitor lab/diagnostic results  - Monitor all insertion sites, i e  indwelling lines, tubes, and drains  - Monitor endotracheal (as able) and nasal secretions for changes in amount and color  - [place] appropriate cooling/warming therapies per order  - Administer medications as ordered  - Instruct and encourage patient and family to use good hand hygiene technique  - Identify and instruct in appropriate isolation precautions for identified infection/condition   Outcome: Progressing    Goal: Absence of fever/infection during neutropenic period  INTERVENTIONS:  - Monitor WBC  - Implement neutropenic guidelines   Outcome: Progressing      Problem: SAFETY ADULT  Goal: Maintain or return to baseline ADL function  INTERVENTIONS:  -  Assess patient's ability to carry out ADLs; assess patient's baseline for ADL function and identify physical deficits which impact ability to perform ADLs (bathing, care of mouth/teeth, toileting, grooming, dressing, etc )  - Assess/evaluate cause of self-care deficits   - Assess range of motion  - Assess patient's mobility; develop plan if impaired  - Assess patient's need for assistive devices and provide as appropriate  - Encourage maximum independence but intervene and supervise when necessary  ¯ Involve family in performance of ADLs  ¯ Assess for home care needs following discharge   ¯ Request OT consult to assist with ADL evaluation and planning for discharge  ¯ Provide patient education as appropriate   Outcome: Progressing    Goal: Maintain or return mobility status to optimal level  INTERVENTIONS:  - Assess patient's baseline mobility status (ambulation, transfers, stairs, etc )    - Identify cognitive and physical deficits and behaviors that affect mobility  - Identify mobility aids required to assist with transfers and/or ambulation (gait belt, sit-to-stand, lift, walker, cane, etc )  - Simpson fall precautions as indicated by assessment  - Record patient progress and toleration of activity level on Mobility SBAR; progress patient to next Phase/Stage  - Instruct patient to call for assistance with activity based on assessment  - Request Rehabilitation consult to assist with strengthening/weightbearing, etc    Outcome: Progressing      Problem: DISCHARGE PLANNING  Goal: Discharge to home or other facility with appropriate resources  INTERVENTIONS:  - Identify barriers to discharge w/patient and caregiver  - Arrange for needed discharge resources and transportation as appropriate  - Identify discharge learning needs (meds, wound care, etc )  - Arrange for interpretive services to assist at discharge as needed  - Refer to Case Management Department for coordinating discharge planning if the patient needs post-hospital services based on physician/advanced practitioner order or complex needs related to functional status, cognitive ability, or social support system   Outcome: Progressing      Problem: Knowledge Deficit  Goal: Patient/family/caregiver demonstrates understanding of disease process, treatment plan, medications, and discharge instructions  Complete learning assessment and assess knowledge base  Interventions:  - Provide teaching at level of understanding  - Provide teaching via preferred learning methods   Outcome: Progressing      Problem: Nutrition/Hydration-ADULT  Goal: Nutrient/Hydration intake appropriate for improving, restoring or maintaining nutritional needs  Monitor and assess patient's nutrition/hydration status for malnutrition (ex- brittle hair, bruises, dry skin, pale skin and conjunctiva, muscle wasting, smooth red tongue, and disorientation)  Collaborate with interdisciplinary team and initiate plan and interventions as ordered  Monitor patient's weight and dietary intake as ordered or per policy   Utilize nutrition screening tool and intervene per policy  Determine patient's food preferences and provide high-protein, high-caloric foods as appropriate       INTERVENTIONS:  - Monitor oral intake, urinary output, labs, and treatment plans  - Assess nutrition and hydration status and recommend course of action  - Evaluate amount of meals eaten  - Assist patient with eating if necessary   - Allow adequate time for meals  - Recommend/ encourage appropriate diets, oral nutritional supplements, and vitamin/mineral supplements  - Order, calculate, and assess calorie counts as needed  - Recommend, monitor, and adjust tube feedings and TPN/PPN based on assessed needs  - Assess need for intravenous fluids  - Provide specific nutrition/hydration education as appropriate  - Include patient/family/caregiver in decisions related to nutrition   Outcome: Progressing

## 2017-08-27 NOTE — PROGRESS NOTES
When assessing pt's pulses LLE pulses were non-palpable, used doppler and could not find pulse  Notified [facility] and Nitin Herndon DNP was able to locate a pulse but it was very decreased  Pt's left lower extremity also a little colder then the right  Per SLIM notified to page vascular surgery  Paged vascular surgery x3 with no return call  Notified SLIM did not receive returned call will continue to monitor and will continue to page

## 2017-08-27 NOTE — CASE MANAGEMENT
Initial Clinical Review    Admission: Date/Time/Statement: 8/25/17 @ 0907     Orders Placed This Encounter   Procedures    Inpatient Admission (expected length of stay for this patient is greater than two midnights)     Standing Status:   Standing     Number of Occurrences:   1     Order Specific Question:   Admitting Physician     Answer:   Gloria Carver [[id number]]     Order Specific Question:   Level of Care     Answer:   Critical Care [15]     Order Specific Question:   Estimated length of stay     Answer:   More than 2 Midnights     Order Specific Question:   Certification     Answer:   I certify that inpatient services are medically necessary for this patient for a duration of greater than two midnights  See H&P and MD Progress Notes for additional information about the patient's course of treatment  ED: Date/Time/Mode of Arrival:   ED Arrival Information     Expected Arrival Acuity Means of Arrival Escorted By Service Admission Type    - 8/25/2017 06:15 Emergent Ambulance [facility] Emergency [facility] [address] Emergency    Arrival Complaint    Groin Pain        Chief Complaint:   Chief Complaint   Patient presents with    Groin Pain     pt brought in by EMS for left groin pain/numbness after a cardiac cath on monday  reports indigestion, is SOB     History of Illness: 80 y o  female who presents with new onset left groin pain after having a cardiac catheterization on 8/21with Dr Willie Meeks  She was being evaluated for possible TAVR by Dr Alok Reyes who she saw in the office on 8/24, at which time she was feeling well  She states that her groin pain started early this morning as a pressure like discomfort in the abdomen  She is currently feeling very weak  Denies chest pain/SOB/dizziness, N/V, diarrhea, bloody bowel movements or hematuria  She takes coumadin due to a history of atrial fibrillation  Her daughter in law whom she lives with is present in the room as well       ED Vital Signs:   ED Triage Vitals Temperature Pulse Respirations Blood Pressure SpO2   08/25/17 0624 08/25/17 0624 08/25/17 0624 08/25/17 0624 08/25/17 0624   98 °F (36 7 °C) 55 20 141/60 95 %      Temp Source Heart Rate Source Patient Position BP Location FiO2 (%)   08/25/17 0624 08/25/17 0723 08/25/17 0723 08/25/17 0723 --   Oral Monitor Lying Right arm       Pain Score       08/25/17 0700       7        Wt Readings from Last 1 Encounters:   08/25/17 61 7 kg (136 lb)     O2 2L N/C    Vital Signs (abnormal):   08/26/17 1200  98 7 °F (37 1 °C)   48  19  132/65  91  96 %  Nasal cannula  --  Sitting   08/26/17 1000  --   50  20  146/61  95  97 %  Nasal cannula  --  Lying   08/26/17 0900  --  56  14  148/54  82  97 %  --  --  Lying   08/26/17 0849  --  56  --  132/60  --  --  --  --  --   08/26/17 0800  98 5 °F (36 9 °C)   54  12  132/60  95  99 %  Nasal cannula         Abnormal Labs:    Updated: 08/25/17 0646      WBC 6 09 4 31 - 10 16 Thousand/uL     RBC 3 00 (L) 3 81 - 5 12 Million/uL     Hemoglobin 8 9 (L) 11 5 - 15 4 g/dL     Hematocrit 28 1 (L) 34 8 - 46 1 %      Updated: 08/25/17 0656        Protime 22 9 (H) 12 1 - 14 4 seconds     INR 2 00 (H) 0 86 - 1 16      BUN 33 (H) 5 - 25 mg/dL     Creatinine 1 59 (H) 0 60 - 1 30 mg/dL     Updated: 08/26/17 0101        Hemoglobin 8 5 (L) 11 5 - 15 4 g/dL     Hematocrit 25 3 (L) 34 8 - 46 1 %       Updated: 08/27/17 0609      WBC 6 78 4 31 - 10 16 Thousand/uL     RBC 2 65 (L) 3 81 - 5 12 Million/uL     Hemoglobin 8 0 (L) 11 5 - 15 4 g/dL     Hematocrit 24 8 (L) 34 8 - 46 1 %      Diagnostic Test Results: CT Abd/ Pelvis - Large left retroperitoneal hemorrhage  Persistent renal cortex enhancement consistent with ATN  Cholelithiasis    Small bilateral pleural effusions      ED Treatment:   Date/Time  Temp  Pulse  Resp  BP  MAP (mmHg)  SpO2  O2 Device  Cardiac (WDL)  Patient Position       Medication Administration from 08/25/2017 0615 to 08/25/2017 1202       Date/Time Order Dose Route Action 08/25/2017 0704 ondansetron (ZOFRAN) injection 4 mg 4 mg Intravenous Given     08/25/2017 0723 sodium chloride 0 9 % bolus 500 mL 500 mL Intravenous New Bag     08/25/2017 0829 fentanyl citrate (PF) 100 MCG/2ML 50 mcg 50 mcg Intravenous Given     08/25/2017 0847 sodium chloride 0 9 % bolus 500 mL 500 mL Intravenous New Bag     08/25/2017 1059 phytonadione (AQUA-MEPHYTON) 10 mg/mL 10 mg in sodium chloride 0 9 % 50 mL IVPB 10 mg Intravenous New Bag     08/25/2017 1050 prothrombin complex conc human (KCENTRA) 1,626 Units 1,626 Units Intravenous Given     08/25/2017 1138 iodixanol (VISIPAQUE) 320 MG/ML injection 85 mL 85 mL Intravenous Given        Past Medical/Surgical History: Active Ambulatory Problems     Diagnosis Date Noted    Chest pain 04/20/2017    PAF (paroxysmal atrial fibrillation) 04/20/2017    Non-ST elevation myocardial infarction (NSTEMI), type 2 04/20/2017    COPD (chronic obstructive pulmonary disease) 04/20/2017    SSS (sick sinus syndrome) 04/20/2017    Hypertension 04/20/2017    Acute on chronic diastolic heart failure 31/24/6877    Stage 3 chronic kidney disease 04/20/2017    Acute on chronic respiratory failure with hypoxia 04/21/2017    Aortic stenosis 08/18/2017     Resolved Ambulatory Problems     Diagnosis Date Noted    No Resolved Ambulatory Problems     Past Medical History:   Diagnosis Date    Anemia     Anticoagulated on Coumadin     Anxiety     Atrial fibrillation     CAD (coronary artery disease)     CHF (congestive heart failure)     COPD (chronic obstructive pulmonary disease) with emphysema     Depression     Eczema     HOCM (hypertrophic obstructive cardiomyopathy)     Hyperlipidemia     Hypertension     Lung mass     Requires supplemental oxygen     Sick sinus syndrome     Vitamin B12 deficiency      Admitting Diagnosis: Retroperitoneal hemorrhage [R58]  Dyspnea [R06 00]  Abdominal pain [R10 9]  Groin pain [R10 30]    Age/Sex: 80 y o  female    Assessment/Plan:   Trauma Alert: Evaluation  Model of Arrival: Ambulance    Trauma Active Problems/Plan:   81 y/o female with recent groin instrumentation for cardiac catheterization with retroperitoneal hematoma     Large left sided retroperitoneal hematoma- IR called and recommended vascular surgery evaluation  Vascular surgery recommended CTA Abdomen with IV contrast despite risk of further CRISTIANO, given need to differentiate source of bleeding in order to determine intervention (ie  Stent vs cut down, vs further observation with reversal of INR)  Will obtain CTA and f/u plan with vascular surgery who is present at bedside       Acute blood loss anemia- secondary to [facility]  Transfuse 2 units PRBCs now  ED placing IJ cordis now due to poor peripheral access and need for transfusion       Acute pain due to trauma- tylenol and IV fentanyl as needed for now while NPO     Coumadin coagulapathy- reverse with vitamin K and [address]  Goal INR <1 5  Hold coumadin due to active bleeding       History of atrial fibrillation- hold home aspirin and coumadin given bleeding  Home lopressor on hold presently due to active bleeding       History of severe AS- plans for TAVR in future  Monitor volume status closely       History of CHF- home lasix on hold for now due to bleeding       History of CAD- resume home statin   ASA/BB on hold for now       History of COPD- continue xopenex as needed and home oxygen continuously of 2 liters     History of HTN- hold antihypertensives due to active bleeding      Proph- SCDs, no chemical DVT prophylaxis due to bleeding     Disp- admit to ICU ultimately       Admission Orders:  8/25 OR - S/P CONTROL OF LEFT FEMORAL ARTERY BLEEDING (Left Leg Lower) [32218 CPT(R)]  8/25 Bld Transfusion - RBC 2 units  Neurovascular checks q4h  Vascular Surgery cons  PT/OT eval and treat    Scheduled Meds:   acetaminophen 650 mg Oral Q6H [address]   amiodarone 100 mg Oral Once per day on Sun Mon Wed Fri   aspirin 81 mg Oral Daily   atorvastatin 40 mg Oral Daily   citalopram 20 mg Oral Daily   docusate sodium 100 mg Oral BID   furosemide 60 mg Oral BID   metoprolol tartrate 12 5 mg Oral Daily   potassium chloride 10 mEq Oral Daily   senna 1 tablet Oral HS     Continuous Infusions:   heparin (porcine) 3-20 Units/kg/hr (Order-Specific) Last Rate: 12 Units/kg/hr (08/26/17 5216)     PRN Meds: guaiFENesin    levalbuterol    oxyCODONE    -----------------------------------------------------------------  8/25 Vascular Surgery cons:  Assessment:  Retroperitoneal Hematoma in setting of recent cardiac catheterization 8/21 and anticoagulation w/ coumadin for afib  Acute Blood Loss Anemia  Coumadin Coagulopathy  Severe AS  CAD     Plan:  --Obtain stat CTA A/P to r/o active extravasation   --OR on hold for possible vascular surgical intervention  --Critical care management for resuscitation, IV access, anticoagulation reversal

## 2017-08-28 NOTE — PLAN OF CARE
Problem: OCCUPATIONAL THERAPY ADULT  Goal: Performs self-care activities at highest level of function for planned discharge setting  See evaluation for individualized goals  Treatment Interventions: ADL retraining, Functional transfer training, Endurance training, Patient/family training, Equipment evaluation/education, Compensatory technique education, Continued evaluation, Energy conservation, Activityengagement          See flowsheet documentation for full assessment, interventions and recommendations  Limitation: Decreased ADL status, Decreased endurance, Decreased self-care trans, Decreased high-level ADLs (BALANCE, PAIN, MEDICAL STATUS)  Prognosis: Fair  Assessment: PT IS AN 81 YO F ADMIT W/ C/O L GROIN PAIN S/P CARDIAC CATH 8/21  PT FOUND W/ LARGE L RETROPERITONEAL HEMATOMA, ABLA, ACUTE PAIN, COUMADIN COAGULPATHY AND HYPOVOLEMIC SHOCK  PT UNDERWENT CARDIAC CATH AS PRE-OP W/U FOR TAVR  PT W/ PMH SIGNIFICANT FOR ANEMIA, ANXIETY, AFIB, CAD, CHF, COPD, 2LO2 AT BASELINE, DEPRESSION, HTN, HLD AND SSS  PT IS NOW S/P FEMORAL ARTERY EXPLORATION/REPAIR AND EVACUATION OF L RETROPERITONEAL HEMATOMA 8/25  PT IS FROM HOME W/ FAMILY AND REPORTS BASELINE INDEPENDENCE IN ADLS (SUPERVISION FOR BATHING) WHILE FAMILY ASSISTS W/ IADLS/TRANSPORTATION  PT W/ USE OF RW, SC AND BSC PTA  NO RECENT H/O FALLS  CURRENTLY PT REQUIRING MIN A UB ADLS, MOD A LB ADLS, MIN A BED MOBILITY/FUNCTIONAL TRANSFERS/SHORT DISTANCE AMBULATION USING RW  PT LIMITED AT THIS TIME 2* IMPAIRED BALANCE, ACTIVITY TOLERANCE, PAIN, MEDICAL STATUS, WEAKNESS, DECONDITIONING AND ADL IMPAIRMENTS  OT ANTICIPATES EVENTUAL D/C HOME W/ FAMILY SUPPORT AND HOME OT SERVICES  WILL CONTINUE TO FOLLOW PT 3-5X/WEEK IN ORDER TO MEET THE BELOW DESCRIBED GOALS IN 7-10 DAYS        Discharge Recommendation: Home OT ([address])  OT - OK to Discharge:  (PENDING MEDICAL STABILITY/FUNCTIONAL PROGRESS)

## 2017-08-28 NOTE — PROGRESS NOTES
Alonso deferred this AM by nursing, pt w/ decreased LE pulse  Will follow as appropriate    Gilmer Andrews PT, DPT CSRS

## 2017-08-28 NOTE — PROGRESS NOTES
[facility] Internal Medicine Progress Note  Patient: Arsh Riser 80 y o  female   MRN: [id number]  PCP: Flakito Galdamez MD  Unit/Bed#: PPHP 712-01 Encounter: [id number]  Date Of Visit: 08/28/17    Assessment:  Principal Problem:    Hypovolemic shock  Active Problems:    Retroperitoneal hematoma    Acute blood loss anemia    Coagulopathy    Retroperitoneal hemorrhage    Plan:  Left-Sided Large Retroperitoneal Hematoma: 2/2 a small perforation in the posterior wall of the common femoral artery related to recent access for cardiac cath  · CTA abd 8/25 - 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   Large associated left retroperitoneal hematoma  · Trauma surgery and Vascular surgery following, appreciate recs  · S/p left ilioinguinal nerve block, left femoral artery exploration, repair of bleeding left femoral artery, evacuation of left retroperitoneal hematoma by Dr Liborio Bermudez on 8/25   · Operative findings - Large retroperitoneal hematoma  Active bleeding from the posterior wall of the common femoral artery from arterial puncture site  Densely calcified common femoral artery and proximal SFA    · Transferred to [facility] from ICU on 8/27/17   · Per vascular, ok to resume home Lasix and bridge to Coumadin for MVR   · Management per Vascular surgery     Acute Blood Loss Anemia:   Due to above  S/p 2 units of pRBCs and surgical intervention   S/p Cordis placement in ED 2/2 poor peripheral accesss  HGB stable at 8 0  Monitor counts daily     Mechanical MVR:   OK to bridge to Coumadin  Resumed home dose of Coumadin  C/w Heparin gtt as INR 1 3     Acute Pain:   Likely due to #1  C/w Tylenol Q6hrs and PRN Oxycodone 2 5 mg PRN     Severe AS:   Planned for TVAR in future  Pt was undergoing cardiac catheterization for TVAR evaluation   Monitor volume status closely    Chronic Diastolic CHF:   ECHO 6/51 - LVEF ~50%, no regional wall motion abnormalities, wall thickness was markedly increased OK to resume home Lasix 60 mg BID  Monitor volume status   Follow-up CXR     Chronic respiratory failure with hypoxia:  Likely due to COPD and occasional volume overload  C/w supplemental oxygen and Lasix BID  Nebulizer treatments as needed  F/U CXR     Left arm swelling:  Likely due to IV infiltration  Per dtr request, check venous doppler to r/o blood clot    Atrial Fibrillation, paroxysmal:   C/w home ASA, Coumadin, Lopressor, Amiodarone   Heparin gtt while bridging to Coumadin     COPD:   Maintaining O2 sats on 4 LPM NC  C/w medical management     Hypertension:   C/w Norvasc, BB    CAD s/p CABG:   C/w home medications, ASA, statin, and BB    CKD, stg 3:  Cr at baseline, monitor     VTE Pharmacologic Prophylaxis:   Pharmacologic: Heparin Drip while bridging to Coumadin   Mechanical: Mechanical VTE prophylaxis in place  Patient Centered Rounds: I have performed bedside rounds with nursing staff today  Discussions with Specialists or Other Care Team Provider: Nursing, vascular surgery  Education and Discussions with Family / Patient: I have answered all questions to the best of my ability  Time Spent for Care: 20 minutes  More than 50% of total time spent on counseling and coordination of care as described above  Current Length of Stay: 3 day(s)  Current Patient Status: Inpatient   Certification Statement: The patient will continue to require additional inpatient hospital stay due to Coumadin bridging     Discharge Plan: Patient is not medically stable as she requires Coumadin bridging, hopefully discharge in 24-48 hours  Code Status: Level 1 - Full Code    Subjective:   Feels short of breath with conversation and exertion  Has an episode of shortness of breath during the night which improved after increasing flow rate of oxygen  Report mild, frontal headache  Denies CP, abd pain, N/V/D  Poor appetite       Objective:   Vitals:   Temp (24hrs), Av 3 °F (36 8 °C), Min:98 °F (36 7 °C), Max:98 8 °F (37 1 °C)    HR:  [50-62] 50  Resp:  [16-22] 18  BP: (104-158)/(56-86) 152/68  SpO2:  [92 %-98 %] 98 %  Body mass index is 24 84 kg/m²  Input and Output Summary (last 24 hours): Intake/Output Summary (Last 24 hours) at 08/28/17 1209  Last data filed at 08/28/17 0803   Gross per 24 hour   Intake            273 6 ml   Output              325 ml   Net            -51 4 ml       Physical Exam:     Physical Exam   Constitutional: She is oriented to person, place, and time  She appears well-developed and well-nourished  She is cooperative  No distress  Pleasant female resting in bed on 4LPM NC with mild conversational shortness of breath     HENT:   Head: Normocephalic  Cardiovascular: Normal rate, regular rhythm, S1 normal, S2 normal and normal pulses  BLE cool to touch, L>R  Poor pedal pulses by touch, (+) with doppler   Pulmonary/Chest: Effort normal  No accessory muscle usage  No respiratory distress  She has decreased breath sounds in the right upper field, the right lower field, the left upper field and the left lower field  She has no wheezes  She has no rales  Abdominal: Soft  Bowel sounds are normal  She exhibits no distension  There is no tenderness  There is no rebound and no guarding  Musculoskeletal: She exhibits tenderness (left hip/thigh with palpation)  She exhibits no edema  Neurological: She is alert and oriented to person, place, and time  No sensory deficit  Skin: Skin is warm, dry and intact  She is not diaphoretic  There is pallor  Left groin dressing, C, D, & I   Psychiatric: She has a normal mood and affect   Judgment normal        Additional Data:   Labs:    Results from last 7 days  Lab Units 08/28/17  0537   WBC Thousand/uL 7 54   HEMOGLOBIN g/dL 8 0*   HEMATOCRIT % 24 5*   PLATELETS Thousands/uL 178   NEUTROS PCT % 77*   LYMPHS PCT % 9*   MONOS PCT % 9   EOS PCT % 4       Results from last 7 days  Lab Units 08/28/17  0537  08/25/17  0631   SODIUM mmol/L 139  < > 142 POTASSIUM mmol/L 4 0  < > 4 5   CHLORIDE mmol/L 105  < > 104   CO2 mmol/L 30  < > 33*   BUN mg/dL 35*  < > 33*   CREATININE mg/dL 1 51*  < > 1 59*   CALCIUM mg/dL 8 6  < > 8 7   TOTAL PROTEIN g/dL  --   --  5 9*   BILIRUBIN TOTAL mg/dL  --   --  0 57   ALK PHOS U/L  --   --  104   ALT U/L  --   --  10*   AST U/L  --   --  19   GLUCOSE RANDOM mg/dL 97  < > 84   < > = values in this interval not displayed  Results from last 7 days  Lab Units 08/28/17  0537   INR  1 30*       * I Have Reviewed All Lab Data Listed Above  * Additional Pertinent Lab Tests Reviewed: All Labs Within Last 24 Hours Reviewed    Imaging:    Imaging Reports Reviewed Today Include: Procedure: Xr Chest Portable    Result Date: 8/25/2017  Narrative: CHEST INDICATION:  Cordis placement  COMPARISON:  None VIEWS:   AP frontal IMAGES:  1 FINDINGS:  Left neck wide bore vascular catheter tip terminates around the left upper mediastinum  Course is difficult to evaluate given patient rotation  No change from recent prior  Unchanged cardiomegaly and aortic atherosclerosis  Decreasing pulmonary vascular cephalization  Persistent right lower lobe consolidation and left retrocardiac consolidation  Persistent small bilateral pleural effusions  Osteopenia without acute osseous findings  Impression: 1  Unchanged appearance of the left neck venous catheter  Tip projects over the expected vicinity of the IJ-brachiocephalic junction, though rotation somewhat limits evaluation  2   Unchanged bilateral small pleural effusions with compressive atelectasis  3   Pulmonary vascular congestion appears to be decreasing, somewhat   Workstation performed: [id number]       Cultures:   Blood Culture: No results found for: BLOODCX  Urine Culture: No results found for: URINECX  Sputum Culture: No components found for: SPUTUMCX  Wound Culture: No results found for: WOUNDCULT    Last 24 Hours Medication List:     acetaminophen 650 mg Oral Q6H [address]   amiodarone 100 mg Oral Once per day on Sun Mon Wed Fri   amLODIPine 2 5 mg Oral Daily   aspirin 81 mg Oral Daily   atorvastatin 40 mg Oral Daily   citalopram 20 mg Oral Daily   docusate sodium 100 mg Oral BID   furosemide 60 mg Oral BID   metoprolol tartrate 12 5 mg Oral Daily   potassium chloride 10 mEq Oral Daily   senna 1 tablet Oral HS   warfarin 2 5 mg Oral Once per day on Mon Fri   warfarin 5 mg Oral Once per day on Sun Tue Wed Thu Sat        Today, Patient Was Seen By: ABHIJIT Stout    ** Please Note: Dragon 360 Dictation voice to text software may have been used in the creation of this document   **

## 2017-08-28 NOTE — PROGRESS NOTES
Continuing to try vascular surgery to touch base about cold extremity and inability to auscultate post tib pulse with doppler to no avail, discussing with  how else to get them    Have paged 5 times so far

## 2017-08-28 NOTE — PROGRESS NOTES
On initial assessment this am found rfa IV site terribly infiltrated and dc'd immediately restarted left hand, left groin wound with some small amount purulent drainage tannish and foul smelling and cleansed and redressed, no c/o SOB at this time, left foot cold to touch and cannot auscultate post tibial pulse with doppler can get pedal pulse with doppler no c/o numbness or tingling and called vascular surgery x 2 so far this am

## 2017-08-28 NOTE — PROGRESS NOTES
Have tried again to go thru the vascular center and now they are paging a doctor so they can be updated about LLE

## 2017-08-28 NOTE — PROGRESS NOTES
Left foot feeling cooler again, left pedal doppler less audible than earlier and left post tib still not able to auscultate, have tried paging vasculare thru  again and also now have called the office as well

## 2017-08-28 NOTE — PROGRESS NOTES
Pt c/o sob, increase O2 to 3L 91% on pulse ox, paged respiratory to administer breathing treatment will continue to monitor

## 2017-08-28 NOTE — PROGRESS NOTES
Patient reports that she needs "air "  O2 sat 88% on 3L, O2 increased to 4L  Saturation improved to 93%, patient reports feeling "much better "  Patient remains closely monitored

## 2017-08-28 NOTE — PROGRESS NOTES
Assessed pt after breathing treatment reports feeling much better, no shortness of breath at this time  Pt 90% on 3L O2 will continue to monitor

## 2017-08-28 NOTE — PROGRESS NOTES
Vascular Surgery  Progress Note    Called by [facility] to report nursing trying to contact Vascular Surgery/ Blue team w/o success on mult attempts-- Unfortunately, paging system has been utilizing the wrong pager number to contact us  POD # 3 L femoral artery exploration, repair fof bleeding L femoral artery  Evacuation of L retrop hematoma    Pt seen in evaluation of cool LLE w/ difficulty obtaining pulses  Pt well  Offers no complaints and starts moving and wiggling her toes stating "Im fine !"    VSS  BLEs/ feet equally cool to touch, pale & w/ telangectasias   + M/S  R doppl DP  L doppl PT/ DP- both faint  L groin w/ bandaid w/ serosang drainage  Removed- nsg to replace w/ Mepilex  Staples intact  (-) Erythema  Imp/ Plan:  POD # 3 L femoral artery exploration, repair fof bleeding L femoral artery  Evacuation of L retrop hematoma  -stable postop  -nonthreatened limb  -Mepilex dressing to L groin  -Hibiclens wash to groin daily    -outpt f/u    Mech MVR  Afib  -cont Heparin gtt bridge to Coumadin anticoagulation    ABLA  -H/H stable      John Ivey PA-C  8/28/2017

## 2017-08-28 NOTE — OCCUPATIONAL THERAPY NOTE
[address] Evaluation     Patient Name: Roque Agudelo  DEQVP'P Date: 8/28/2017  Problem List  Patient Active Problem List   Diagnosis    Chest pain    PAF (paroxysmal atrial fibrillation)    Non-ST elevation myocardial infarction (NSTEMI), type 2    COPD (chronic obstructive pulmonary disease)    SSS (sick sinus syndrome)    Hypertension    Acute on chronic diastolic heart failure    Stage 3 chronic kidney disease    Acute on chronic respiratory failure with hypoxia    Aortic stenosis    Retroperitoneal hematoma    Acute blood loss anemia    Coagulopathy    Retroperitoneal hemorrhage    Hypovolemic shock     Past Medical History  Past Medical History:   Diagnosis Date    Anemia     Anticoagulated on Coumadin     Mechanical MVR, AFib    Anxiety     Atrial fibrillation     CAD (coronary artery disease)     CHF (congestive heart failure)     COPD (chronic obstructive pulmonary disease) with emphysema     home O2    Depression     Eczema     HOCM (hypertrophic obstructive cardiomyopathy)     s/p Myomectomy 10/2009    Hyperlipidemia     Hypertension     Lung mass     Requires supplemental oxygen     Sick sinus syndrome     Vitamin B12 deficiency      Past Surgical History  Past Surgical History:   Procedure Laterality Date    CARDIAC CATHETERIZATION  08/21/2017    CARDIAC CATHETERIZATION  2005    CARDIAC CATHETERIZATION      2009    CORONARY ARTERY BYPASS GRAFT  10/2009    CORONARY STENT PLACEMENT  2005    OM    MITRAL VALVE REPLACEMENT  12/2009    Redo sternotomy, Mechanical MVR    MYOMECTOMY  10/2009    HOCM    ND THROMBOENDARTECTMY FEMORAL COMMON Left 8/25/2017    Procedure: CONTROL OF LEFT FEMORAL ARTERY BLEEDING;  Surgeon: Charlie Rand MD;  [facility]: [facility];  Service: Vascular               08/28/17 1235   Note Type   Note type Eval/Treat   Restrictions/Precautions   Weight Bearing Precautions Per Order No   Other Precautions Fall Risk;Pain;Multiple lines; O2;Chair Alarm   Pain Assessment   Pain Assessment 0-10   Pain Score 5   Pain Type Acute pain   Pain Location Groin   Pain Orientation Left   Hospital Pain Intervention(s) Repositioned   Response to Interventions TOLERATED   Home Living   Type of [address] Two level;Performs ADLs on one level;Stairs to enter with rails  (1 ROXANN)   Bathroom Shower/Tub Tub/shower unit   Bathroom Toilet Standard   Bathroom Equipment Shower chair;Commode   Bathroom Accessibility Accessible   Home Equipment Walker;Cane   Additional Comments PT W/ USE OF RW, SC AND BSC PTA  Prior Function   Level of [place] Independent with ADLs and functional mobility; Needs assistance with IADLs   Lives With Son;Daughter  (SON + DAUGHTER IN LAW)   Receives Help From Family   ADL Assistance Independent   IADLs Needs assistance   Falls in the last 6 months 0   Vocational Retired   Lifestyle   Autonomy PT [facility] ASSISTS W/ IADLS/TRANSPORTATION  PT W/ USE OF RW, BSC AND SC PTA  PT REQUIRES 2LO2 AT BASELINE  NO RECENT H/O FALLS  Reciprocal Relationships PT RESIDES WITH SON, DAUGHTER IN LAW AND GRANDDAUGHTER  PT REPORTS GRANDDAUGHTER HOME 24/7 AND CAN ASSIST AS ABLE  Service to Others PT IS RETIRED  PREVIOUSLY WORKED [address] [facility] AT [employer]  Intrinsic Gratification PT ENJOYS WATCHING TV AND WORKING ON PUZZLES     Psychosocial   Psychosocial (WDL) WDL   Subjective   Subjective "I WAS OUT OF BED YESTERDAY"   ADL   Where Assessed Chair   Eating Assistance 5  Supervision/Setup   Grooming Assistance 4  Minimal Assistance   UB Bathing Assistance 4  Minimal Assistance   LB Bathing Assistance 3  Moderate Assistance   UB Dressing Assistance 4  Minimal [address] 3  Moderate [address]  4  Minimal Assistance   Bed Mobility   Supine to Sit 4  Minimal assistance   Additional items Increased time required   Sit to Supine Unable to assess Additional Comments PT LEFT IN ROOM CHAIR POST EVAL W/ CHAIR ALARM ACTIVATED   Transfers   Sit to Stand 4  Minimal assistance   Additional items Assist x 1; Increased time required   Stand to Sit 4  Minimal assistance   Additional items Assist x 1; Increased time required   Functional Mobility   Functional Mobility 4  Minimal assistance   Additional Comments ASSIST X1 USING RW-- PT TOOK FEW STEPS FROM EOB>CHAIR USING RW W/ MIN A   Additional items Rolling walker   Balance   Static Sitting Fair +   Dynamic Sitting Fair   Static Standing Fair -   Dynamic Standing Fair -   Ambulatory Fair -   Activity Tolerance   Activity Tolerance Patient limited by pain   Medical Staff Made Aware F/U W/ CM OSCAR   Nurse Made Aware OKAY TO SEE PER RN KAYLIE   RUE Assessment   RUE Assessment WFL   LUE Assessment   LUE Assessment WFL   Hand Function   Gross Motor Coordination Functional   Fine Motor Coordination Functional   Cognition   Overall Cognitive Status WFL   Arousal/Participation Alert   Attention Within functional limits   Orientation Level Oriented X4   Memory Within functional limits   Following Commands Follows multistep commands without difficulty   Comments PT ENGAGES IN APPROPRIATE CONVERSATION W/ THERAPIST THIS PM    Assessment   Limitation Decreased ADL status; Decreased endurance;Decreased self-care trans;Decreased high-level ADLs  (BALANCE, PAIN, MEDICAL STATUS)   Prognosis Fair   Assessment PT IS AN 79 YO F ADMIT W/ C/O L GROIN PAIN S/P CARDIAC CATH 8/21  PT FOUND W/ LARGE L RETROPERITONEAL HEMATOMA, ABLA, ACUTE PAIN, COUMADIN COAGULPATHY AND HYPOVOLEMIC SHOCK  PT UNDERWENT CARDIAC CATH AS PRE-OP W/U FOR TAVR  PT W/ PMH SIGNIFICANT FOR ANEMIA, ANXIETY, AFIB, CAD, CHF, COPD, 2LO2 AT BASELINE, DEPRESSION, HTN, HLD AND SSS  PT IS NOW S/P FEMORAL ARTERY EXPLORATION/REPAIR AND EVACUATION OF L RETROPERITONEAL HEMATOMA 8/25   PT IS FROM HOME W/ FAMILY AND REPORTS BASELINE INDEPENDENCE IN ADLS (SUPERVISION FOR BATHING) WHILE FAMILY ASSISTS W/ IADLS/TRANSPORTATION  PT W/ USE OF RW, SC AND BSC PTA  NO RECENT H/O FALLS  CURRENTLY PT REQUIRING MIN A UB ADLS, MOD A LB ADLS, MIN A BED MOBILITY/FUNCTIONAL TRANSFERS/SHORT DISTANCE AMBULATION USING RW  PT LIMITED AT THIS TIME 2* IMPAIRED BALANCE, ACTIVITY TOLERANCE, PAIN, MEDICAL STATUS, WEAKNESS, DECONDITIONING AND ADL IMPAIRMENTS  OT ANTICIPATES EVENTUAL D/C HOME W/ FAMILY SUPPORT AND HOME OT SERVICES  WILL CONTINUE TO FOLLOW PT 3-5X/WEEK IN ORDER TO MEET THE BELOW DESCRIBED GOALS IN 7-10 DAYS  Goals   Patient Goals PT WOULD LIKE TO RETURN HOME   LTG Time Frame 7-10   Long Term Goal #1 PLEASE SEE BELOW DESCRIBED GOALS  Plan   Treatment Interventions ADL retraining;Functional transfer training; Endurance training;Patient/family training;Equipment evaluation/education; Compensatory technique education;Continued evaluation; Energy conservation; Activityengagement   Goal Expiration Date 09/07/17   OT Frequency 3-5x/wk   Recommendation   Discharge Recommendation Home OT  (HOME WITH FAMILY SUPPORT)   OT - OK to Discharge (PENDING MEDICAL STABILITY/FUNCTIONAL PROGRESS)   Barthel Index   Feeding 10   Bathing 0   Grooming Score 0   Dressing Score 5   Bladder Score 10   Bowels Score 10   Toilet Use Score 5   Transfers (Bed/Chair) Score 10   Mobility (Level Surface) Score 0   Stairs Score 0   Barthel Index Score 50     GOALS TO BE MET IN 7-10 DAYS    1) Pt will increase bed mobility to SBA and transfer EOB to participate in functional activity with G tolerance and balance  2) Pt will improve functional transfers to SBA on/off all surfaces using DME PRN w/ G balance/safety including toileting  3) Pt will increase independence in all ADLS to SBA with G balance sitting upright in chair  4) Pt will complete toileting w/ SBA w/ G hygiene/thoroughness using DME PRN  5) Pt will improve activity tolerance to G for min 30 min txment sessions      6) Pt will participate in light grooming task with SBA using setup standing at sink ~3-5mins with G safety and balance  7) Pt will demonstrate 100% carryover of learned E C  techniques s/p skilled education w/o cues t/o fx'l I/ADL/leisure interest tasks      DOCUMENTATION COMPLETED BY Mikhail Carter MS, OTR/L

## 2017-08-28 NOTE — PROGRESS NOTES
Pt's pulses reassessed  LLE still colder than right Le  Pedal pulse now easily assessed with doppler  Posterior tibial pulse still absent  Vascular surgery was paged again with no return call  Will continue to monitor

## 2017-08-28 NOTE — SOCIAL WORK
CM met with pt, son Apple Agsutin and daughter in law to discuss CM role and discharge needs  Pt reports she lives with another son, Ramón Barrera and daughter in law [place] in two story home 2 ROXANN  Pt resides on first floor, bed/bathroom on same floor  Pt independent in ADL's, has DME: RW, WC, cane, chair lift, commode, shower chair and O2 @ 2L  Hx IP PT/OT @ [place] and [address] [place] [address]  PCP is Dr Jeanna Martinez; preferred pharmacy is [facility]  Per OT recommendation anticipate discharge home w/PT/OT  Pt and family agreeable to [address] [place] [address]  CM placed referral to same  CM will continue to follow up for discharge needs  Preferred contacts are Ezra Tinoco DIL [phone]; Jassi Sanderson, son [phone] and Donovan Marquez daughter [phone]  CM reviewed d/c planning process including the following: identifying help at home, patient preference for d/c planning needs, Discharge Lounge, [facility] Meds to Bed program, availability of treatment team to discuss questions or concerns patient and/or family may have regarding understanding medications and recognizing signs and symptoms once discharged  CM also encouraged patient to follow up with all recommended appointments after discharge  Patient advised of importance for patient and family to participate in managing patients medical well being

## 2017-08-28 NOTE — PROGRESS NOTES
Discussed pt's o2 at 4L, and the issues getting vascular by phone since last pm, and pulses LLE and earlier coldness of foot, foot is now actually warm to touch, still cannot hear post tib pulse with doppler

## 2017-08-28 NOTE — PLAN OF CARE
Problem: DISCHARGE PLANNING - CARE MANAGEMENT  Goal: Discharge to post-acute care or home with appropriate resources  INTERVENTIONS:  - Conduct assessment to determine patient/family and health care team treatment goals, and need for post-acute services based on payer coverage, community resources, and patient preferences, and barriers to discharge  - Address psychosocial, clinical, and financial barriers to discharge as identified in assessment in conjunction with the patient/family and health care team  - Arrange appropriate level of post-acute services according to patient's   needs and preference and payer coverage in collaboration with the physician and health care team  - Communicate with and update the patient/family, physician, and health care team regarding progress on the discharge plan  - Arrange appropriate transportation to post-acute venues  - Anticipate discharge home w/home PT/OT  Outcome: Progressing

## 2017-08-28 NOTE — PROGRESS NOTES
Pt found with o2 stretched and IV stretched and SCD stretched to BR trying to get into BR on own, chair alarm not going off, when asked why she got up by herself she said that OT told her she could walk to BR by herself, discussed with pt that this is not the case, that she is not to get up on her own, she is quite ESTRADA even minimal exertion

## 2017-08-29 NOTE — CASE MANAGEMENT
Continued Stay Review    Date: 8-29-18      Vital Signs: BP (!) 166/44   Pulse 62   Temp 97 5 °F (36 4 °C) (Oral)   Resp 18   Ht 5' 2" (1 575 m)   Wt 62 3 kg (137 lb 5 6 oz)   SpO2 98%   BMI 25 12 kg/m²     Medications:   Scheduled Meds:   acetaminophen 650 mg Oral Q6H [address]   amiodarone 100 mg Oral Once per day on Sun Mon Wed Fri   amLODIPine 2 5 mg Oral Daily   aspirin 81 mg Oral Daily   atorvastatin 40 mg Oral Daily   citalopram 20 mg Oral Daily   docusate sodium 100 mg Oral BID   furosemide 60 mg Intravenous BID (diuretic)   metoprolol tartrate 12 5 mg Oral Daily   potassium chloride 10 mEq Oral Daily   senna 1 tablet Oral HS   warfarin 2 5 mg Oral Once per day on Mon Fri   warfarin 5 mg Oral Once per day on Sun Tue Wed Thu Sat     Continuous Infusions:   heparin (porcine) 3-20 Units/kg/hr (Order-Specific) Last Rate: 10 Units/kg/hr (08/29/17 1144)     PRN Meds: guaiFENesin    levalbuterol    oxyCODONE    sodium chloride    Abnormal Labs/Diagnostic Results  Age/Sex: 80 y o  female     Assessment/Plan:       Principal Problem:    Hypovolemic shock  Active Problems:    Retroperitoneal hematoma    Acute blood loss anemia    Coagulopathy    Retroperitoneal hemorrhage     Plan:    Left-Sided Large Retroperitoneal Hematoma: 2/2 a small perforation in the posterior wall of the common femoral artery related to recent access for cardiac cath  Trauma surgery and Vascular surgery following, appreciate recs  S/p left ilioinguinal nerve block, left femoral artery exploration, repair of bleeding left femoral artery, evacuation of left retroperitoneal hematoma by Dr Tamela Francisco on 8/25   Operative findings - Large retroperitoneal hematoma  Active bleeding from the posterior wall of the common femoral artery from arterial puncture site  Densely calcified common femoral artery and proximal SFA    Management per vascular surgery  Per vascular, ok to resume home Lasix and bridge to Coumadin for mechanical mitral valve    Acute Blood Loss Anemia: due to above  S/p 2 units of pRBCs and surgical intervention   HGB stable at 8 0 --> 8 6  Monitor counts daily      Mechanical MVR:   OK to bridge to Coumadin per vascular  C/w home Coumadin regimen   C/w Heparin gtt as INR 1 88     Acute on Chronic Diastolic CHF with underlying Severe AS:   ECHO 6/14 - LVEF ~50%, no regional wall motion abnormalities, wall thickness was markedly increased   CXR 8/29 - Congestion with worsening right basilar patchy opacity and effusion  Superimposed pneumonia is not excluded  Change Lasix to 60 mg IV BID  Check sputum culture  Monitor I+O, daily weights, low Na diet      Severe AS:   Planned for TVAR in future  Monitor volume status closely     Chronic respiratory failure with hypoxia:  Likely due to COPD, CHF, and severe As  C/w Lasix 60 mg IV BID and supplemental oxygen  Nebulizer treatments as needed  Monitor closely     Acute Pain:   Likely due to #1  C/w Tylenol Q6hrs and PRN Oxycodone 2 5 mg PRN      Left arm swelling:  Likely due to IV infiltration  Per dtr request, check venous doppler to r/o blood clot     Atrial Fibrillation, paroxysmal:   On Coumadin  INR 1     C/w home ASA, Coumadin, Lopressor, Amiodarone   Heparin gtt while bridging to Coumadin      COPD:   Maintaining O2 sats on 5 LPM NC  C/w medical management      Hypertension:   C/w Norvasc, BB     CAD s/p CABG:   C/w home medications, ASA, statin, and BB     CKD, stg 3:  Cr at baseline, monitor    Principal Problem:    Hypovolemic shock  Active Problems:    Retroperitoneal hematoma    Acute blood loss anemia    Coagulopathy    Retroperitoneal hemorrhage        Discharge Plan:   HOME

## 2017-08-29 NOTE — PROGRESS NOTES
[facility] Progress Note  Patient: Ara Leventhal 80 y o  female   MRN: [id number]  PCP: Aguila Roca MD  Unit/Bed#: [facility] 712-01 Encounter: [id number]  Date Of Visit: 08/29/17    Assessment:  Principal Problem:    Hypovolemic shock  Active Problems:    Retroperitoneal hematoma    Acute blood loss anemia    Coagulopathy    Retroperitoneal hemorrhage    Plan:  Left-Sided Large Retroperitoneal Hematoma: 2/2 a small perforation in the posterior wall of the common femoral artery related to recent access for cardiac cath  Trauma surgery and Vascular surgery following, appreciate recs  S/p left ilioinguinal nerve block, left femoral artery exploration, repair of bleeding left femoral artery, evacuation of left retroperitoneal hematoma by Dr Chris Prader on 8/25   Operative findings - Large retroperitoneal hematoma  Active bleeding from the posterior wall of the common femoral artery from arterial puncture site  Densely calcified common femoral artery and proximal SFA  Management per vascular surgery  Per vascular, ok to resume home Lasix and bridge to Coumadin for mechanical mitral valve      Acute Blood Loss Anemia: due to above  S/p 2 units of pRBCs and surgical intervention   HGB stable at 8 0 --> 8 6  Monitor counts daily     Mechanical MVR:   OK to bridge to Coumadin per vascular  C/w home Coumadin regimen   C/w Heparin gtt as INR 1 88    Acute on Chronic Diastolic CHF with underlying Severe AS:   ECHO 6/14 - LVEF ~50%, no regional wall motion abnormalities, wall thickness was markedly increased   CXR 8/29 - Congestion with worsening right basilar patchy opacity and effusion  Superimposed pneumonia is not excluded     Change Lasix to 60 mg IV BID  Check sputum culture  Monitor I+O, daily weights, low Na diet     Severe AS:   Planned for TVAR in future  Monitor volume status closely    Chronic respiratory failure with hypoxia:  Likely due to COPD, CHF, and severe As  C/w Lasix 60 mg IV BID and supplemental oxygen  Nebulizer treatments as needed  Monitor closely    Acute Pain:   Likely due to #1  C/w Tylenol Q6hrs and PRN Oxycodone 2 5 mg PRN     Left arm swelling:  Likely due to IV infiltration  Per dtr request, check venous doppler to r/o blood clot    Atrial Fibrillation, paroxysmal:   On Coumadin  INR 1  C/w home ASA, Coumadin, Lopressor, Amiodarone   Heparin gtt while bridging to Coumadin     COPD:   Maintaining O2 sats on 5 LPM NC  C/w medical management     Hypertension:   C/w Norvasc, BB    CAD s/p CABG:   C/w home medications, ASA, statin, and BB    CKD, stg 3:  Cr at baseline, monitor     VTE Pharmacologic Prophylaxis:   Pharmacologic: Heparin Drip while bridging to Coumadin   Mechanical: Mechanical VTE prophylaxis in place  Patient Centered Rounds: I have performed bedside rounds with nursing staff today  Discussions with Specialists or Other Care Team Provider: Nursing, vascular surgery  Education and Discussions with Family / Patient: I have answered all questions to the best of my ability  Time Spent for Care: 20 minutes  More than 50% of total time spent on counseling and coordination of care as described above  Current Length of Stay: 4 day(s)  Current Patient Status: Inpatient   Certification Statement: The patient will continue to require additional inpatient hospital stay due to Coumadin bridging and IV Lasix    Discharge Plan: Patient is not medically stable as she requires Coumadin bridging and IV lasix, hopefully discharge in 48 hours  Code Status: Level 1 - Full Code    Subjective:   Overnight continued to feel SOB  This morning she felt increased shortness of breath for which she was given Lasix 60 mg IV x1 with slight improvement  Reports a moist, NP cough  Denies HA, CP, abd pain, N/V/D       Objective:   Vitals:   Temp (24hrs), Av 9 °F (36 6 °C), Min:97 6 °F (36 4 °C), Max:98 2 °F (36 8 °C)    HR:  [61-72] 63  Resp:  [18] 18  BP: (134-189)/(62-71) 134/64  SpO2: [90 %-99 %] 97 %  Body mass index is 25 12 kg/m²  Input and Output Summary (last 24 hours): Intake/Output Summary (Last 24 hours) at 08/29/17 1603  Last data filed at 08/29/17 1300   Gross per 24 hour   Intake              516 ml   Output             1089 ml   Net             -573 ml       Physical Exam:     Physical Exam   Constitutional: She is oriented to person, place, and time  She appears well-developed and well-nourished  She is cooperative  Resting OOB in chair on 5 LPM NC with mildly labored breathing    HENT:   Head: Normocephalic  Neck: JVD present  Cardiovascular: Normal rate, regular rhythm, S1 normal, S2 normal, intact distal pulses and normal pulses  Murmur heard  Pulmonary/Chest: Effort normal  No accessory muscle usage  No respiratory distress  She has no wheezes  She has rales (LLL)  Abdominal: Soft  Bowel sounds are normal  She exhibits no distension  There is no tenderness  Musculoskeletal: She exhibits edema (trace BLE pedal )  She exhibits no tenderness  Neurological: She is alert and oriented to person, place, and time  No sensory deficit  Skin: Skin is warm, dry and intact  She is not diaphoretic  Psychiatric: She has a normal mood and affect   Judgment normal        Additional Data:   Labs:    Results from last 7 days  Lab Units 08/29/17  0650 08/28/17  0537   WBC Thousand/uL 6 96 7 54   HEMOGLOBIN g/dL 8 6* 8 0*   HEMATOCRIT % 26 8* 24 5*   PLATELETS Thousands/uL 211 178   NEUTROS PCT %  --  77*   LYMPHS PCT %  --  9*   MONOS PCT %  --  9   EOS PCT %  --  4       Results from last 7 days  Lab Units 08/29/17  0650  08/25/17  0631   SODIUM mmol/L 143  < > 142   POTASSIUM mmol/L 4 0  < > 4 5   CHLORIDE mmol/L 108  < > 104   CO2 mmol/L 30  < > 33*   BUN mg/dL 31*  < > 33*   CREATININE mg/dL 1 25  < > 1 59*   CALCIUM mg/dL 8 5  < > 8 7   TOTAL PROTEIN g/dL  --   --  5 9*   BILIRUBIN TOTAL mg/dL  --   --  0 57   ALK PHOS U/L  --   --  104   ALT U/L  --   --  10*   AST U/L  --   --  19   GLUCOSE RANDOM mg/dL 111  < > 84   < > = values in this interval not displayed  Results from last 7 days  Lab Units 08/29/17  0651   INR  1 88*       * I Have Reviewed All Lab Data Listed Above  * Additional Pertinent Lab Tests Reviewed: All Labs Within Last 24 Hours Reviewed    Imaging:    Imaging Reports Reviewed Today Include: Procedure: Xr Chest Portable    Result Date: 8/25/2017  Narrative: CHEST INDICATION:  Cordis placement  COMPARISON:  None VIEWS:   AP frontal IMAGES:  1 FINDINGS:  Left neck wide bore vascular catheter tip terminates around the left upper mediastinum  Course is difficult to evaluate given patient rotation  No change from recent prior  Unchanged cardiomegaly and aortic atherosclerosis  Decreasing pulmonary vascular cephalization  Persistent right lower lobe consolidation and left retrocardiac consolidation  Persistent small bilateral pleural effusions  Osteopenia without acute osseous findings  Impression: 1  Unchanged appearance of the left neck venous catheter  Tip projects over the expected vicinity of the IJ-brachiocephalic junction, though rotation somewhat limits evaluation  2   Unchanged bilateral small pleural effusions with compressive atelectasis  3   Pulmonary vascular congestion appears to be decreasing, somewhat   Workstation performed: [id number]       Cultures:   Blood Culture: No results found for: BLOODCX  Urine Culture: No results found for: URINECX  Sputum Culture: No components found for: SPUTUMCX  Wound Culture: No results found for: WOUNDCULT    Last 24 Hours Medication List:     acetaminophen 650 mg Oral Q6H [address]   amiodarone 100 mg Oral Once per day on Sun Mon Wed Fri   amLODIPine 2 5 mg Oral Daily   aspirin 81 mg Oral Daily   atorvastatin 40 mg Oral Daily   citalopram 20 mg Oral Daily   docusate sodium 100 mg Oral BID   furosemide 60 mg Intravenous BID (diuretic)   metoprolol tartrate 12 5 mg Oral Daily   potassium chloride 10 mEq Oral Daily   senna 1 tablet Oral HS   warfarin 2 5 mg Oral Once per day on Mon Fri   warfarin 5 mg Oral Once per day on Sun Tue Wed Thu Sat        Today, Patient Was Seen By: ABHIJIT Looney    ** Please Note: Dragon 360 Dictation voice to text software may have been used in the creation of this document   **

## 2017-08-29 NOTE — PHYSICAL THERAPY NOTE
PHYSICAL THERAPY EVALUATION  NAME: Roxi Madrigal  AGE:   80 y o  MRN:  [id number]  ADMIT DX: Retroperitoneal hemorrhage [R58]  Dyspnea [R06 00]  Abdominal pain [R10 9]  Groin pain [R10 30]    PMH:   Past Medical History:   Diagnosis Date    Anemia     Anticoagulated on Coumadin     Mechanical MVR, AFib    Anxiety     Atrial fibrillation     CAD (coronary artery disease)     CHF (congestive heart failure)     COPD (chronic obstructive pulmonary disease) with emphysema     home O2    Depression     Eczema     HOCM (hypertrophic obstructive cardiomyopathy)     s/p Myomectomy 10/2009    Hyperlipidemia     Hypertension     Lung mass     Requires supplemental oxygen     Sick sinus syndrome     Vitamin B12 deficiency      LENGTH OF STAY: 4     08/29/17 1450   Pain Assessment   Pain Assessment No/denies pain   Pain Score No Pain   Home Living   Type of Home House   Home Layout Stairs to enter with rails; One level  (2 ROXANN)   Home Equipment Walker;Cane   Additional Comments Ambulates with mod I and RW at baseline  Prior Function   Level of [place] Independent with ADLs and functional mobility   Lives With Son  (daughter in law and granddaughter)   Receives Help From Family   ADL Assistance Independent   IADLs Needs assistance   Falls in the last 6 months 0   Vocational Retired   Comments Pt reports family provides 24/7 support  Restrictions/Precautions   Weight Bearing Precautions Per Order No   Other Precautions Impulsive;Cognitive; Chair Alarm; Bed Alarm;Multiple lines;O2;Telemetry; Fall Risk   General   Family/Caregiver Present No   Cognition   Overall Cognitive Status Impaired  (forgetful/confused per nursing)   Arousal/Participation Cooperative   Attention Within functional limits   Orientation Level Oriented X4   Memory Within functional limits   Following Commands Follows one step commands without difficulty   RLE Assessment   RLE Assessment X   Strength RLE   RLE Overall Strength 4/5  (grossly)   LLE Assessment   LLE Assessment X   Strength LLE   LLE Overall Strength 4/5  (grossly)   Bed Mobility   Supine to Sit 4  Minimal assistance   Additional items Increased time required;Assist x 1;Verbal cues;LE management   Sit to Supine Unable to assess  (left OOB in chair with alarm intact post evaluation)   Transfers   Sit to Stand 4  Minimal assistance   Additional items Assist x 1; Increased time required; Impulsive;Verbal cues   Stand to Sit 4  Minimal assistance   Additional items Assist x 1; Increased time required;Verbal cues; Impulsive   Stand pivot 4  Minimal assistance   Additional items Assist x 1; Increased time required;Verbal cues   Ambulation/Elevation   Gait pattern Improper Weight shift; Forward Flexion;Decreased foot clearance;Shuffling; Short stride; Excessively slow   Gait Assistance 4  Minimal assist   Additional items Assist x 1;Verbal cues   Assistive Device None  (pt declining use of RW at this time)   Distance 3` bed to chair  (declines further ambulation at this time)   Balance   Static Sitting Fair +   Dynamic Sitting Fair   Static Standing Fair -   Dynamic Standing Poor +   Ambulatory Poor +   Endurance Deficit   Endurance Deficit Yes   Endurance Deficit Description limited ambulation distance   Activity Tolerance   Activity Tolerance Patient limited by fatigue   Nurse Made Aware Per RN, pt appropriate to evaluate   Assessment   Prognosis Good   Problem List Decreased strength;Decreased endurance; Impaired balance;Decreased mobility; Impaired judgement;Decreased cognition;Decreased safety awareness   Goals   Patient Goals none stated   STG Expiration Date 09/07/17   Short Term Goal #1 Pt will be able to: (1) perform bed mobility with mod I (2) perform sit to stand with mod I (3) ambulate at least 200` with RW and mod I    Treatment Day 0   Plan   Treatment/Interventions Functional transfer training;LE strengthening/ROM; Endurance training; Therapeutic exercise;Patient/family training;Equipment eval/education; Bed mobility;Gait training   PT Frequency 5x/wk   Recommendation   Recommendation Home with family support;Home PT  (if continued 24/7 care)   Equipment Recommended Rai Trejo   PT - OK to Discharge Yes  (if to STR when medically appropriate)   Barthel Index   Feeding 10   Bathing 0   Grooming Score 0   Dressing Score 5   Bladder Score 10   Bowels Score 10   Toilet Use Score 5   Transfers (Bed/Chair) Score 10   Mobility (Level Surface) Score 0   Stairs Score 0   Barthel Index Score 50       Assessment: Pt is a 80 y o  female seen for PT evaluation s/p admit to [facility] on [facility]/25/2017 w/ Hypovolemic shock  Order placed for PT  Comorbidities affecting pt's physical performance listed above  Personal factors affecting pt at time of IE include: steps to enter environment, advanced age, inability to ambulate household distances and limited insight into impairments  Prior to admission, pt was independent w/ all functional mobility w/ RW, lives in one floor environment, has 2 ROXANN and lives with son, daughter in law, and granddaughter  Upon evaluation: Pt requires min A for sit to stand and SPT from bed to chair  Pt currently declining further mobility as well as use of RW  Decreased safety awareness and lack of insight into deficits noted  (Please find full objective findings from PT assessment regarding body systems outlined above)  Impairments and limitations also listed above, especially due to  weakness, impaired balance, decreased endurance, gait deviations, decreased activity tolerance, decreased safety awareness, impaired judgement, fall risk and decreased cognition  The following objective measures performed on IE also reveal limitations: Barthel Index 50/100  Pt's clinical presentation is currently unstable/unpredictable seen in pt's presentation of decreased safety awareness, lack of insight into deficits, and abnormal lab values        Pt to benefit from continued skilled PT tx while in hospital and upon DC to address deficits as defined above and maximize level of functional mobility  From PT/mobility standpoint, recommendation at time of d/c would be Home PT and home with family support pending progress in order to maximize pt's functional independence and consistency w/ mobility in order to facilitate return to PLOF  Recommend progression of ambulation, initiation of HEP, and dynamic balance activities as appropriate        Vannessa Rodriguez, PT,DPT

## 2017-08-29 NOTE — PLAN OF CARE
Problem: PHYSICAL THERAPY ADULT  Goal: Performs mobility at highest level of function for planned discharge setting  See evaluation for individualized goals  Treatment/Interventions: Functional transfer training, LE strengthening/ROM, Endurance training, Therapeutic exercise, Patient/family training, Equipment eval/education, Bed mobility, Gait training  Equipment Recommended: Harvey Phoenix       See flowsheet documentation for full assessment, interventions and recommendations  Outcome: Progressing  Prognosis: Good  Problem List: Decreased strength, Decreased endurance, Impaired balance, Decreased mobility, Impaired judgement, Decreased cognition, Decreased safety awareness  Assessment: Pt tolerated treatment following evaluation well, however limited due to fatigue  Able to participate well in LE and UE therapeutic exercises in sitting  Requires short rest breaks between sets and declines further mobility  All needs met after treatment and chair alarm intact  Will continue to benefit from ongoing skilled PT to maximize her functional mobility and increase her level of independence  Recommending home PT and family support at time of discharge when medically appropriate  Recommendation: Home PT, Home with family support (if continued 24/7 care)     PT - OK to Discharge: Yes (if to STR when medically appropriate)    See flowsheet documentation for full assessment

## 2017-08-29 NOTE — PLAN OF CARE
Problem: PHYSICAL THERAPY ADULT  Goal: Performs mobility at highest level of function for planned discharge setting  See evaluation for individualized goals  Treatment/Interventions: Functional transfer training, LE strengthening/ROM, Endurance training, Therapeutic exercise, Patient/family training, Equipment eval/education, Bed mobility, Gait training  Equipment Recommended: Edith Edwards       See flowsheet documentation for full assessment, interventions and recommendations  Prognosis: Good  Problem List: Decreased strength, Decreased endurance, Impaired balance, Decreased mobility, Impaired judgement, Decreased cognition, Decreased safety awareness  Assessment: Pt is a 80 y o  female seen for PT evaluation s/p admit to [facility] on 8/25/2017 w/ Hypovolemic shock  Order placed for PT  Comorbidities affecting pt's physical performance listed above  Personal factors affecting pt at time of IE include: steps to enter environment, advanced age, inability to ambulate household distances and limited insight into impairments  Prior to admission, pt was independent w/ all functional mobility w/ RW, lives in one floor environment, has 2 ROXANN and lives with son, daughter in law, and granddaughter  Upon evaluation: Pt requires min A for sit to stand and SPT from bed to chair  Pt currently declining further mobility as well as use of RW  Decreased safety awareness and lack of insight into deficits noted  (Please find full objective findings from PT assessment regarding body systems outlined above)  Impairments and limitations also listed above, especially due to  weakness, impaired balance, decreased endurance, gait deviations, decreased activity tolerance, decreased safety awareness, impaired judgement, fall risk and decreased cognition  The following objective measures performed on IE also reveal limitations: Barthel Index 50/100   Pt's clinical presentation is currently unstable/unpredictable seen in pt's presentation of decreased safety awareness, lack of insight into deficits, and abnormal lab values  Pt to benefit from continued skilled PT tx while in hospital and upon DC to address deficits as defined above and maximize level of functional mobility  From PT/mobility standpoint, recommendation at time of d/c would be Home PT and home with family support pending progress in order to maximize pt's functional independence and consistency w/ mobility in order to facilitate return to PLOF  Recommend progression of ambulation, initiation of HEP, and dynamic balance activities as appropriate  Recommendation: Home with family support, Home PT (if continued 24/7 care)     PT - OK to Discharge: Yes (if to STR when medically appropriate)    See flowsheet documentation for full assessment

## 2017-08-29 NOTE — PHYSICAL THERAPY NOTE
PHYSICAL THERAPY TREATMENT NOTE    Patient Name: Krystle Bhatti  VBGML'H Date: 8/29/2017 08/29/17 1500   Pain Assessment   Pain Assessment No/denies pain   Pain Score No Pain   Restrictions/Precautions   Weight Bearing Precautions Per Order No   Other Precautions Impulsive;Cognitive; Chair Alarm; Bed Alarm;Multiple lines;Telemetry;O2;Fall Risk   General   Chart Reviewed Yes   Response to Previous Treatment Patient with no complaints from previous session  Family/Caregiver Present No   Cognition   Overall Cognitive Status Impaired  (forgetful/confused per nursing)   Arousal/Participation Cooperative   Attention Within functional limits   Subjective   Subjective Pt agrees to seated therapeutic exercises only following evaluation  Endurance Deficit   Endurance Deficit Yes   Activity Tolerance   Activity Tolerance Patient limited by fatigue   Nurse Made Aware RN updated on status of pt   Exercises   Knee AROM Long Arc Quad Sitting;15 reps;AROM; Bilateral   Ankle Pumps Sitting;15 reps;AROM; Bilateral   UE Exercise Sitting;15 reps;AROM; Bilateral  (anterior punches, abduction, shoulder flexion)   Marching Sitting;15 reps;AROM; Bilateral   Assessment   Prognosis Good   Problem List Decreased strength;Decreased endurance; Impaired balance;Decreased mobility; Impaired judgement;Decreased cognition;Decreased safety awareness   Assessment Pt tolerated treatment following evaluation well, however limited due to fatigue  Able to participate well in LE and UE therapeutic exercises in sitting  Requires short rest breaks between sets and declines further mobility  All needs met after treatment and chair alarm intact  Will continue to benefit from ongoing skilled PT to maximize her functional mobility and increase her level of independence  Recommending home PT and family support at time of discharge when medically appropriate     Goals   Patient Goals none stated   STG Expiration Date 09/07/17   Treatment Day 1   Plan Treatment/Interventions Functional transfer training;LE strengthening/ROM; Therapeutic exercise; Endurance training;Patient/family training;Equipment eval/education; Bed mobility;Gait training   Progress Progressing toward goals   PT Frequency 5x/wk   Recommendation   Recommendation Home PT; Home with family support  (if continued 24/7 care)   Equipment Recommended Walker   PT - OK to Discharge Yes  (if to STR when medically appropriate)   Prema Tolliver, PT,DPT

## 2017-08-30 NOTE — PROCEDURES
Insert PICC line  Date/Time: 8/30/2017 2:49 PM  Performed by: Gisel Putnam by: En Gramajo     Patient location:  Bedside  Other Assisting Provider: Yes (comment) (cari roberts)    Consent:     Consent obtained:  Written    Consent given by:  Patient    Procedural risks discussed: by md     Alternatives discussed: by md   Universal protocol:     Procedure explained and questions answered to patient or proxy's satisfaction: yes      Relevant documents present and verified: yes      Test results available and properly labeled: yes      Imaging studies available: yes      Required blood products, implants, devices, and special equipment available: yes      Site/side marked: yes      Immediately prior to procedure, a time out was called: yes      Patient identity confirmed:  Arm band and verbally with patient  Pre-procedure details:     Hand hygiene: Hand hygiene performed prior to insertion      Sterile barrier technique: All elements of maximal sterile technique followed      Skin preparation:  2% chlorhexidine    Skin preparation agent: Skin preparation agent completely dried prior to procedure    Indications:     PICC line indications comment:  Amio gtt  Anesthesia (see MAR for exact dosages):      Anesthesia method:  Local infiltration    Local anesthetic:  Lidocaine 1% w/o epi  Procedure details:     Location:  Basilic    Vessel type: vein      Laterality:  Left    Approach: percutaneous technique used      Patient position:  Flat    Procedural supplies:  Double lumen    Catheter size:  5 Fr    Landmarks identified: yes      Ultrasound guidance: yes      Sterile ultrasound techniques: Sterile gel and sterile probe covers were used      Number of attempts:  1    Successful placement: yes      Vessel of catheter tip end:  Chest Xray needed to confirm placement    Total catheter length (cm):  48    Catheter out on skin (cm):  3    Max flow rate:  999    Arm circumference: 29  Post-procedure details:     Post-procedure:  Dressing applied and securement device placed    Assessment:  Placement verification pending x-ray result    Post-procedure complications: none      Patient tolerance of procedure:   Tolerated well, no immediate complications  Comments:      Pink stickers to lumens

## 2017-08-30 NOTE — PROGRESS NOTES
[facility] Internal [facility] Progress Note  Patient: Stephon Yang 80 y o  female   MRN: [id number]  PCP: Regina Jefferson MD  Unit/Bed#: [facility] 712-01 Encounter: [id number]  Date Of Visit: 08/30/17    Assessment:  Principal Problem:    Hypovolemic shock  Active Problems:    Retroperitoneal hematoma    Acute blood loss anemia    Coagulopathy    Retroperitoneal hemorrhage    Plan:  Left-Sided Large Retroperitoneal Hematoma: 2/2 a small perforation in the posterior wall of the common femoral artery related to recent access for cardiac cath  Stable   Trauma surgery and Vascular surgery following, appreciate recs  S/p left ilioinguinal nerve block, left femoral artery exploration, repair of bleeding left femoral artery, evacuation of left retroperitoneal hematoma by Dr Mark Erwin on 8/25   Operative findings - Large retroperitoneal hematoma  Active bleeding from the posterior wall of the common femoral artery from arterial puncture site  Densely calcified common femoral artery and proximal SFA  Management per vascular surgery  Per vascular, ok to resume home Lasix and bridge to Coumadin for mechanical mitral valve    Continue monitoring     Mechanical MVR:   INR 2 34, goal between 2 5-3 5  OK to bridge to Coumadin per vascular  C/w home Coumadin regimen and Heparin gtt    Atrial Fibrillation with RVR:   Troponin level 0 10 x3  Cardiology consulted, appreciate recs   Amiodarone bolus followed by gtt initiated  C/w ASA, Coumadin, Lopressor, Amiodarone  Monitor on continuous telemetry   PICC line insertion for Amio gtt    Acute on Chronic Diastolic CHF with underlying Severe AS:   Likely exacerbated by A fib with RVR and fluid overload s/p resuscitation 2/2 hemorrhagic shock upon admission   ECHO 6/14 - LVEF ~50%, no regional wall motion abnormalities, wall thickness was markedly increased   CXR 8/29 - Congestion with worsening right basilar patchy opacity and effusion  Superimposed pneumonia is not excluded     Cardiology consulted, appreciate recs   C/w Lasix to 60 mg IV BID  Monitor I+O, daily weights, low Na diet     Severe AS:   Planned for possible TVAR in future  Consider risk/benefit of procedure per OP cardiologist and CT surgery     Chronic respiratory failure with hypoxia:  Likely due to COPD, CHF, and severe AS  C/w Lasix 60 mg IV BID and supplemental oxygen  Nebulizer treatments as needed  Monitor closely    Acute Pain:   Likely due to #1  C/w Tylenol Q6hrs and PRN Oxycodone 2 5 mg PRN     Acute Blood Loss Anemia:   Due to #1  Resolved   S/p 2 units of pRBCs and surgical intervention   HGB stable, monitor counts daily     Right arm swelling:  Likely due to IV infiltration  Venous doppler negative for acute DVT or superficial thrombophlebitis     COPD:   C/w medical management     Hypertension:   C/w Norvasc, BB    CAD s/p CABG:   C/w home medications, ASA, statin, and BB    CKD, stg 3:  Cr 1 25, at baseline    VTE Pharmacologic Prophylaxis:   Pharmacologic: Heparin Drip while bridging to Coumadin   Mechanical: Mechanical VTE prophylaxis in place  Patient Centered Rounds: I have performed bedside rounds with nursing staff today  Discussions with Specialists or Other Care Team Provider: Nursing, vascular surgery  Education and Discussions with Family / Patient: I have answered all questions to the best of my ability  Time Spent for Care: 20 minutes  More than 50% of total time spent on counseling and coordination of care as described above  Current Length of Stay: 5 day(s)  Current Patient Status: Inpatient   Certification Statement: The patient will continue to require additional inpatient hospital stay due to Coumadin bridging, IV Lasix, A on C CHF, A fib with RVR    Discharge Plan: Patient is not medically stable as she requires Coumadin bridging and IV lasix, hopefully discharge in 48 hours  Code Status: Level 1 - Full Code    Subjective:   Overnight continued to feel SOB   This morning she felt increased shortness of breath for which she was given Lasix 60 mg IV x1 with slight improvement  Reports a moist, NP cough  Denies HA, CP, abd pain, N/V/D  Objective:   Vitals:   Temp (24hrs), Av 2 °F (36 8 °C), Min:97 5 °F (36 4 °C), Max:99 3 °F (37 4 °C)    HR:  [] 98  Resp:  [18] 18  BP: (109-166)/(44-75) 109/55  SpO2:  [91 %-98 %] 97 %  Body mass index is 26 01 kg/m²  Input and Output Summary (last 24 hours): Intake/Output Summary (Last 24 hours) at 17 1431  Last data filed at 17 1343   Gross per 24 hour   Intake           1237 7 ml   Output             1525 ml   Net           -287 3 ml       Physical Exam:     Physical Exam   Constitutional: She is oriented to person, place, and time  She appears well-developed and well-nourished  She is cooperative  No distress  Frail, pleasant female resting in bed, appears tired, no acute distress, mild labored breathing    HENT:   Head: Normocephalic  Neck: Normal range of motion  Neck supple  JVD present  Cardiovascular: Normal rate, S1 normal, S2 normal, intact distal pulses and normal pulses  An irregularly irregular rhythm present  Murmur heard  Pulmonary/Chest: Effort normal  No accessory muscle usage  No respiratory distress  She has no wheezes  She has rales  Abdominal: Soft  Bowel sounds are normal  She exhibits no distension  Musculoskeletal: Normal range of motion  She exhibits no edema  Neurological: She is alert and oriented to person, place, and time  No sensory deficit  Skin: Skin is warm, dry and intact  She is not diaphoretic  There is pallor  Psychiatric: She has a normal mood and affect   Judgment normal        Additional Data:   Labs:    Results from last 7 days  Lab Units 17  0407   WBC Thousand/uL 9 45   HEMOGLOBIN g/dL 9 2*   HEMATOCRIT % 29 0*   PLATELETS Thousands/uL 260   NEUTROS PCT % 83*   LYMPHS PCT % 4*   MONOS PCT % 10   EOS PCT % 2       Results from last 7 days  Lab Units 17  0407 08/25/17  0631   SODIUM mmol/L 142  < > 142   POTASSIUM mmol/L 4 1  < > 4 5   CHLORIDE mmol/L 106  < > 104   CO2 mmol/L 29  < > 33*   BUN mg/dL 30*  < > 33*   CREATININE mg/dL 1 25  < > 1 59*   CALCIUM mg/dL 8 8  < > 8 7   TOTAL PROTEIN g/dL  --   --  5 9*   BILIRUBIN TOTAL mg/dL  --   --  0 57   ALK PHOS U/L  --   --  104   ALT U/L  --   --  10*   AST U/L  --   --  19   GLUCOSE RANDOM mg/dL 119  < > 84   < > = values in this interval not displayed  Results from last 7 days  Lab Units 08/30/17  0514   INR  2 34*       * I Have Reviewed All Lab Data Listed Above  * Additional Pertinent Lab Tests Reviewed: All Labs Within Last 24 Hours Reviewed    Imaging:    Imaging Reports Reviewed Today Include: Procedure: Xr Chest Portable    Result Date: 8/25/2017  Narrative: CHEST INDICATION:  Cordis placement  COMPARISON:  None VIEWS:   AP frontal IMAGES:  1 FINDINGS:  Left neck wide bore vascular catheter tip terminates around the left upper mediastinum  Course is difficult to evaluate given patient rotation  No change from recent prior  Unchanged cardiomegaly and aortic atherosclerosis  Decreasing pulmonary vascular cephalization  Persistent right lower lobe consolidation and left retrocardiac consolidation  Persistent small bilateral pleural effusions  Osteopenia without acute osseous findings  Impression: 1  Unchanged appearance of the left neck venous catheter  Tip projects over the expected vicinity of the IJ-brachiocephalic junction, though rotation somewhat limits evaluation  2   Unchanged bilateral small pleural effusions with compressive atelectasis  3   Pulmonary vascular congestion appears to be decreasing, somewhat   Workstation performed: [id number]       Cultures:   Blood Culture: No results found for: BLOODCX  Urine Culture: No results found for: URINECX  Sputum Culture: No components found for: SPUTUMCX  Wound Culture: No results found for: WOUNDCULT    Last 24 Hours Medication List: acetaminophen 650 mg Oral Q6H [address]   amiodarone 100 mg Oral Once per day on Sun Mon Wed Fri   amLODIPine 2 5 mg Oral Daily   aspirin 81 mg Oral Daily   atorvastatin 40 mg Oral Daily   citalopram 20 mg Oral Daily   docusate sodium 100 mg Oral BID   furosemide 60 mg Intravenous BID (diuretic)   metoprolol tartrate 12 5 mg Oral Daily   potassium chloride 10 mEq Oral Daily   senna 1 tablet Oral HS   warfarin 2 5 mg Oral Once per day on Mon Fri   warfarin 5 mg Oral Once per day on Sun Tue Wed Thu Sat        Today, Patient Was Seen By: ABHIJIT Scott    ** Please Note: Dragon 360 Dictation voice to text software may have been used in the creation of this document   **

## 2017-08-30 NOTE — OCCUPATIONAL THERAPY NOTE
Therapy session cancelled today secondary to AFIB and AWAITING CARDIOLOGY  Will follow and treat as able    ADELA Meza

## 2017-08-30 NOTE — PROGRESS NOTES
Call received from nursing staff reporting cardiac murmur increasing in intensity and increasiing SOB  Pt with known  severe AS and undergoing evaluation for TAVR  Chronic diastolic HF with 3lb weight gain over 2 days  VSS  Will admin additional 60mg Lasix IV at this time  Continue to monitor  0320:  Notified by nurse that pt is A fib -140 x 10 minutes    On exam pt with NAD  States "I'm tired " (+) JVD  BBSCTA x diminished BLL  Grade IV-V murmur heard best over pulmonic area noted  Nurse reports has voided ~ 400-500mL since IV Lasix  Lopressor 5mg IV x 1 now  If not responsive will admin amiodarone bolus  Case discussed with Dr Kyler De Souza who agrees with plan  Given history of SSS and currently with severe aortic stenosis pending TAVR,  cardiology consulted for further recommendations  0330:  Spoke with cardiology fellow   Agrees with amiodarone bolus, but would like it followed by drip for HR sustained >130bpm

## 2017-08-30 NOTE — PROGRESS NOTES
SPOKE WITH PT'S NURSE KAYLIE WHO CANCELLED TREATMENT SECONDARY TO AFIB AND AWAITING CARDIOLOGY  TREATMENT CANCELLED    Shelby Liu, PTA

## 2017-08-30 NOTE — PROGRESS NOTES
0000: JAVID called because of murmur found on assessment, confirm they are aware  Pt later became SOB alphonse clear lungs, JAVID ordered IV lasix  Around 0200 patient c/o chest pain in right side and center chest  Troponins, EKG, indigestion meds ordered  Troponin came back elevated 0 10  Patient became tachycardic around 115 bpm, JAVID saw patient, ordered IV lopressor, cardiology consulted  JAVID would like to be notified with sustained 130 bpm or greater  Patient has no chest pain at this time, no SOB, resting in bed, continues to be tachycardic  Will continue to monitor patient

## 2017-08-30 NOTE — CONSULTS
Consultation - Cardiology   Judi Alves 80 y o  female MRN: [id number]  Unit/Bed#: [facility] 36-26 Encounter: [id number]    Assessment/Plan     Principal Problem:    Hypovolemic shock  Active Problems:    Retroperitoneal hematoma    Acute blood loss anemia    Coagulopathy    Retroperitoneal hemorrhage      Assessment/Plan    1  Atrial fibrillation with RVR (PAF)  H O DCCV, on oral amiodarone for rhythm management  Went into rapid atrial fibrillation this AM about 0300  She attempt at restoring NSR d/t her severe AS she likely will not tolerate atrial fibrillation well especially with higher HR's  She does have history of bradycardia/SSS thus will try to avoid increasing maintenance AV renuka blockers  I will give an IV amiodarone bolus now    2  CAD- Occlusive RCA disease- ? Eventual PCI  H O CABG - SVG to OM patent  On asa, statin, low dose BB    3  Severe AS- had been in process of potential TAVR w/u    4  Chest pressure/SOB- given additional IV lasix 60mg ( diuresed 2 liters over night)  Troponin 0 10  CXR- congestion with worsening right basilar opacity and effusion    5  Acute on chronic diastolic heart failure  Takes lasix 60mg PO BID, now receving 60mg BID IV which I would continue  Recorded I/0 -1135, however weight up 4 lbs    6  Mechanical MVR  On IV heparin bridging while resuming warfarin  INR 2 3    7  Large retroperitoneal bleed s/p CC- s/p repair - POD #5  Hemorrhagic shock/ABLA- s/p 2UPRBC's    8  COPD- on home oxygen    9  HOCM s/p myectomy    10  Lung mass/ positive PET scan    11  SSS- h o bradycardia  LBBB baseline EKG    12  HTN- on norvasc/BB - -150's    13  CRISTIANO- resolved        History of Present Illness   Physician Requesting Consult: Heather Galvez MD  Reason for Consult / Principal Problem: chest pain, elevated troponin    HPI: Judi Alves is a 80y o  year old female with CAD, low normal EF 50%, severe AS, HTN,  chronic diastolic heart failure, mechanical MVR, atrial fibrillation s/p cardioversion ( on amiodarone), COPD on home oxygen, HOCM s/p myectomy, HLD, HTN, SSS, pulmonary hypertension, lung mass with positive PET scan,  who presents with large left sided retroperitoneal hematoma s/p recent cardiac cathetarization 8/21/2017 ( both left and right femoral artery accessed)  She is POD #5 left femoral artery exploration, repair of bleeding from left femoral artery and evacuation of left retroperitoneal hematoma  She is s/p transfusion 2 UPRBC's for acute blood loss anemia  Currently on IV heparin for mechanical MVR while warfarin on hold  She is known to Dr Davie Marte and is in process of being workup up for potential TAVR  Cardiac cath 8/22/2017 demostrated occlusive disease of OM1 , but a patent graft to OM1, and native ostial RCA  Plan for for CT surgical consult with Dr Lynda Harrington for surgical opinion and AVR  The patient is frustrated and wondering if "any of this is worth it"  She had epigastric/lower chest pain last PM associated with SOB  Troponin was checked and levels are mildly elevated, 0 10 X3  She first developed SOB then chest discomfort and SLIM PA gave additional 60mg IV lasix for work of breathing, JVD, CXR showing increased vascular congestion  She put out about 2 liters of fluid  Few hours later she developed atrial fibrillation with RVR for which she received 5mg IV lopressor  HR currently running in 100's with /55  She feels fairly comfortable  She is on n/c with sat 97%  Inpatient consult to Cardiology  Consult performed by: Nelli Lynn ordered by: Cristiano Mast          Review of Systems   Constitutional: Positive for activity change, fatigue and unexpected weight change  HENT: Negative  Eyes: Negative  Respiratory: Positive for shortness of breath  Cardiovascular: Positive for chest pain, palpitations and leg swelling  Genitourinary: Negative  Musculoskeletal: Positive for gait problem  Skin: Positive for wound  Neurological: Negative for syncope  Hematological: Bruises/bleeds easily  Psychiatric/Behavioral: Negative  Historical Information   Past Medical History:   Diagnosis Date    Anemia     Anticoagulated on Coumadin     Mechanical MVR, AFib    Anxiety     Atrial fibrillation     CAD (coronary artery disease)     CHF (congestive heart failure)     COPD (chronic obstructive pulmonary disease) with emphysema     home O2    Depression     Eczema     HOCM (hypertrophic obstructive cardiomyopathy)     s/p Myomectomy 10/2009    Hyperlipidemia     Hypertension     Lung mass     Requires supplemental oxygen     Sick sinus syndrome     Vitamin B12 deficiency      Past Surgical History:   Procedure Laterality Date    CARDIAC CATHETERIZATION  08/21/2017    CARDIAC CATHETERIZATION  2005    CARDIAC CATHETERIZATION      2009    CORONARY ARTERY BYPASS GRAFT  10/2009    CORONARY STENT PLACEMENT  2005    [facility]    MITRAL VALVE REPLACEMENT  12/2009    Redo sternotomy, Mechanical MVR    MYOMECTOMY  10/2009    HOCM    WA THROMBOENDARTECTMY FEMORAL COMMON Left 8/25/2017    Procedure: CONTROL OF LEFT FEMORAL ARTERY BLEEDING;  Surgeon: Dario Henson MD;  [facility]: [facility];  Service: Vascular     History   Alcohol Use No     History   Drug Use No     History   Smoking Status    Former Smoker   Smokeless Tobacco    Not on file     Family History: History reviewed  No pertinent family history      Meds/Allergies   current meds:   Current Facility-Administered Medications   Medication Dose Route Frequency    acetaminophen (TYLENOL) tablet 650 mg  650 mg Oral Q6H [facility]    amiodarone tablet 100 mg  100 mg Oral Once per day on Sun Mon Wed Fri    amLODIPine (NORVASC) tablet 2 5 mg  2 5 mg Oral Daily    aspirin chewable tablet 81 mg  81 mg Oral Daily    atorvastatin (LIPITOR) tablet 40 mg  40 mg Oral Daily    citalopram (CeleXA) tablet 20 mg  20 mg Oral Daily    docusate sodium (COLACE) capsule 100 mg  100 mg Oral BID    furosemide (LASIX) injection 60 mg  60 mg Intravenous BID (diuretic)    guaiFENesin (MUCINEX) 12 hr tablet 1,200 mg  1,200 mg Oral Daily PRN    heparin (porcine) 25,000 units in 250 mL infusion (premix)  3-20 Units/kg/hr (Order-Specific) Intravenous Titrated    levalbuterol (XOPENEX) inhalation solution 1 25 mg  1 25 mg Nebulization Q6H PRN    metoprolol tartrate (LOPRESSOR) partial tablet 12 5 mg  12 5 mg Oral Daily    oxyCODONE (ROXICODONE) IR tablet 2 5 mg  2 5 mg Oral Q4H PRN    potassium chloride (K-DUR,KLOR-CON) CR tablet 10 mEq  10 mEq Oral Daily    senna (SENOKOT) tablet 8 6 mg  1 tablet Oral HS    sodium chloride 0 9 % inhalation solution 3 mL  3 mL Nebulization Q6H PRN    warfarin (COUMADIN) tablet 2 5 mg  2 5 mg Oral Once per day on Mon Fri    warfarin (COUMADIN) tablet 5 mg  5 mg Oral Once per day on Sun Tue Wed Thu Sat    and PTA meds:  Prescriptions Prior to Admission   Medication    amiodarone 100 mg tablet    amLODIPine (NORVASC) 2 5 mg tablet    aspirin 81 mg chewable tablet    atorvastatin (LIPITOR) 40 mg tablet    captopril (CAPOTEN) 25 mg tablet    citalopram (CeleXA) 20 mg tablet    fluticasone furoate-vilanterol (BREO ELLIPTA)    furosemide (LASIX) 20 mg tablet    guaiFENesin (MUCINEX) 600 mg 12 hr tablet    levalbuterol (XOPENEX) 1 25 mg/3 mL nebulizer solution    loratadine (CLARITIN) 10 mg tablet    metoprolol tartrate (LOPRESSOR) 25 mg tablet    potassium chloride (MICRO-K) 10 MEQ CR capsule    Umeclidinium Bromide (INCRUSE ELLIPTA IN)    warfarin (COUMADIN) 5 mg tablet     No Known Allergies    Objective   Vitals: Blood pressure 132/70, pulse (!) 108, temperature 97 8 °F (36 6 °C), temperature source Oral, resp  rate 18, height 5' 2" (1 575 m), weight 64 5 kg (142 lb 3 2 oz), SpO2 93 %    Orthostatic Blood Pressures    Flowsheet Row Most Recent Value   Blood Pressure  132/70 filed at 08/30/2017 1672   Patient Position  Lying filed at 08/30/2017 6005 Intake/Output Summary (Last 24 hours) at 08/30/17 1058  Last data filed at 08/30/17 0852   Gross per 24 hour   Intake             1144 ml   Output             1975 ml   Net             -831 ml       Invasive Devices     Peripheral Intravenous Line            Peripheral IV 08/28/17 Left Hand 2 days    Peripheral IV 08/30/17 Left Forearm less than 1 day          Line            Arterial Sheath 77176 days    Arterial Sheath 5 Fr  Left Femoral 9 days                Physical Exam: /70   Pulse (!) 108   Temp 97 8 °F (36 6 °C) (Oral)   Resp 18   Ht 5' 2" (1 575 m)   Wt 64 5 kg (142 lb 3 2 oz)   SpO2 93%   BMI 26 01 kg/m²   General Appearance:    Alert, cooperative, no distress, appears fairly comfortable   Head:    Normocephalic, no scleral icterus   Eyes:    PERRL   Nose:   Nares normal, septum midline, mucosa normal, no drainage    Throat:   Lips, mucosa, and tongue normal   Neck:   Supple, symmetrical, trachea midline     +JVD       Lungs:     Crackles bases to auscultation bilaterally, respirations unlabored at 2 liters at rest        Heart:    Iregular rate and rhythm, S1 and S2 normal, systolic murmur 4/6 RUSB, +mechanical click   Abdomen:     Soft, non-tender, bowel sounds active all four quadrants,        Extremities:   Extremities normal, atraumatic, no cyanosis or edema  Left groin d/i       Skin:   Skin color pale   Neurologic:   Alert and oriented to person place and time   No focal deficits       Lab Results:   Recent Results (from the past 72 hour(s))   CBC and differential    Collection Time: 08/28/17  5:37 AM   Result Value Ref Range    WBC 7 54 4 31 - 10 16 Thousand/uL    RBC 2 62 (L) 3 81 - 5 12 Million/uL    Hemoglobin 8 0 (L) 11 5 - 15 4 g/dL    Hematocrit 24 5 (L) 34 8 - 46 1 %    MCV 94 82 - 98 fL    MCH 30 5 26 8 - 34 3 pg    MCHC 32 7 31 4 - 37 4 g/dL    RDW 15 0 11 6 - 15 1 %    MPV 9 4 8 9 - 12 7 fL    Platelets 520 574 - 552 Thousands/uL    nRBC 0 /100 WBCs    Neutrophils Relative 77 (H) 43 - 75 %    Lymphocytes Relative 9 (L) 14 - 44 %    Monocytes Relative 9 4 - 12 %    Eosinophils Relative 4 0 - 6 %    Basophils Relative 1 0 - 1 %    Neutrophils Absolute 5 81 1 85 - 7 62 Thousands/µL    Lymphocytes Absolute 0 67 0 60 - 4 47 Thousands/µL    Monocytes Absolute 0 69 0 17 - 1 22 Thousand/µL    Eosinophils Absolute 0 30 0 00 - 0 61 Thousand/µL    Basophils Absolute 0 04 0 00 - 0 10 Thousands/µL   Basic metabolic panel    Collection Time: 08/28/17  5:37 AM   Result Value Ref Range    Sodium 139 136 - 145 mmol/L    Potassium 4 0 3 5 - 5 3 mmol/L    Chloride 105 100 - 108 mmol/L    CO2 30 21 - 32 mmol/L    Anion Gap 4 4 - 13 mmol/L    BUN 35 (H) 5 - 25 mg/dL    Creatinine 1 51 (H) 0 60 - 1 30 mg/dL    Glucose 97 65 - 140 mg/dL    Calcium 8 6 8 3 - 10 1 mg/dL    eGFR 32 ml/min/1 73sq m   Protime-INR    Collection Time: 08/28/17  5:37 AM   Result Value Ref Range    Protime 16 3 (H) 12 1 - 14 4 seconds    INR 1 30 (H) 0 86 - 1 16   APTT    Collection Time: 08/28/17  5:37 AM   Result Value Ref Range    PTT 48 (H) 23 - 35 seconds   Prepare RBC:Has consent been obtained? Yes, 2 Units    Collection Time: 08/28/17  6:49 AM   Result Value Ref Range    Unit Product Code Y8525N25     Unit Number [id number]     Unit ABO O     Unit RH POS     Crossmatch Compatible     Unit Dispense Status Return to [facility]     Unit Product Code R8517E67     Unit Number [id number]     Unit ABO O     Unit RH POS     Crossmatch Compatible     Unit Dispense Status Return to Inv    Prepare RBC:Has consent been obtained?  Yes, 2 Units    Collection Time: 08/28/17  6:49 AM   Result Value Ref Range    Unit Product Code Q1350L72     Unit Number [id number]     Unit ABO O     Unit [facility] POS     Crossmatch Compatible     Unit Dispense Status Return to [facility]     Unit Product Code H3133F11     Unit Number [id number]     Unit ABO O     Unit RH POS     Crossmatch Compatible     Unit Dispense Status Return to Inv    APTT Collection Time: 08/28/17  4:40 PM   Result Value Ref Range     (H) 23 - 35 seconds   APTT    Collection Time: 08/29/17 12:22 AM   Result Value Ref Range     (H) 23 - 35 seconds   Basic metabolic panel    Collection Time: 08/29/17  6:50 AM   Result Value Ref Range    Sodium 143 136 - 145 mmol/L    Potassium 4 0 3 5 - 5 3 mmol/L    Chloride 108 100 - 108 mmol/L    CO2 30 21 - 32 mmol/L    Anion Gap 5 4 - 13 mmol/L    BUN 31 (H) 5 - 25 mg/dL    Creatinine 1 25 0 60 - 1 30 mg/dL    Glucose 111 65 - 140 mg/dL    Calcium 8 5 8 3 - 10 1 mg/dL    eGFR 40 ml/min/1 73sq m   CBC    Collection Time: 08/29/17  6:50 AM   Result Value Ref Range    WBC 6 96 4 31 - 10 16 Thousand/uL    RBC 2 82 (L) 3 81 - 5 12 Million/uL    Hemoglobin 8 6 (L) 11 5 - 15 4 g/dL    Hematocrit 26 8 (L) 34 8 - 46 1 %    MCV 95 82 - 98 fL    MCH 30 5 26 8 - 34 3 pg    MCHC 32 1 31 4 - 37 4 g/dL    RDW 15 0 11 6 - 15 1 %    Platelets 328 524 - 582 Thousands/uL    MPV 8 8 (L) 8 9 - 12 7 fL   APTT    Collection Time: 08/29/17  6:50 AM   Result Value Ref Range    PTT >210 (HH) 23 - 35 seconds   Protime-INR    Collection Time: 08/29/17  6:51 AM   Result Value Ref Range    Protime 21 8 (H) 12 1 - 14 4 seconds    INR 1 88 (H) 0 86 - 1 16   APTT    Collection Time: 08/29/17  9:16 AM   Result Value Ref Range    PTT 71 (H) 23 - 35 seconds   APTT    Collection Time: 08/29/17  4:40 PM   Result Value Ref Range    PTT 78 (H) 23 - 35 seconds   Troponin I    Collection Time: 08/30/17  2:20 AM   Result Value Ref Range    Troponin I 0 10 (H) <=0 04 ng/mL   APTT    Collection Time: 08/30/17  4:07 AM   Result Value Ref Range     (HH) 23 - 35 seconds   Basic metabolic panel    Collection Time: 08/30/17  4:07 AM   Result Value Ref Range    Sodium 142 136 - 145 mmol/L    Potassium 4 1 3 5 - 5 3 mmol/L    Chloride 106 100 - 108 mmol/L    CO2 29 21 - 32 mmol/L    Anion Gap 7 4 - 13 mmol/L    BUN 30 (H) 5 - 25 mg/dL    Creatinine 1 25 0 60 - 1 30 mg/dL Glucose 119 65 - 140 mg/dL    Calcium 8 8 8 3 - 10 1 mg/dL    eGFR 40 ml/min/1 73sq m   CBC and differential    Collection Time: 08/30/17  4:07 AM   Result Value Ref Range    WBC 9 45 4 31 - 10 16 Thousand/uL    RBC 3 04 (L) 3 81 - 5 12 Million/uL    Hemoglobin 9 2 (L) 11 5 - 15 4 g/dL    Hematocrit 29 0 (L) 34 8 - 46 1 %    MCV 95 82 - 98 fL    MCH 30 3 26 8 - 34 3 pg    MCHC 31 7 31 4 - 37 4 g/dL    RDW 15 5 (H) 11 6 - 15 1 %    MPV 9 5 8 9 - 12 7 fL    Platelets 760 222 - 732 Thousands/uL    nRBC 0 /100 WBCs    Neutrophils Relative 83 (H) 43 - 75 %    Lymphocytes Relative 4 (L) 14 - 44 %    Monocytes Relative 10 4 - 12 %    Eosinophils Relative 2 0 - 6 %    Basophils Relative 1 0 - 1 %    Neutrophils Absolute 7 83 (H) 1 85 - 7 62 Thousands/µL    Lymphocytes Absolute 0 39 (L) 0 60 - 4 47 Thousands/µL    Monocytes Absolute 0 94 0 17 - 1 22 Thousand/µL    Eosinophils Absolute 0 19 0 00 - 0 61 Thousand/µL    Basophils Absolute 0 06 0 00 - 0 10 Thousands/µL   Troponin I    Collection Time: 08/30/17  5:14 AM   Result Value Ref Range    Troponin I 0 10 (H) <=0 04 ng/mL   Protime-INR    Collection Time: 08/30/17  5:14 AM   Result Value Ref Range    Protime 25 9 (H) 12 1 - 14 4 seconds    INR 2 34 (H) 0 86 - 1 16   APTT    Collection Time: 08/30/17  5:14 AM   Result Value Ref Range    PTT 66 (H) 23 - 35 seconds   Troponin I    Collection Time: 08/30/17  8:42 AM   Result Value Ref Range    Troponin I 0 10 (H) <=0 04 ng/mL     The coronary circulation is right dominant  --  Ostial left main: There was a 40 % stenosis  --  1st diagonal: There was a 50 % stenosis  --  Proximal circumflex: There was a 70 % stenosis  --  1st obtuse marginal: The vessel was large sized  The artery was supplied by a patent bypass graft  There was a discrete 80 % stenosis  --  Ostial RCA: There was a 70 % stenosis  This lesion is a likely culprit for the patient's abnormal stress test  It appears amenable to percutaneous intervention    --  Graft to the 1st obtuse marginal: The graft was a saphenous vein graft from the ascending aorta      IMPRESSIONS:  There is significant double vessel coronary artery disease  patent graft to OM1      RECOMMENDATIONS  Consultation be obtained for surgical opinion and aortic valve replacement  +/- PTCA? RA /MELANIE os RCA     Transthoracic Echocardiogram  2D, M-mode, Doppler, and Color Doppler     Study date:  2017     Patient: Margie Blanco  MR number: [id number]  Account number: [de-identified]  : [id number]  Age: 80 years  Gender: Female  Status: Outpatient  Location: [facility]  Height: 62 in  Weight: 141 7 lb  BP: 110/ 62 mmHg     Indications: Endocarditis     Diagnoses: I38  - Endocarditis, valve unspecified     Sonographer:  DOUGLAS Melendez, RDCS  Primary Physician:  Tami Patel MD  Referring Physician:  Adan Kern MD  Group:  [facility]  Interpreting Physician:  Thompson Ahumada, MD     SUMMARY     LEFT VENTRICLE:  Systolic function was at the lower limits of normal by visual assessment  Ejection fraction was estimated to be 50 %  There were no regional wall motion abnormalities  Wall thickness was markedly increased      RIGHT VENTRICLE:  Systolic pressure was markedly increased  Estimated peak pressure was 60 mmHg      LEFT ATRIUM:  The atrium was moderately dilated      MITRAL VALVE:  A mechanical prosthesis was present  It exhibited normal function  There was mild regurgitation  Mean transmitral gradient was 3 9 mmHg      AORTIC VALVE:  The valve was trileaflet  Leaflets exhibited normal thickness, moderate calcification, and moderately reduced cuspal separation  Transaortic velocity was increased due to valvular stenosis  There was severe stenosis    There was moderate to severe regurgitation      TRICUSPID VALVE:  There was mild to moderate regurgitation      PULMONIC VALVE:  There was trace regurgitation      HISTORY: PRIOR HISTORY: CABG; MVR; NSTEMI type II; Hypertension; Atrial fibrillation; Hyperlipidemia; CAD; CHF; COPD; CKD stage 3     PROCEDURE: The study was performed in the [address] [place] [address] and Vascular Center  This was a routine study  The transthoracic approach was used  The study included complete 2D imaging, M-mode, complete spectral Doppler, and color Doppler  The  heart rate was 68 bpm, at the start of the study  Images were obtained from the parasternal, apical, subcostal, and suprasternal notch acoustic windows  Image quality was adequate      LEFT VENTRICLE: Size was normal  Systolic function was at the lower limits of normal by visual assessment  Ejection fraction was estimated to be 50 %  There were no regional wall motion abnormalities  Wall thickness was markedly increased  DOPPLER: The ratio of early ventricular filling to atrial contraction velocities was within the normal range      RIGHT VENTRICLE: The size was normal  Systolic function was normal  DOPPLER: Systolic pressure was markedly increased  Estimated peak pressure was 60 mmHg      LEFT ATRIUM: The atrium was moderately dilated      RIGHT ATRIUM: Size was normal      MITRAL VALVE: A mechanical prosthesis was present  It exhibited normal function  DOPPLER: The transmitral velocity was within the normal range  There was mild regurgitation      AORTIC VALVE: The valve was trileaflet  Leaflets exhibited normal thickness, moderate calcification, and moderately reduced cuspal separation  DOPPLER: Transaortic velocity was increased due to valvular stenosis  There was severe stenosis  There was moderate to severe regurgitation      TRICUSPID VALVE: The valve structure was normal  There was normal leaflet separation  DOPPLER: The transtricuspid velocity was within the normal range  There was no evidence for stenosis  There was mild to moderate regurgitation      PULMONIC VALVE: Leaflets exhibited normal thickness, no calcification, and normal cuspal separation   DOPPLER: The transpulmonic velocity was within the normal range  There was trace regurgitation      PERICARDIUM: There was no pericardial effusion      AORTA: The root exhibited normal size      SYSTEMIC VEINS: IVC: The inferior vena cava was not well visualized      MEASUREMENT TABLES     DOPPLER MEASUREMENTS  Mitral valve   (Reference normals)  Mean gradient   3 9 mmHg   (--)       Imaging: I have personally reviewed pertinent reports  EKG: NSR LBBB  VTE Prophylaxis: Heparin    Code Status: Level 1 - Full Code  Advance Directive and Living Will:      Power of :    POLST:      Counseling / Coordination of Care  Total floor / unit time spent today 60 minutes  Greater than 50% of total time was spent with the patient and / or family counseling and / or coordination of care

## 2017-08-31 NOTE — PLAN OF CARE
DISCHARGE PLANNING     Discharge to home or other facility with appropriate resources Progressing        DISCHARGE PLANNING - CARE MANAGEMENT     Discharge to post-acute care or home with appropriate resources Progressing        INFECTION - ADULT     Absence or prevention of progression during hospitalization Progressing     Absence of fever/infection during neutropenic period Progressing        Knowledge Deficit     Patient/family/caregiver demonstrates understanding of disease process, treatment plan, medications, and discharge instructions Progressing        Nutrition/Hydration-ADULT     Nutrient/Hydration intake appropriate for improving, restoring or maintaining nutritional needs Progressing        PAIN - ADULT     Verbalizes/displays adequate comfort level or baseline comfort level Progressing        Potential for Falls     Patient will remain free of falls Progressing        Prexisting or High Potential for Compromised Skin Integrity     Skin integrity is maintained or improved Progressing        SAFETY ADULT     Maintain or return to baseline ADL function Progressing     Maintain or return mobility status to optimal level Progressing

## 2017-08-31 NOTE — PROGRESS NOTES
[facility] Internal Medicine Progress Note  Patient: Judi Alves 80 y o  female   MRN: [id number]  PCP: Anabela Maria MD  Unit/Bed#: [facility] 712-01 Encounter: [id number]  Date Of Visit: 08/31/17    Assessment:  Principal Problem:    Hypovolemic shock  Active Problems:    Retroperitoneal hematoma    Acute blood loss anemia    Coagulopathy    Retroperitoneal hemorrhage    Plan:  Left-Sided Large Retroperitoneal Hematoma: 2/2 a small perforation in the posterior wall of the common femoral artery related to recent access for cardiac cath  Trauma surgery and vascular surgery following  S/p left ilioinguinal nerve block, left femoral artery exploration, repair of bleeding left femoral artery, evacuation of left retroperitoneal hematoma by Dr Rosette Simental on 8/25   Stable     Acute on Chronic Diastolic CHF:   Likely exacerbated by A fib with RVR and fluid overload s/p resuscitation 2/2 hemorrhagic shock   ECHO 6/14 - LVEF ~50%, no regional wall motion abnormalities, wall thickness was markedly increased   CXR 8/29 - Congestion with worsening right basilar patchy opacity and effusion  Superimposed pneumonia is not excluded     Net output presently +22 8mL, no significant weight change   Cardiology consulted, appreciate recs   C/w Lasix to 60 mg IV BID  K 3 7, c/w KDUR supplement  Monitor I+O Q4hrs, daily weights, low Na diet     Severe AS:   Planned for possible TVAR in future  Consider risk/benefit of procedure per OP cardiologist and CT surgery     Mechanical MVR:   INR 2 92, goal between 2 5-3 5  Discontinue Heparin gtt and c/w home Coumadin regimen     Atrial Fibrillation with RVR:   Troponin level 0 10 x3  Cardiology following, appreciate recs   C/w Amiodarone gtt per cards  C/w ASA, Coumadin, Lopressor, Amiodarone  Monitor on continuous telemetry   PICC line placed for Amio gtt    Chronic respiratory failure with hypoxia:  C/w Lasix 60 mg IV BID, supplemental oxygen, and nebulizer treatments as needed    CKD, stg 3:  Cr 1 25 --> 1 38, around baseline   Monitor Cr closely while on IV diuretics     Acute Pain:   Likely due to #1  C/w Tylenol Q6hrs and PRN Oxycodone 2 5 mg PRN     Acute Blood Loss Anemia:   Due to #1  Resolved   S/p 2 units of pRBCs and surgical intervention   HGB stable, monitor counts daily     Right arm swelling:  Likely due to IV infiltration  Venous doppler negative for acute DVT or superficial thrombophlebitis     COPD:   C/w medical management     Hypertension:   C/w Norvasc, BB    CAD s/p CABG:   C/w home medications, ASA, statin, and BB    VTE Pharmacologic Prophylaxis:   Pharmacologic: Warfarin (Coumadin) while bridging to Coumadin   Mechanical: Mechanical VTE prophylaxis in place  Patient Centered Rounds: I have performed bedside rounds with nursing staff today  Discussions with Specialists or Other Care Team Provider: Nursing, cardiology  Education and Discussions with Family / Patient: I have answered all questions to the best of my ability  Time Spent for Care: 20 minutes  More than 50% of total time spent on counseling and coordination of care as described above  Current Length of Stay: 6 day(s)  Current Patient Status: Inpatient   Certification Statement: The patient will continue to require additional inpatient hospital stay due to acute on chronic CHF, a fib on amio gtt    Discharge Plan: Patient is not medically stable as she requires IV Lasix for CHF and IV Amiodarone for A fib  Plan to discharge to [facility] in 48 hours pending progress  Code Status: Level 1 - Full Code    Subjective:   Feels short of breath  Moist cough with yellow/white sputum production  Denies fever or chills  Denies chest pain or HA  States she is urinating frequently with IV Lasix  Poor appetite       Objective:   Vitals:   Temp (24hrs), Av 3 °F (36 8 °C), Min:97 8 °F (36 6 °C), Max:99 2 °F (37 3 °C)    HR:  [62-98] 66  Resp:  [18] 18  BP: ()/(46-89) 140/73  SpO2:  [91 %-97 %] 97 %  Body mass index is 25 89 kg/m²  Input and Output Summary (last 24 hours): Intake/Output Summary (Last 24 hours) at 08/31/17 1018  Last data filed at 08/31/17 0501   Gross per 24 hour   Intake            788 4 ml   Output              700 ml   Net             88 4 ml       Physical Exam:     Physical Exam   Constitutional: She is oriented to person, place, and time  She appears well-developed  She is cooperative  No distress  Pleasant, frail elderly female resting in bed, dyspnea at rest, sating well on 4 LPM via NC   HENT:   Head: Normocephalic  Neck: JVD present  Cardiovascular: Normal rate, regular rhythm, S1 normal, S2 normal, intact distal pulses and normal pulses  Murmur heard  Pulmonary/Chest: Effort normal  No accessory muscle usage  No respiratory distress  She has decreased breath sounds in the right lower field and the left lower field  She has no wheezes  She has no rhonchi  She has rales in the left middle field and the left lower field  Abdominal: Soft  Bowel sounds are normal  She exhibits no distension  Musculoskeletal: She exhibits no edema or tenderness  Neurological: She is alert and oriented to person, place, and time  No sensory deficit  Skin: Skin is warm, dry and intact  She is not diaphoretic  There is pallor  Psychiatric: She has a normal mood and affect   Her behavior is normal        Additional Data:   Labs:    Results from last 7 days  Lab Units 08/31/17  0501   WBC Thousand/uL 8 87   HEMOGLOBIN g/dL 8 6*   HEMATOCRIT % 27 6*   PLATELETS Thousands/uL 254   NEUTROS PCT % 78*   LYMPHS PCT % 7*   MONOS PCT % 11   EOS PCT % 3       Results from last 7 days  Lab Units 08/31/17  0501  08/25/17  0631   SODIUM mmol/L 139  < > 142   POTASSIUM mmol/L 3 7  < > 4 5   CHLORIDE mmol/L 101  < > 104   CO2 mmol/L 30  < > 33*   BUN mg/dL 34*  < > 33*   CREATININE mg/dL 1 38*  < > 1 59*   CALCIUM mg/dL 8 9  < > 8 7   TOTAL PROTEIN g/dL  --   --  5 9*   BILIRUBIN TOTAL mg/dL  --   --  0 57   ALK PHOS U/L --   --  104   ALT U/L  --   --  10*   AST U/L  --   --  19   GLUCOSE RANDOM mg/dL 102  < > 84   < > = values in this interval not displayed  Results from last 7 days  Lab Units 08/31/17  0501   INR  2 92*       * I Have Reviewed All Lab Data Listed Above  * Additional Pertinent Lab Tests Reviewed: All Labs Within Last 24 Hours Reviewed    Imaging:    Imaging Reports Reviewed Today Include: Procedure: Xr Chest Portable    Result Date: 8/25/2017  Narrative: CHEST INDICATION:  Cordis placement  COMPARISON:  None VIEWS:   AP frontal IMAGES:  1 FINDINGS:  Left neck wide bore vascular catheter tip terminates around the left upper mediastinum  Course is difficult to evaluate given patient rotation  No change from recent prior  Unchanged cardiomegaly and aortic atherosclerosis  Decreasing pulmonary vascular cephalization  Persistent right lower lobe consolidation and left retrocardiac consolidation  Persistent small bilateral pleural effusions  Osteopenia without acute osseous findings  Impression: 1  Unchanged appearance of the left neck venous catheter  Tip projects over the expected vicinity of the IJ-brachiocephalic junction, though rotation somewhat limits evaluation  2   Unchanged bilateral small pleural effusions with compressive atelectasis  3   Pulmonary vascular congestion appears to be decreasing, somewhat   Workstation performed: [id number]       Cultures:   Blood Culture: No results found for: BLOODCX  Urine Culture: No results found for: URINECX  Sputum Culture: No components found for: SPUTUMCX  Wound Culture: No results found for: WOUNDCULT    Last 24 Hours Medication List:     acetaminophen 650 mg Oral Q6H [address]   amiodarone 100 mg Oral Once per day on Sun Mon Wed Fri   amLODIPine 2 5 mg Oral Daily   aspirin 81 mg Oral Daily   atorvastatin 40 mg Oral Daily   citalopram 20 mg Oral Daily   docusate sodium 100 mg Oral BID   furosemide 60 mg Intravenous BID (diuretic)   metoprolol tartrate 12 5 mg Oral Daily   potassium chloride 10 mEq Oral Daily   senna 1 tablet Oral HS   sodium chloride 1,000 mL Intravenous Once   warfarin 2 5 mg Oral Once per day on Mon Fri   warfarin 5 mg Oral Once per day on Sun Tue Wed Thu Sat        Today, Patient Was Seen By: ABHIJIT Mcelroy    ** Please Note: Dragon 360 Dictation voice to text software may have been used in the creation of this document   **

## 2017-08-31 NOTE — PROGRESS NOTES
General Cardiology   Progress Note -  [employer]   Linda Trevino 80 y o  female MRN: [id number]    Unit/Bed#: [facility] 712-01 Encounter: [id number]        Subjective:    No significant events overnight  Patient reports she is feeling "okay " She currently denies SOB but reports symptoms of orthopnea  No fever or chills  No N/V/D  Review of Systems   Constitution: Negative for chills and fever  Cardiovascular: Negative for chest pain, dyspnea on exertion and leg swelling  Respiratory: Negative for shortness of breath  Musculoskeletal: Negative for falls  Gastrointestinal: Negative for diarrhea, nausea and vomiting  Neurological: Negative for dizziness and light-headedness  Psychiatric/Behavioral: Negative for altered mental status  Objective:   Vitals: Blood pressure 137/64, pulse 67, temperature 98 2 °F (36 8 °C), temperature source Oral, resp  rate 18, height 5' 2" (1 575 m), weight 64 2 kg (141 lb 8 6 oz), SpO2 97 %  ,       Body mass index is 25 89 kg/m²  ,     Systolic (95STV), TFB:098 , Min:90 , RHX:488     Diastolic (97UDY), SLW:00, Min:46, Max:89      Orthostatic Blood Pressures    Flowsheet Row Most Recent Value   Blood Pressure  137/64 filed at 08/31/2017 1106   Patient Position  Lying filed at 08/31/2017 1106            Intake/Output Summary (Last 24 hours) at 08/31/17 1320  Last data filed at 08/31/17 1106   Gross per 24 hour   Intake            950 8 ml   Output              750 ml   Net            200 8 ml     Weight (last 2 days)     Date/Time   Weight    08/31/17 0558  64 2 (141 54)    08/30/17 0600  64 5 (142 2)    08/29/17 0600  62 3 (137 35)            Telemetry Review: Normal sinus rhythm HR 70-80s    Physical Exam   Constitutional: She is oriented to person, place, and time  No distress  Neck: Neck supple  No JVD present  Cardiovascular: Normal rate, regular rhythm and intact distal pulses  Murmur heard    Grade II systolic murmur    Pulmonary/Chest: Effort normal and breath sounds normal  No respiratory distress  She has no wheezes  She has no rales  Decreased in bases  4 L NC    Abdominal: Soft  Bowel sounds are normal  She exhibits no distension  There is no tenderness  Musculoskeletal: Normal range of motion  She exhibits no edema  Neurological: She is alert and oriented to person, place, and time  No cranial nerve deficit  Skin: Skin is warm and dry  She is not diaphoretic  Psychiatric: She has a normal mood and affect  Her behavior is normal  Thought content normal        LABORATORY RESULTS    Results from last 7 days  Lab Units 08/30/17  0842 08/30/17  0514 08/30/17  0220   TROPONIN I ng/mL 0 10* 0 10* 0 10*     CBC with diff:   Results from last 7 days  Lab Units 08/31/17  0501 08/30/17  0407 08/29/17  0650 08/28/17  0537 08/27/17  0546 08/26/17  1111 08/26/17  0417  08/25/17  1433  08/25/17  0631   WBC Thousand/uL 8 87 9 45 6 96 7 54 6 78  --  7 09  --  11 35*  < > 6 09   HEMOGLOBIN g/dL 8 6* 9 2* 8 6* 8 0* 8 0* 8 5* 8 7*  < > 10 4*  < > 8 9*   HEMATOCRIT % 27 6* 29 0* 26 8* 24 5* 24 8* 26 1* 26 0*  < > 31 6*  < > 28 1*   MCV fL 98 95 95 94 94  --  91  --  91  < > 94   PLATELETS Thousands/uL 254 260 211 178 159  --  143*  --  161  < > 207   MCH pg 30 4 30 3 30 5 30 5 30 2  --  30 4  --  30 0  < > 29 7   MCHC g/dL 31 2* 31 7 32 1 32 7 32 3  --  33 5  --  32 9  < > 31 7   RDW % 16 0* 15 5* 15 0 15 0 14 9  --  15 1  --  14 5  < > 15 3*   MPV fL 9 0 9 5 8 8* 9 4 9 6  --  9 5  --  9 6  < > 9 6   NRBC AUTO /100 WBCs 0 0  --  0 0  --  0  --   --   --  0   < > = values in this interval not displayed      CMP:  Results from last 7 days  Lab Units 08/31/17  0501 08/30/17  0407 08/29/17  2582 08/28/17  0537 08/27/17  0546 08/26/17  0432 08/25/17  1851  08/25/17  0631   SODIUM mmol/L 139 142 143 139 140 144 140  < > 142   POTASSIUM mmol/L 3 7 4 1 4 0 4 0 4 5 4 4 4 8  < > 4 5   CHLORIDE mmol/L 101 106 108 105 106 108 106  < > 104   CO2 mmol/L 30 29 30 30 26 29 29  < > 33*   ANION GAP mmol/L 8 7 5 4 8 7 5  < > 5   BUN mg/dL 34* 30* 31* 35* 31* 30* 33*  < > 33*   CREATININE mg/dL 1 38* 1 25 1 25 1 51* 1 63* 1 51* 1 55*  < > 1 59*   GLUCOSE RANDOM mg/dL 102 119 111 97 80 86 97  < > 84   CALCIUM mg/dL 8 9 8 8 8 5 8 6 8 3 7 7* 7 9*  < > 8 7   AST U/L  --   --   --   --   --   --   --   --  19   ALT U/L  --   --   --   --   --   --   --   --  10*   ALK PHOS U/L  --   --   --   --   --   --   --   --  104   TOTAL PROTEIN g/dL  --   --   --   --   --   --   --   --  5 9*   ALBUMIN g/dL  --   --   --   --   --   --   --   --  2 8*   BILIRUBIN TOTAL mg/dL  --   --   --   --   --   --   --   --  0 57   EGFR ml/min/1 73sq m 35 40 40 32 29 32 31  < > 30   < > = values in this interval not displayed      BMP:  Results from last 7 days  Lab Units 08/31/17  0501 08/30/17  0407 08/29/17  9457 08/28/17  0537 08/27/17  0546 08/26/17  0432 08/25/17  1851   SODIUM mmol/L 139 142 143 139 140 144 140   POTASSIUM mmol/L 3 7 4 1 4 0 4 0 4 5 4 4 4 8   CHLORIDE mmol/L 101 106 108 105 106 108 106   CO2 mmol/L 30 29 30 30 26 29 29   BUN mg/dL 34* 30* 31* 35* 31* 30* 33*   CREATININE mg/dL 1 38* 1 25 1 25 1 51* 1 63* 1 51* 1 55*   GLUCOSE RANDOM mg/dL 102 119 111 97 80 86 97   CALCIUM mg/dL 8 9 8 8 8 5 8 6 8 3 7 7* 7 9*        Results from last 7 days  Lab Units 08/27/17  0546 08/26/17  0432   MAGNESIUM mg/dL 2 4 2 3       Results from last 7 days  Lab Units 08/31/17  0501 08/30/17  0514 08/29/17  0651 08/28/17  0537 08/26/17  0440 08/25/17  1433 08/25/17  1043   INR  2 92* 2 34* 1 88* 1 30* 1 09 1 09 2 15*       Lipid Profile:   Lab Results   Component Value Date    CHOL 133 08/21/2017     Lab Results   Component Value Date    HDL 60 08/21/2017     Lab Results   Component Value Date    LDLCALC 58 08/21/2017     Lab Results   Component Value Date    TRIG 73 08/21/2017       Cardiac testing:   Results for orders placed during the hospital encounter of 06/14/17   Echo complete with contrast if indicated    Narrative [address]  [phone]    Transthoracic Echocardiogram  2D, M-mode, Doppler, and Color Doppler    Study date:  2017    Patient: Rylan Lopez  MR number: [id number]  Account number: [de-identified]  : [id number]  Age: 80 years  Gender: Female  Status: Outpatient  Location: [facility]  Height: 62 in  Weight: 141 7 lb  BP: 110/ 62 mmHg    Indications: Endocarditis    Diagnoses: I38  - Endocarditis, valve unspecified    Sonographer:  DOUGLAS Pagan, RDCS  Primary Physician:  Emely Zaidi MD  Referring Physician:  Gloria Shen MD  Group:  [facility]  Interpreting Physician:  Mari Darling MD    SUMMARY    LEFT VENTRICLE:  Systolic function was at the lower limits of normal by visual assessment  Ejection fraction was estimated to be 50 %  There were no regional wall motion abnormalities  Wall thickness was markedly increased  RIGHT VENTRICLE:  Systolic pressure was markedly increased  Estimated peak pressure was 60 mmHg  LEFT ATRIUM:  The atrium was moderately dilated  MITRAL VALVE:  A mechanical prosthesis was present  It exhibited normal function  There was mild regurgitation  Mean transmitral gradient was 3 9 mmHg  AORTIC VALVE:  The valve was trileaflet  Leaflets exhibited normal thickness, moderate calcification, and moderately reduced cuspal separation  Transaortic velocity was increased due to valvular stenosis  There was severe stenosis  There was moderate to severe regurgitation  TRICUSPID VALVE:  There was mild to moderate regurgitation  PULMONIC VALVE:  There was trace regurgitation  HISTORY: PRIOR HISTORY: CABG; MVR; NSTEMI type II; Hypertension; Atrial fibrillation; Hyperlipidemia; CAD; CHF; COPD; CKD stage 3    PROCEDURE: The study was performed in the [address] [place] [address] [facility]  This was a routine study  The transthoracic approach was used   The study included complete 2D imaging, M-mode, complete spectral Doppler, and color Doppler  The  heart rate was 68 bpm, at the start of the study  Images were obtained from the parasternal, apical, subcostal, and suprasternal notch acoustic windows  Image quality was adequate  LEFT VENTRICLE: Size was normal  Systolic function was at the lower limits of normal by visual assessment  Ejection fraction was estimated to be 50 %  There were no regional wall motion abnormalities  Wall thickness was markedly increased  DOPPLER: The ratio of early ventricular filling to atrial contraction velocities was within the normal range  RIGHT VENTRICLE: The size was normal  Systolic function was normal  DOPPLER: Systolic pressure was markedly increased  Estimated peak pressure was 60 mmHg  LEFT ATRIUM: The atrium was moderately dilated  RIGHT ATRIUM: Size was normal     MITRAL VALVE: A mechanical prosthesis was present  It exhibited normal function  DOPPLER: The transmitral velocity was within the normal range  There was mild regurgitation  AORTIC VALVE: The valve was trileaflet  Leaflets exhibited normal thickness, moderate calcification, and moderately reduced cuspal separation  DOPPLER: Transaortic velocity was increased due to valvular stenosis  There was severe stenosis  There was moderate to severe regurgitation  TRICUSPID VALVE: The valve structure was normal  There was normal leaflet separation  DOPPLER: The transtricuspid velocity was within the normal range  There was no evidence for stenosis  There was mild to moderate regurgitation  PULMONIC VALVE: Leaflets exhibited normal thickness, no calcification, and normal cuspal separation  DOPPLER: The transpulmonic velocity was within the normal range  There was trace regurgitation  PERICARDIUM: There was no pericardial effusion  AORTA: The root exhibited normal size  SYSTEMIC VEINS: IVC: The inferior vena cava was not well visualized      MEASUREMENT TABLES    DOPPLER MEASUREMENTS  Mitral valve   (Reference normals)  Mean gradient   3 9 mmHg   (--)    SYSTEM MEASUREMENT TABLES    2D  %FS: 29 17 %  AV Diam: 2 44 cm  EDV(Teich): 81 12 ml  EF(Cube): 64 47 %  EF(Teich): 56 32 %  ESV(Cube): 27 41 ml  ESV(Teich): 35 43 ml  IVSd: 2 19 cm  LA Area: 25 74 cm2  LA Diam: 5 61 cm  LVEDV MOD A4C: 102 21 ml  LVEF MOD A4C: 58 85 %  LVESV MOD A4C: 42 06 ml  LVIDd: 4 26 cm  LVIDs: 3 02 cm  LVLd A4C: 8 72 cm  LVLs A4C: 6 96 cm  LVOT Diam: 2 02 cm  LVPWd: 1 41 cm  RA Area: 11 76 cm2  RV Diam: 3 53 cm  SI(Cube): 30 14 ml/m2  SI(Teich): 27 69 ml/m2  SV MOD A4C: 60 15 ml  SV(Cube): 49 73 ml  SV(Teich): 45 69 ml    CW  AR Dec Davidson: 2 68 m/s2  AR Dec Time: 1249 74 ms  AR PHT: 362 42 ms  AR Vmax: 3 35 m/s  AR maxP 95 mmHg  AV Env  Ti: 431 3 ms  AV MaxP 93 mmHg  AV SI: 350 65 ml/m2  AV SV: 578 57 ml  AV VTI: 123 51 cm  AV Vmax: 3 93 m/s  AV Vmean: 2 87 m/s  AV meanP 17 mmHg  MV PHT: 170 49 ms  MV VTI: 73 26 cm  MV Vmax: 1 65 m/s  MV Vmean: 0 93 m/s  MV maxPG: 10 9 mmHg  MV meanP 03 mmHg  MVA By PHT: 1 29 cm2  TR MaxP 45 mmHg  TR Vmax: 3 14 m/s    MM  TAPSE: 1 72 cm    PW  JEAN CARLOS (VTI): 0 71 cm2  JEAN CARLOS Vmax: 0 67 cm2  E': 0 04 m/s  E/E': 37 37  LVOT Env  Ti: 447 32 ms  LVOT VTI: 27 29 cm  LVOT Vmax: 0 82 m/s  LVOT Vmean: 0 61 m/s  LVOT maxP 67 mmHg  LVOT meanP 61 mmHg  LVSI Dopp: 53 06 ml/m2  LVSV Dopp: 87 54 ml  MV A Jefferson: 0 88 m/s  MV Dec Davidson: 3 01 m/s2  MV DecT: 480 27 ms  MV E Jefferson: 1 45 m/s  MV E/A Ratio: 1 64  MVA (VTI): 1 19 cm2    [facility] Accredited Echocardiography Laboratory    Prepared and electronically signed by    Lillian Sam MD  Signed 15-Shemar-2017 08:27:52       Results for orders placed in visit on 06/19/15   NM myocardial perfusion spect (stress and/or rest)    Narrative PHARMACOLOGIC STRESS TEST, LEXISCAN          INDICATION-  Chest pain, dyspnea   COMPARISON- No prior studies,   TECHNIQUE-  Pharmacologic stress testing was performed utilizing   [facility]  SPECT myocardial perfusion images was performed  Dosages of   TC-99-mm Myoview were 9 9 mCi and 32 5 mCi IV  Time to peak imaging   for rest 39 minutes and stress 84 minutes  Both rest and stress images   were performed  Images were then reconstructed in the short, vertical   and horizontal long axes projections  Baseline heart rate 63 beats per minute  Peak heart of 71 beats per minute  Baseline blood pressure 174/85 mmHg  Peak blood pressure 191/70 mmHg  Symptoms-   Shortness of breath, heaviness in arms  Please see cardiology report of physiologic data  FINDINGS-   OVERALL QUALITY-   Acceptable  ARTIFACT-  None  PERFUSION IMAGES-   No ischemia   WALL MOTION-  Global left ventricular hypokinesia  Left ventricular   dilatation  EJECTION FRACTION-  37 %   IMPRESSION-   1  Negative for reversible myocardial ischemia  2   Mild left ventricular dilatation and global hypokinesia     3  Left ventricular ejection fraction- 37%   Transcribed on- REBECA Sargent MD        [place] Radiologist- REBECA Mata MD        Electronically REBECA Cooper MD        Released Date Time- 06/20/15 1230      ------------------------------------------------------------------------------   [facility]      Meds/Allergies   all current active meds have been reviewed and current meds:   Current Facility-Administered Medications   Medication Dose Route Frequency    acetaminophen (TYLENOL) tablet 650 mg  650 mg Oral Q6H [address]    amiodarone (CORDARONE) 900 mg in dextrose 5 % 500 mL infusion  0 5 mg/min Intravenous Continuous    amiodarone tablet 100 mg  100 mg Oral Once per day on Sun Mon Wed Fri    amLODIPine (NORVASC) tablet 2 5 mg  2 5 mg Oral Daily    aspirin chewable tablet 81 mg  81 mg Oral Daily    atorvastatin (LIPITOR) tablet 40 mg  40 mg Oral Daily    citalopram (CeleXA) tablet 20 mg  20 mg Oral Daily    docusate sodium (COLACE) capsule 100 mg  100 mg Oral BID    furosemide (LASIX) injection 60 mg  60 mg Intravenous BID (diuretic)    guaiFENesin (MUCINEX) 12 hr tablet 1,200 mg  1,200 mg Oral Daily PRN    levalbuterol (XOPENEX) inhalation solution 1 25 mg  1 25 mg Nebulization Q6H PRN    metoprolol tartrate (LOPRESSOR) partial tablet 12 5 mg  12 5 mg Oral Daily    oxyCODONE (ROXICODONE) IR tablet 2 5 mg  2 5 mg Oral Q4H PRN    potassium chloride (K-DUR,KLOR-CON) CR tablet 10 mEq  10 mEq Oral BID    senna (SENOKOT) tablet 8 6 mg  1 tablet Oral HS    sodium chloride 0 9 % bolus 1,000 mL  1,000 mL Intravenous Once    sodium chloride 0 9 % inhalation solution 3 mL  3 mL Nebulization Q6H PRN    warfarin (COUMADIN) tablet 2 5 mg  2 5 mg Oral Once per day on Mon Fri    warfarin (COUMADIN) tablet 5 mg  5 mg Oral Once per day on Sun Tue Wed Thu Sat     Prescriptions Prior to Admission   Medication    amiodarone 100 mg tablet    amLODIPine (NORVASC) 2 5 mg tablet    aspirin 81 mg chewable tablet    atorvastatin (LIPITOR) 40 mg tablet    captopril (CAPOTEN) 25 mg tablet    citalopram (CeleXA) 20 mg tablet    fluticasone furoate-vilanterol (BREO ELLIPTA)    furosemide (LASIX) 20 mg tablet    guaiFENesin (MUCINEX) 600 mg 12 hr tablet    levalbuterol (XOPENEX) 1 25 mg/3 mL nebulizer solution    loratadine (CLARITIN) 10 mg tablet    metoprolol tartrate (LOPRESSOR) 25 mg tablet    potassium chloride (MICRO-K) 10 MEQ CR capsule    Umeclidinium Bromide (INCRUSE ELLIPTA IN)    warfarin (COUMADIN) 5 mg tablet         amiodarone 0 5 mg/min Last Rate: 0 5 mg/min (08/30/17 1955)     Assessment/ Plan    1  Acute on chronic diastolic heart failure- Chest xray yesterday showed bilateral pleural effusions  Currently on Furosemide 60 mg IV BID   Monitor I/Os inaccurate due to incontinence + 128 ml   Daily weights 141 lbs from 142 lbs    2   Atrial fibrillation with RVR (PAF) currently in sinus rhythm   On amiodarone gtt and amiodarone PO   She does have history of bradycardia/SSS thus will try to avoid increasing maintenance AV renuka blockers      3  CAD- Occlusive RCA disease- ? Eventual PCI  H O CABG - SVG to OM patent  On asa, statin, low dose BB     4  Severe AS- had been in process of potential TAVR w/u     5  Mechanical MVR  INR improved: 2 92 today, heparin gtt discontinued   Continue coumadin      7  Large retroperitoneal bleed s/p CC 8/21/17- s/p repair - POD #6  Hemorrhagic shock/ABLA- s/p 2UPRBC's     8  COPD- on O2  Wears 2 L NC at home, currently on 4 L NC      9  HOCM s/p myectomy     10  SSS- h o bradycardia  LBBB baseline EKG     11  HTN- on norvasc/BB  Average BP: 128/65     Counseling / Coordination of Care  Total floor / unit time spent today 25 minutes  Greater than 50% of total time was spent with the patient and / or family counseling and / or coordination of care  ** Please Note: Dragon 360 Dictation voice to text software may have been used in the creation of this document   **

## 2017-08-31 NOTE — PHYSICAL THERAPY NOTE
PHYSICAL THERAPY TREATMENT     08/31/17 1519   Pain Assessment   Pain Assessment No/denies pain   Pain Score No Pain   Restrictions/Precautions   Weight Bearing Precautions Per Order No   Other Precautions Fall Risk;O2;Multiple lines; Chair Alarm   General   Chart Reviewed Yes   Response to Previous Treatment Patient reporting fatigue but able to participate  Family/Caregiver Present Yes   Cognition   Overall Cognitive Status WFL   Arousal/Participation Alert; Cooperative   Attention Within functional limits   Orientation Level Oriented X4   Following Commands Follows one step commands without difficulty   Subjective   Subjective pt agreeable to participate- just finishing w/ OT session  Transfers   Sit to Stand 4  Minimal assistance   Additional items Assist x 1; Increased time required; Impulsive   Stand to Sit 4  Minimal assistance   Additional items Assist x 1; Increased time required;Verbal cues   Stand pivot 4  Minimal assistance   Additional items Assist x 1; Increased time required;Verbal cues   Ambulation/Elevation   Gait pattern Narrow MINA; Forward Flexion;Decreased foot clearance; Excessively slow   Gait Assistance 4  Minimal assist   Additional items Assist x 1;Verbal cues   Assistive Device Rolling walker   Distance 30'x1; 10'x2   Balance   Static Sitting Good   Dynamic Sitting Fair   Static Standing Fair -   Dynamic Standing Poor +   Ambulatory Poor +  (RW)   Endurance Deficit   Endurance Deficit Yes   Activity Tolerance   Activity Tolerance Patient limited by fatigue   Nurse Made Aware HOMERO Lazo Shutters updated and aware of decreased spo2 w/ activity   Exercises   Hip Flexion Sitting;15 reps;AROM; Right;Left  (x2)   Hip Abduction Sitting;15 reps;Right;Left  (x2- light manual resistance)   Hip Adduction Sitting;15 reps;Right;Left  (light manual resist)   Knee AROM Long Arc Quad Sitting;15 reps;Right;Left  (x2)   Ankle Pumps Sitting;15 reps;Bilateral   Balance training  sit< stand from chair x 3 reps Assessment   Prognosis Good   Problem List Decreased strength;Decreased endurance; Impaired balance;Decreased mobility; Decreased coordination;Decreased safety awareness; Impaired judgement   Assessment Pt seen for skilled pt session consisting of gait; therex and pt education on safety and o2 cord mgmt  Pt OOB to chair on presentation - on 4Lo2- pt ambulating increased distances - however requires cues for breathing and pacing - Spo2 w/ activity dropping to 84%- slow to recover to 91% (>3min) w/ seated rest  Pt requires therapeutic rest b/t all exercies; transfers and gait trials  Cont to recommend d/c to home w/ continued to 24/7 care of family vs inpt rehab depending on level of assist and supervision family can provide on d/c  Goals   Patient Goals none expressed    STG Expiration Date 09/07/17   Treatment Day 2   Plan   Treatment/Interventions Functional transfer training;LE strengthening/ROM; Therapeutic exercise; Endurance training;Patient/family training;Equipment eval/education; Bed mobility;Gait training;Spoke to nursing;OT   PT Frequency 5x/wk   Recommendation   Recommendation Home PT;24 hour supervision/assist;Home with family support  (if having ongoing 24/7 care- will need rehab otherwise)   Equipment Recommended Walker   PT - OK to Discharge (only to STR at present - unless family can provide yoshi)       Toyin Villalobos, PT

## 2017-08-31 NOTE — PROGRESS NOTES
Pt's amniodorone drip has been running for 12 hours in same IV site  CXR for placement of PICC not yet ready by radiologist  Notified SLIM, tried to get in contact with radiologist unable to obtain results  Notified by SLIM to page cardiology, per cardiology they do no want to d/c amniodorone at this time  Per SLIM at this time we are to flush lines and alternate current drip site of L AC to right wrist  Heparin gtt will be moved to L Ac

## 2017-08-31 NOTE — PLAN OF CARE
Problem: PHYSICAL THERAPY ADULT  Goal: Performs mobility at highest level of function for planned discharge setting  See evaluation for individualized goals  Treatment/Interventions: Functional transfer training, LE strengthening/ROM, Endurance training, Therapeutic exercise, Patient/family training, Equipment eval/education, Bed mobility, Gait training  Equipment Recommended: Tawnya Ramires       See flowsheet documentation for full assessment, interventions and recommendations  Outcome: Progressing  Prognosis: Good  Problem List: Decreased strength, Decreased endurance, Impaired balance, Decreased mobility, Decreased coordination, Decreased safety awareness, Impaired judgement  Assessment: Pt seen for skilled pt session consisting of gait; therex and pt education on safety and o2 cord mgmt  Pt OOB to chair on presentation - on 4Lo2- pt ambulating increased distances - however requires cues for breathing and pacing - Spo2 w/ activity dropping to 84%- slow to recover to 91% (>3min) w/ seated rest  Pt requires therapeutic rest b/t all exercies; transfers and gait trials  Cont to recommend d/c to home w/ continued to 24/7 care of family vs inpt rehab depending on level of assist and supervision family can provide on d/c  Recommendation: Home PT, 24 hour supervision/assist, Home with family support (if having ongoing 24/7 care- will need rehab otherwise)     PT - OK to Discharge:  (only to STR at present - unless family can provide yoshi)    See flowsheet documentation for full assessment

## 2017-09-01 PROBLEM — J96.02 ACUTE RESPIRATORY FAILURE WITH HYPERCAPNIA (HCC): Status: ACTIVE | Noted: 2017-01-01

## 2017-09-01 PROBLEM — I46.9 CARDIAC ARREST (HCC): Status: ACTIVE | Noted: 2017-01-01

## 2017-09-01 NOTE — PROGRESS NOTES
Pronounced by Dr Malia Bynum at 1461 am, no HR, no palpable pulses, no heart sounds upon auscultation  Family present at bedside  [facility] called, patient not a candidate due to her age  's office called and made aware, pt not a 's case  Medical record and nursing supervisor made aware  Family made aware to call the  home

## 2017-09-01 NOTE — DISCHARGE SUMMARY
Discharge Summary - Janette Fernandez 80 y o  female MRN: [id number]    Unit/Bed#: MICU 07 Encounter: [id number]    Admission Date: 2017     Admitting Diagnosis: Retroperitoneal hemorrhage [R58]  Dyspnea [R06 00]  Abdominal pain [R10 9]  Groin pain [R10 30]    HPI: 80 y o  female who presented to the ICU s/p PEA cardiac arrest secondary to hypoxia  The nurse reports patient complained of dyspnea since midnight  Breathing tx was administered with mild relief and sats were maintained in the mid 90s  Bradycardia was noted on telemetry and nurse found the patient slumped over in bed  Patient was noted to not have a pulse  Compressions were started and on initial pulse check patient had junctional electrical activity still with no pulse  ROSC was achieved after 10 minutes and 3 doses of 1mg epi  Patient was intubated by ED resident successfully after the first pulse check  Procedures Performed:   Orders Placed This Encounter   Procedures   [address] ED ECG Documentation Only   [facility] Course: Ms Juni Garibay was initially admitted to the hospital on  after an iatrogenic retroperitoneal hematoma s/p femoral artery access for cardiac cath  She was in hemorrhagic shock with ABLA  Vascular surgery evacuated the retroperitoneal hematoma and repaired the bleeding L fem artery  After the procedure, hemoglobins were stable and patient was transferred out of the ICU  She remained on med surg for multiple medical comorbidities including acute on chronic CHF, severe AS, afib RVR, SSS, CKD, COPD, CAD s/p CABG  Complications: PEA cardiac arrest     Discharge Diagnosis: PEA cardiac arrest 2/2 acute respiratory failure    Condition at Discharge:      Discharge instructions/Information to patient and family:   See after visit summary for information provided to patient and family        Provisions for Follow-Up Care:  See after visit summary for information related to follow-up care and any pertinent home health orders  Disposition: Marleen    Planned Readmission: No    Discharge Statement   I spent 25 minutes discharging the patient  This time was spent on the day of discharge  I had direct contact with the patient on the day of discharge  Additional documentation is required if more than 30 minutes were spent on discharge  Discharge Medications:  See after visit summary for reconciled discharge medications provided to patient and family

## 2017-09-01 NOTE — PROGRESS NOTES
Pt reported shortness of breath o2 sat 90%, O2 increased to 5L from 4L 94% on 5L still complaining of shortness of breath, respiratory paged for nebulizer treatment  Treatment given, pt continues to report chest tightness and shortness of breath  SLIM notified, will order CXR and will come to see the patient, awaiting intervention will continue to monitor

## 2017-09-01 NOTE — RAPID RESPONSE
Progress Note - Rapid Response   Roxi Madrigal 80 y o  female MRN: [id number]    Time Called ( Time): 02:30  Room#: 942  Arrival Time ( Time): 02:33  Event End Time ( Time): 0300  MEWS score at time of Rapid Response:   Primary reason for call: Acute change in HR  Interventions:  Airway/Breathing:  Intubated  Circulation: IV Fluid Bolus and EKG  Other Treatments: ACLS protocol       Assessment:   · Cardiac Arrest  · ACLS protocol followed, 3 rounds of epi, ROSC achieved  · Transfer to ICU  · CBC, CMP, lactate, PT/INR, istat, CXR, EKG       HPI/Chief Complaint (Background/Situation):   Roxi Madrigal is a 80y o  year old female  She was seen at 0230 and found to be short of breath  Her HR dropped and and a rapid response was called  She then lost pulses and CPR was started following ACLS protocol  She was intubated and after 3 cycles of epi, ROSC was achieved  She was initiated on an epi drip, however her BP was 213/100 so the epi drip was stopped  She was transferred to the ICU  Family was notified of the transfer      Historical Information   Past Medical History:   Diagnosis Date    Anemia     Anticoagulated on Coumadin     Mechanical MVR, AFib    Anxiety     Atrial fibrillation     CAD (coronary artery disease)     CHF (congestive heart failure)     COPD (chronic obstructive pulmonary disease) with emphysema     home O2    Depression     Eczema     HOCM (hypertrophic obstructive cardiomyopathy)     s/p Myomectomy 10/2009    Hyperlipidemia     Hypertension     Lung mass     Requires supplemental oxygen     Sick sinus syndrome     Vitamin B12 deficiency      Past Surgical History:   Procedure Laterality Date    CARDIAC CATHETERIZATION  08/21/2017    CARDIAC CATHETERIZATION  2005    CARDIAC CATHETERIZATION      2009    CORONARY ARTERY BYPASS GRAFT  10/2009    CORONARY STENT PLACEMENT  2005    OM    MITRAL VALVE REPLACEMENT  12/2009    Redo sternotomy, Mechanical MVR    MYOMECTOMY  10/2009    HOCM    [place] THROMBOENDARTECTMY FEMORAL COMMON Left 8/25/2017    Procedure: CONTROL OF LEFT FEMORAL ARTERY BLEEDING;  Surgeon: Yossi Tobar MD;  [facility];  Service: Vascular     Social History   History   Alcohol Use No     History   Drug Use No     History   Smoking Status    Former Smoker   Smokeless Tobacco    Not on file     Family History: non-contributory    Meds/Allergies     acetaminophen 650 mg Oral Q6H [address]   amiodarone 100 mg Oral Once per day on Sun Mon Wed Fri   amLODIPine 2 5 mg Oral Daily   aspirin 81 mg Oral Daily   atorvastatin 40 mg Oral Daily   citalopram 20 mg Oral Daily   docusate sodium 100 mg Oral BID   furosemide 20 mg Intravenous Once   furosemide 60 mg Intravenous BID (diuretic)   metoprolol tartrate 12 5 mg Oral Daily   potassium chloride 10 mEq Oral BID   senna 1 tablet Oral HS   sodium chloride 1,000 mL Intravenous Once   warfarin 2 5 mg Oral Once per day on Mon Fri   warfarin 5 mg Oral Once per day on Sun Tue Wed Thu Sat         amiodarone 0 5 mg/min Last Rate: 0 5 mg/min (08/31/17 1501)       No Known Allergies    ROS: Negative except SOB    Physical Exam:  Gen:Unresponsive, pulseless  Intubated with bilateral breath sounds       Intake/Output Summary (Last 24 hours) at 09/01/17 0301  Last data filed at 09/01/17 0202   Gross per 24 hour   Intake            700 4 ml   Output             1225 ml   Net           -524 6 ml       Respiratory    Lab Data (Last 4 hours)    None         O2/Vent Data (Last 4 hours)    None              Invasive Devices     Peripherally Inserted Central Catheter Line            PICC Line 16/07/27 Left Basilic 1 day          Line            Arterial Sheath 00097 days    Arterial Sheath 5 Fr  Left Femoral 10 days          Airway            ETT  Cuffed 8 mm less than 1 day                DIAGNOSTIC DATA:    Lab: I have personally reviewed pertinent lab results     CBC:     Results from last 7 days  Lab Units 08/31/17  0501   WBC Thousand/uL 8 87   HEMOGLOBIN g/dL 8 6*   HEMATOCRIT % 27 6*   PLATELETS Thousands/uL 254     CMP:     Results from last 7 days  Lab Units 08/31/17  0501 08/30/17  0407 08/29/17  0650  08/25/17  0631   SODIUM mmol/L 139 142 143  < > 142   POTASSIUM mmol/L 3 7 4 1 4 0  < > 4 5   CHLORIDE mmol/L 101 106 108  < > 104   CO2 mmol/L 30 29 30  < > 33*   BUN mg/dL 34* 30* 31*  < > 33*   CREATININE mg/dL 1 38* 1 25 1 25  < > 1 59*   CALCIUM mg/dL 8 9 8 8 8 5  < > 8 7   TOTAL PROTEIN g/dL  --   --   --   --  5 9*   BILIRUBIN TOTAL mg/dL  --   --   --   --  0 57   ALK PHOS U/L  --   --   --   --  104   ALT U/L  --   --   --   --  10*   AST U/L  --   --   --   --  19   GLUCOSE RANDOM mg/dL 102 119 111  < > 84   < > = values in this interval not displayed  PT/INR:   Lab Results   Component Value Date    INR 2 92 (H) 08/31/2017   ,   Magnesium: No components found for: MAG,   Phosphorous: No results found for: PHOS    Microbiology:  No results found for: ASIF Garcia      OUTCOME:   Transferred to Critical Care Unit  Family notified of transfer: yes  Family member contacted: Daughter - Zelalem Duran  Code Status: Level 1 - Full Code  Critical Care Time: Total Critical Care time spent 30 minutes excluding procedures, teaching and family updates

## 2017-09-01 NOTE — PROGRESS NOTES
ICU Acceptance Note - Critical Care  Mallorie Stroud 80 y o  female MRN: [id number]  Unit/Bed#: MICU 07 Encounter: [id number]     Reason for Admission / Chief Complaint: PEA Arrest likely 2/2 hypoxia      History of Present Illness:  Mallorie Stroud is a 80 y o  female who presents to the ICU s/p PEA cardiac arrest  The nurse reports patient complained of dyspnea since midnight  Breathing tx was administered with mild relief and sats were maintained in the mid 90s  Bradycardia was noted on telemetry and nurse found the patient slumped over in bed  Patient was noted to not have a pulse  Compressions were started and on initial pulse check patient had junctional electrical activity still with no pulse  ROSC was achieved after 10 minutes and 3 doses of 1mg epi  Patient was intubated by ED resident successfully after the first pulse check  Ms Mari Barton was initially admitted to the hospital on 8/25 after an iatrogenic retroperitoneal hematoma s/p femoral artery access for cardiac cath  She was in hemorrhagic shock with ABLA  Vascular surgery evacuated the retroperitoneal hematoma and repaired the bleeding L fem artery  After the procedure, hemoglobins were stable and patient was transferred out of the ICU  She remained on med surg for multiple medical comorbidities including acute on chronic CHF, severe AS, afib RVR, SSS, CKD, COPD, CAD s/p CABG  History obtained from chart review and nurse       Past Medical History:  Past Medical History:   Diagnosis Date    Anemia     Anticoagulated on Coumadin     Mechanical MVR, AFib    Anxiety     Atrial fibrillation     CAD (coronary artery disease)     CHF (congestive heart failure)     COPD (chronic obstructive pulmonary disease) with emphysema     home O2    Depression     Eczema     HOCM (hypertrophic obstructive cardiomyopathy)     s/p Myomectomy 10/2009    Hyperlipidemia     Hypertension     Lung mass     Requires supplemental oxygen     Sick sinus syndrome  Vitamin B12 deficiency         Past Surgical History:  Past Surgical History:   Procedure Laterality Date    CARDIAC CATHETERIZATION  08/21/2017    CARDIAC CATHETERIZATION  2005    CARDIAC CATHETERIZATION      2009    CORONARY ARTERY BYPASS GRAFT  10/2009    CORONARY STENT PLACEMENT  2005    OM    MITRAL VALVE REPLACEMENT  12/2009    Redo sternotomy, Mechanical MVR    MYOMECTOMY  10/2009    HOCM    [place] THROMBOENDARTECTMY FEMORAL COMMON Left 8/25/2017    Procedure: CONTROL OF LEFT FEMORAL ARTERY BLEEDING;  Surgeon: Mauricio Rivas MD;  [facility];  Service: Vascular        Past Family History:  History reviewed  No pertinent family history  Social History:  History   Smoking Status    Former Smoker   Smokeless Tobacco    Not on file     History   Alcohol Use No     History   Drug Use No     Marital Status:      Medications:  Current Facility-Administered Medications   Medication Dose Route Frequency    chlorhexidine (PERIDEX) 0 12 % oral rinse 15 mL  15 mL Swish & Spit Q12H [address]    fentanyl citrate (PF) 100 MCG/2ML 50 mcg  50 mcg Intravenous Q2H PRN    levalbuterol (XOPENEX) inhalation solution 1 25 mg  1 25 mg Nebulization Q6H PRN    multi-electrolyte (ISOLYTE-S PH 7 4 equivalent) IV solution  50 mL/hr Intravenous Continuous    pantoprazole (PROTONIX) injection 40 mg  40 mg Intravenous Q24H [address]     Home medications:  Prior to Admission medications    Medication Sig Start Date End Date Taking?  Authorizing Provider   amiodarone 100 mg tablet Take 1 tablet by mouth every other day 8/22/17  Yes ABHIJIT Estrada   amLODIPine (NORVASC) 2 5 mg tablet Take 2 5 mg by mouth daily   Yes Historical Provider, MD   aspirin 81 mg chewable tablet Chew 1 tablet daily 8/22/17  Yes ABHIJIT Estrada   atorvastatin (LIPITOR) 40 mg tablet Take 40 mg by mouth daily   Yes Historical Provider, MD   captopril (CAPOTEN) 25 mg tablet Take 25 mg by mouth 3 (three) times a day   Yes Historical Provider, MD   citalopram (CeleXA) 20 mg tablet Take 20 mg by mouth daily   Yes Historical Provider, MD   fluticasone furoate-vilanterol (BREO ELLIPTA) Inhale 1 puff   Yes Historical Provider, MD   furosemide (LASIX) 20 mg tablet Take 60 mg by mouth 2 (two) times a day   Yes Historical Provider, MD   guaiFENesin (MUCINEX) 600 mg 12 hr tablet Take 1,200 mg by mouth daily as needed for cough   Yes Historical Provider, MD   levalbuterol (XOPENEX) 1 25 mg/3 mL nebulizer solution Take 1 ampule by nebulization every 4 (four) hours as needed for wheezing   Yes Historical Provider, MD   loratadine (CLARITIN) 10 mg tablet Take 10 mg by mouth daily   Yes Historical Provider, MD   metoprolol tartrate (LOPRESSOR) 25 mg tablet Take 12 5 mg by mouth daily     Yes Historical Provider, MD   potassium chloride (MICRO-K) 10 MEQ CR capsule Take 10 mEq by mouth daily at bedtime   Yes Historical Provider, MD   Umeclidinium Bromide (INCRUSE ELLIPTA IN) Inhale daily   Yes Historical Provider, MD   warfarin (COUMADIN) 5 mg tablet Take 1 tablet by mouth daily for 30 days 5mg everyday except Monday  2 5mg on Monday  Patient taking differently: Take 5 mg by mouth daily 5mg everyday except Monday and friday  2 5mg on Monday and 17 Yes Angel Simmons PA-C     Allergies:  No Known Allergies     ROS:   Review of Systems   Unable to perform ROS: Intubated   All other systems reviewed and are negative  Vitals:  Vitals:    17 1553 17 2355 17 0300 17 0424   BP: 141/65 (!) 183/70  (!) 136/44   Pulse: 63 63  78   Resp: 20 20     Temp: 97 9 °F (36 6 °C) 98 2 °F (36 8 °C)     TempSrc: Oral Oral     SpO2: 92% 93% 91%    Weight:       Height:         Temperature:   Temp (24hrs), Av 1 °F (36 7 °C), Min:97 9 °F (36 6 °C), Max:98 2 °F (36 8 °C)    Current: Temperature: 98 2 °F (36 8 °C)     Weights:   IBW: 50 1 kg  Body mass index is 25 89 kg/m²       Hemodynamic Monitoring:  N/A     Non-Invasive/Invasive Ventilation Settings:  Respiratory    Lab Data (Last 4 hours)    None         O2/Vent Data (Last 4 hours)      09/01 0300           Vent Mode VC/AC       Resp Rate (Set) 20       Vt (mech) (mL) 450       FiO2 (%) 60       PEEP/CPAP (cm H2O) 5       Total Rate 21       MV (Obs) 10 4                 No results found for: PHART, OMK8SPX, PO2ART, NUR8MBL, K9POFAQU, BEART, SOURCE  SpO2: SpO2: 91 %     Physical Exam:  Physical Exam   Constitutional:   Critically ill, intubated   HENT:   Head: Normocephalic and atraumatic  Eyes: Conjunctivae are normal    Pupils equal and minimally reactive to light   Cardiovascular: Normal rate and intact distal pulses  Murmur heard  Pulmonary/Chest: She has rales  Intubated   Abdominal: Soft  There is no guarding  Musculoskeletal: She exhibits no edema or deformity  Neurological:   GCS 5T [id number]  +corneal, cough  No gag  Nursing note and vitals reviewed  Labs:    Results from last 7 days  Lab Units 09/01/17 0301 09/01/17 0254 08/31/17 0501 08/30/17 0407 08/28/17  0537   WBC Thousand/uL 10 62*  --  8 87 9 45  < > 7 54   HEMOGLOBIN g/dL 9 0*  --  8 6* 9 2*  < > 8 0*   I STAT HEMOGLOBIN g/dl  --  9 2*  --   --   --   --    HEMATOCRIT % 29 3*  --  27 6* 29 0*  < > 24 5*   PLATELETS Thousands/uL 75*  --  254 260  < > 178   NEUTROS PCT %  --   --  78* 83*  --  77*   MONOS PCT %  --   --  11 10  --  9   MONO PCT MAN % 1*  --   --   --   --   --    < > = values in this interval not displayed     Results from last 7 days  Lab Units 09/01/17 0301 09/01/17 0254 08/31/17  0501 08/30/17  0407  08/25/17  0631   SODIUM mmol/L 141  --  139 142  < > 142   POTASSIUM mmol/L 4 9  --  3 7 4 1  < > 4 5   CHLORIDE mmol/L 101  --  101 106  < > 104   CO2 mmol/L 28  --  30 29  < > 33*   BUN mg/dL 37*  --  34* 30*  < > 33*   CREATININE mg/dL 1 77*  --  1 38* 1 25  < > 1 59*   CALCIUM mg/dL 8 6  --  8 9 8 8  < > 8 7   TOTAL PROTEIN g/dL 5 6*  --   --   --   --  5 9*   BILIRUBIN TOTAL mg/dL 0 77  --   --   --   --  0 57   ALK PHOS U/L 121*  --   --   --   --  104   ALT U/L 87*  --   --   --   --  10*   AST U/L 205*  --   --   --   --  19   GLUCOSE RANDOM mg/dL 320*  --  102 119  < > 84   GLUCOSE, ISTAT mg/dl  --  354*  --   --   --   --    < > = values in this interval not displayed  Results from last 7 days  Lab Units 08/27/17  0546 08/26/17  0432   MAGNESIUM mg/dL 2 4 2 3       Results from last 7 days  Lab Units 08/27/17  0546 08/26/17  0432   PHOSPHORUS mg/dL 3 1 3 5        Results from last 7 days  Lab Units 09/01/17  0301 08/31/17  0501 08/30/17  0514 08/30/17  0407   INR  4 30* 2 92* 2 34*  --    PTT seconds  --  113* 66* 171*       Results from last 7 days  Lab Units 08/25/17  1043   LACTIC ACID mmol/L 1 0       0  Lab Value Date/Time   TROPONINI 0 11 (H) 09/01/2017 0301   TROPONINI 0 10 (H) 08/30/2017 0842   TROPONINI 0 10 (H) 08/30/2017 0514   TROPONINI 0 10 (H) 08/30/2017 0220   TROPONINI 0 21 (H) 04/21/2017 0104   TROPONINI 0 23 (H) 04/20/2017 2217   TROPONINI 0 07 (H) 06/21/2015 1038   TROPONINI 0 04 06/21/2015 0242   TROPONINI 0 06 (H) 06/20/2015 1845   TROPONINI 0 09 (H) 06/19/2015 2316   TROPONINI 0 13 (H) 06/19/2015 1320   TROPONINI 0 10 (H) 05/11/2015 0525   TROPONINI 0 22 (H) 05/11/2015 0031   TROPONINI 0 20 (H) 05/10/2015 1705   TROPONINI <0 04 01/03/2015 0015   TROPONINI <0 04 01/02/2015 1546   TROPONINI <0 04 01/02/2015 0820        Imaging: CXR I have personally reviewed pertinent films in PACS  EKG: LBBB, Accelerated idioventricular rhythm  Micro:  No results found for: Wendelin Peaches, SPUTUMCULTUR    Assessment: 80year old female presents s/p PEA arrest         Plan:                  Neuro: GCS 5T for decorticate flexion to pain s/p PEA arrest - Initiate targeted temperature management  Consider head CT if no change in mental status  CV: PEA arrest  Currently HD stable with no pressor requirement  TTM initiated     - Hx of acute on chronic CHF, severe AS, afib RVR, SSS, CKD, COPD, CAD s/p CABG  Hold all home meds  Trop stable 0 11 s/p cardiac arrest  Cardiology c/s     - Hx of L fem artery injury with repair  Hgb stable  Consider CTAP                 Lung: Acute hypoxic resp failure - Intubated AC 20/450/60/5  Wean settings based on pending ABG  - Hx of COPD - cont xopenex PRN  GI: Stress ulcer ppx with protonix  FEN: Isolyte 50, replete lytes PRN, NPO                 : Monitor strict IO with temp christianson in place    - CRISTIANO on CKD creat 1 77 from 1 38  Gentle MF  Recheck labs 0800  ID: Afebrile, Monitor CBC  No indication for abx at this time  Blood cx 24 hr after TTM  Heme: Coumadin coagulopathy with INR 4 3  Hold AC for now  SCDs for DVT ppx  Hgb stable at 9 s/p cardiac arrest  Cont to monitor with hx of retroperitoneal hematoma  Endo: Monitor BS q6h, last 320  SSI PRN  Msk/Skin: Pressure ulcer ppx  Disposition: Cont ICU care     VTE Pharmacologic Prophylaxis: Reason for no pharmacologic prophylaxis elevated INR  VTE Mechanical Prophylaxis: sequential compression device     Invasive lines and devices: Invasive Devices     Peripherally Inserted Central Catheter Line            PICC Line 64/46/31 Left Basilic 1 day          Line            Arterial Sheath 99090 days    Arterial Sheath 5 Fr  Left Femoral 10 days          Airway            ETT  Cuffed 8 mm less than 1 day                 Code Status: Level 1 - Full Code  POA:    POLST:       Given critical illness, patient length of stay will require greater than two midnights  Counseling / Coordination of Care     Portions of the record may have been created with voice recognition software  Occasional wrong word or "sound a like" substitutions may have occurred due to the inherent limitations of voice recognition software    Read the chart carefully and recognize, using context, where substitutions have occurred          Corina Dickinson, DO

## 2017-09-01 NOTE — PROGRESS NOTES
Death Note    I was called to the patient's bedside to pronounce that the patient had   No spontaneous movements were present  There was no response to verbal or tactile stimuli  No pulses present in carotid artery bilaterally  No breath sounds were appreciated over bilateral lung fields  No heart sounds were auscultated across the entire precordium  Patient was pronounced dead at 2017 07:14  Family was present during examination  Attending Dr Dania Vogt notified  Cause of death Acute Respiratory Failure complicated by PEA arrest  Patient was made comfort care and terminally extubated      Oneda Alyssia, DO

## 2017-09-01 NOTE — PROGRESS NOTES
Provider paged by RN at 02:20 RN stated pt had been c/o SOB for past 2 hours and  Was becoming increasingly SOB after respiratory saw and o2 titrated up  RN stated pt showing signs of increased work of breathing  I asked RN to call rapid response if pt was deteriorating/respoiratory distress  RN refused to call a rapid saying it wasn't an emergency she just wanted me to come see the patient  I reviewed chart, noted previous cxr w noted infiltrate and effusion not treated, asked about that, stated I would come see the patient if she was not imminently in distress but would take several minutes while tending to other patients as well Instructed RN If anything deteriorates she should call a rapid  Ordered CXR and labs including troponin and CBC  Went to see patient and a RRT had just turned into code

## 2017-09-01 NOTE — PROCEDURES
Intubation  Date/Time: 9/1/2017 2:55 AM  Performed by: Mary Jane Han  Authorized by: Mary Jane Han     Patient location:  Bedside  Consent:     Consent obtained:  Emergent situation  Pre-procedure details:     Patient status:  Unresponsive    Mallampati score:  3    Pretreatment medications:  None    Paralytics:  None  Indications:     Indications for intubation: airway protection    Procedure details:     Preoxygenation:  Bag valve mask    CPR in progress: yes      Intubation method:  Oral    Oral intubation technique: Bon Vision     Tube size (mm):  8 0    Tube type:  Cuffed    Number of attempts:  1    Cricoid pressure: no      Tube visualized through cords: yes    Placement assessment:     ETT to lip:  23    Tube secured with:  ETT sharma    Breath sounds:  Equal    Placement verification: chest rise, condensation, direct visualization, equal breath sounds and ETCO2 detector    Post-procedure details:     Patient tolerance of procedure:   Tolerated well, no immediate complications

## 2017-09-02 LAB
ATRIAL RATE: 98 BPM
QRS AXIS: 180 DEGREES
QRSD INTERVAL: 174 MS
QT INTERVAL: 408 MS
QTC INTERVAL: 482 MS
T WAVE AXIS: 24 DEGREES
VENTRICULAR RATE: 84 BPM

## 2018-01-10 NOTE — PROGRESS NOTES
REPORT NAME: Progress Notes Report  VISIT DATE: 7/5/2017  VISIT TIME: 3:55 PM EDT  PATIENT NAME: Neo Corral  MEDICAL RECORD NUMBER: [id number]  YOB: 1934  AGE: 80  REFERRING PHYSICIAN: Luis Chavez  SUPERVISING CLINICIAN: Terence Hylton  CARE PROVIDER: [facility]   PATIENT HOME ADDRESS: [address] [place] [address], Parvin Amezcuams, [address] [place] [address]  PATIENT HOME PHONE: [phone]  SOCIAL SECURITY NUMBER:   DIAGNOSIS 1: Unspecified atrial fibrillation / I48 91  DIAGNOSIS 2: Aortic Valve Replacement / 424 1  INR RANGE: 2 5 - 3 5  INR GOAL: 3  TREATMENT START DATE:   TREATMENT END DATE:   NEXT VISIT: 7/19/2017  [facility]  VISIT RESULTS  ENCOUNTER NUMBER:   TEST LOCATION: [facility]  TEST TYPE:   VISIT TYPE:   CURRENT INR: 2 6 PROTIME:   SPECIMEN COL AND RPT DATE: 7/5/2017 3:55 PM EDT  VITAL SIGNS  PULSE:  BP: / WEIGHT:  HEIGHT:  TEMP:   CURRENT ANTICOAGULANT DOSING SCHEDULE  DOSE SIZE: 5mg    ANTICOAGULANT TYPE: COUMADIN  DOSING REGIMEN  Sun       Mon Tues Wed Thurs Fri       Sat  Total/Wk  5         2 5       5         5         5         2 5       5         30  PATIENT MEDICATION INSTRUCTION: Yes  PATIENT NUTRITIONAL COUNSELING: Yes  PATIENT BRUISING INSTRUCTION: Yes  LAST EDUCATION DATE:   PREVIOUS VISIT INFORMATION  VISITDATE  INRGoal INR   Sun    Mon    Tues   Wed    Thurs  Fri    Sat  Total/wk  7/5/2017    3       2 6   5      2 5    5      5      5      2 5    5  30  6/21/2017   3       2 7   5      2 5    5      2 5    5      2 5    2 5  25  6/7/2017    3       5 5   5      2 5    5      HOLD   HOLD   2 5    2 5  17 5  5      2 5    5      2 5    5      2 5    2 5  25  5/17/2017   3       3 2   5      2 5    5      5      5      2 5    5  30  ADDITIONAL PREVIOUS VISIT INFORMATION  VISITDATE   PRIMARY RX               DOSE      CrCl  7/5/2017    COUMADIN                 5mg  6/21/2017   COUMADIN                 5mg  6/7/2017    COUMADIN                 5mg  5/17/2017 COUMADIN                 5mg  OTHER CURRENT MEDICATIONS:  COUMADIN  PROGRESS NOTES: l/m on olivier/no changes/ 2 wks hnl  ---- Additional Notes entered on 7/7/2017 10:26 AM EDT by Brandi Walter  :  daughter in law  marciano was giving her 2 5 mg on mon and fri / not taking  the dose I changed too  They said they dint get the message  Rd  PATIENT INSTRUCTIONS:   TEST LOCATION: [facility], , ,   INBOUND LAB DATA:  Lab       Lab Value Col Date                 Rpt Date                 Lab  Reference Range  Electronically signed by: Brandi Walter  on 7/7/2017 10:26 AM EDT

## 2018-01-11 NOTE — PROCEDURES
Procedures by Julius Sawyer at 9/1/2017  2:55  AM      Author:  Julius Sawyer Service:  Emergency Medicine Author Type:  Resident    Filed:  9/1/2017  2:57 AM Date of Service:  9/1/2017  2:55 AM Status:  Attested    :  Julius Sawyer (Resident)  Cosigner:  Basilia Ashby MD at 9/1/2017  6:25 AM      Procedure Orders:       1  INTUBATION [[id number]] ordered by Julius Sawyer at 09/01/17 0255                 Post-procedure Diagnoses:       1  Hypovolemic shock [R57 1]              Attestation signed by Basilia Ashby MD at 9/1/2017  6:25 AM      I was present for and assisted with this intubation  Pt jett procedure well  Intubation  Date/Time: 9/1/2017 2:55 AM  Performed by: Sarah Cantu  Authorized by: Sarah Cantu     Patient location:  Bedside  Consent:     Consent obtained:  Emergent situation  Pre-procedure details:     Patient status:  Unresponsive    Mallampati score:  3    Pretreatment medications:  None    Paralytics:  None  Indications:     Indications for intubation: airway protection    Procedure details:     Preoxygenation:  Bag valve mask    CPR in progress: yes      Intubation method:  Oral    Oral intubation technique: Bon Vision     Tube size (mm):  8 0    Tube type:  Cuffed    Number of attempts:  1    Cricoid pressure: no      Tube visualized through cords: yes    Placement assessment:     ETT to lip:  23    Tube secured with:  ETT sharma    Breath sounds:  Equal    Placement verification: chest rise, condensation, direct visualization, equal breath sounds and ETCO2 detector    Post-procedure details:     Patient tolerance of procedure:   Tolerated well, no immediate complications                     Received for:Provider  [facility]   Sep  1 2017  6:25AM [facility] Standard Time

## 2018-01-11 NOTE — PROGRESS NOTES
REPORT NAME: Patient Visit Summary Report   VISIT DATE: 2/1/2017  VISIT TIME: 2:18 PM EST  PATIENT NAME: Abby Winchester  MEDICAL RECORD NUMBER: [id number]  SOCIAL SECURITY NUMBER:   YOB: 1934  AGE: 80  REFERRING PHYSICIAN: Marcie Lai  SUPERVISING CLINICIAN: Terence [facility] CARE PROFESSIONAL: [address]   PATIENT HOME ADDRESS: [address] [place] [address], [address] [place] [address]  PATIENT HOME PHONE: [phone]  DIAGNOSIS 1: Unspecified atrial fibrillation / I48 91  DIAGNOSIS 2: Aortic Valve Replacement / 424 1  DIAGNOSIS 3:   DIAGNOSIS 4:   INR RANGE: 2 5 - 3 5  INR GOAL: 3  TREATMENT START DATE:   TREATMENT END DATE:   NEXT VISIT: 2/15/2017  [facility]    VISIT RESULTS   ENCOUNTER NUMBER:   TEST LOCATION: [facility]  TEST TYPE:   VISIT TYPE:   CURRENT INR: 3 5 PROTIME:   SPECIMEN COL AND RPT DATE: 2/1/2017 2:18 PM EST    VITAL SIGNS  PULSE:  B/P:  WEIGHT:  HEIGHT:  TEMP:     CURRENT ANTICOAGULANT DOSING SCHEDULE  DOSE SIZE: 5mg    ANTICOAGULANT TYPE: COUMADIN  DOSING REGIMEN  Sun       Mon Tues Wed Thurs Fri       Sat  Total/Wk  5         2 5       5         5         5         5         5         32 5    PATIENT MEDICATION INSTRUCTION: Yes  PATIENT NUTRITIONAL COUNSELING: Yes  PATIENT BRUISING INSTRUCTION: Yes      LAST EDUCATION DATE:       PREVIOUS VISIT INFORMATION  VISITDATE   INR Goal  INR   Sun     Mon Tues Wed Thurs Fri  Sat     Total/wk  2/1/2017    3         3 5   5       2 5     5       5       5       5  5       32 5  1/18/2017   3         2 5   5       2 5     5       2 5     5       2 5  5       27 5  1/4/2017    3         3 3   5       2 5     5       2 5     5       2 5  5       27 5  12/21/2016  3         3 1   5       2 5     5       2 5     5       2 5  5       27 5    ADDITIONAL PREVIOUS VISIT INFORMATION  VISITDATE   PRIMARY RX               DOSE      CrCl  2/1/2017    COUMADIN                 5mg                 1/18/2017 COUMADIN                 5mg                 1/4/2017    COUMADIN                 5mg                 12/21/2016  COUMADIN                 5mg                     OTHER CURRENT MEDICATIONS: COUMADIN      PROGRESS NOTES: l/m on cell / no changes / 2 wks hnl  ---- Additional Notes entered on 2/2/2017 8:37 AM EST by [address]  :  spoke to olivier her sister in law has been giving mom 5 mg daily   gave  change    PATIENT INSTRUCTIONS:     TEST LOCATION: [facility]    Electronically signed Jed Deras  on 2/2/2017 at 8:37 AM EST

## 2018-01-12 VITALS
HEART RATE: 68 BPM | BODY MASS INDEX: 26.56 KG/M2 | TEMPERATURE: 97.6 F | WEIGHT: 144.31 LBS | DIASTOLIC BLOOD PRESSURE: 72 MMHG | HEIGHT: 62 IN | RESPIRATION RATE: 16 BRPM | SYSTOLIC BLOOD PRESSURE: 126 MMHG

## 2018-01-12 NOTE — PROGRESS NOTES
Chief Complaint  Pt  here for EKG per Dr Nasima Huntley order  BP-110/66 p-60 Pt 's medications reviewed with pt and daughter  Pt  has no complaints  EKG reviewed by Dr Yves Montoya, looks ok-no change in meds  F/u as scheduled  Active Problems    1  3-vessel coronary artery disease (414 00) (I25 10)   2  Abnormal CT scan of lung (793 19) (R91 8)   3  Anemia (285 9) (D64 9)   4  Atrial fibrillation (427 31) (I48 91)   5  Avitaminosis D (268 9) (E55 9)   6  Chronic obstructive pulmonary disease (496) (J44 9)   7  Congestive heart failure (428 0) (I50 9)   8  Depression with anxiety (300 4) (F41 8)   9  Eczema (692 9) (L30 9)   10  Hyperlipidemia (272 4) (E78 5)   11  Hypertension (401 9) (I10)   12  Impacted cerumen of right ear (380 4) (H61 21)   13  Lung mass (786 6) (R91 8)   14  Sick sinus syndrome (427 81) (I49 5)   15  Upper respiratory disease (478 9) (J39 9)   16  Valvular heart disease (424 90) (I38)   17  Vitamin B12 deficiency (266 2) (E53 8)    Current Meds   1  Amiodarone HCl - 200 MG Oral Tablet; TAKE 1 TABLET Twice daily x1 week, then take 1   tablet daily  Requested for: 18Apr2017; Last Rx:18Apr2017 Ordered   2  Atorvastatin Calcium 40 MG Oral Tablet; Take 1 tablet daily; Therapy: 68Mbb0407 to (Evaluate:60Cav0609)  Requested for: 93Gpf1168; Last   Rx:27Jul2016 Ordered   3  Benzonatate 100 MG Oral Capsule; TAKE ONE CAPSULE BY MOUTH 3 TIMES A DAY   AS NEEDED FOR COUGH; Therapy: 17KGW1899 to (Last LQ:24TUA5969)  Requested for: 23Jan2017 Ordered   4  Breo Ellipta 100-25 MCG/INH Inhalation Aerosol Powder Breath Activated; USE 1   INHALATION ONCE DAILY; Therapy: 26JFY7258 to (Last Rx:30Jan2017)  Requested for: 30Jan2017 Ordered   5  Captopril 25 MG Oral Tablet; TAKE 1 TABLET 3 TIMES DAILY  Requested for:   37HWY2565; Last Rx:10Mar2017 Ordered   6  Citalopram Hydrobromide 20 MG Oral Tablet; take 1 tablet by mouth every day;    Therapy: 87HPT3852 to (Evaluate:83Ujs9120)  Requested for: 78AOF6405; Last   Rx:26Mar2017 Ordered   7  Claritin 10 MG Oral Capsule; Take one tablet daily for her nasal congestion; Therapy: (976 6795) to Recorded   8  Cyanocobalamin 1000 MCG/ML Injection Solution; INJECT 1 ML IN THE MUSCLE   EVERY WEEK X 4 WEEKS THEN 1 ML IN THE MUSCLE EVERY 4   WEEKS; Therapy: 70Swg7815 to (Evaluate:99Czt9468)  Requested for: 20Mld1006; Last   Rx:24Apr2016 Ordered   9  Ferrex 150 Forte Plus  MG Oral Capsule; TAKE 1 CAPSULE BY MOUTH ONCE A   DAY; Therapy: 00GED7687 to (Sy Dooleysins)  Requested for: 75TBQ5371; Last   Rx:09Nov2016 Ordered   10  Furosemide 20 MG Oral Tablet; TAKE 1 TABLET TWICE DAILY; Therapy: 14Pwk0067 to (Evaluate:25Jan2018)  Requested for: 46ITP2628; Last    Rx:30Jan2017 Ordered   11  Furosemide 40 MG Oral Tablet; take 1 tablet by mouth twice daily; Therapy: 79CPX7650 to (Verónica Babb)  Requested for: 38Wlc1342; Last    Rx:01Aug2016 Ordered   12  Incruse Ellipta 62 5 MCG/INH Inhalation Aerosol Powder Breath Activated; INHALE 1    PUFF DAILY; Therapy: 30ZMK2540 to (OIJQWSSX:90BRI2205)  Requested for: 43SSR2105; Last    Rx:30Jan2017 Ordered   13  Levalbuterol HCl - 1 25 MG/0 5ML Inhalation Nebulization Solution; USE 1 VIAL Every 4    hours PRN cough/wheeze; Therapy: 74AYO7111 to (Last Rx:90Utx5449)  Requested for: 72Eqq4977 Ordered   14  Levalbuterol HCl - 1 25 MG/3ML Inhalation Nebulization Solution; use 1 vial via    nebulizer every 4 hours as needed for cough and wheezing; Therapy: 97CBG6648 to (Evaluate:38Tms1049)  Requested for: 44KTA4552; Last    Rx:04Apr2017 Ordered   15  Metoprolol Tartrate 25 MG Oral Tablet; take 1/2 tablet by mouth twice daily; Therapy: 35Vgb4278 to (Evaluate:42Fvj0376)  Requested for: 08Kvu1567; Last    Rx:18Apr2017 Ordered   16  Mucinex 600 MG Oral Tablet Extended Release 12 Hour; TAKE 1 TABLET TWICE DAILY    AS NEEDED; Therapy: (Recorded:00Xrd3936) to Recorded   17   Potassium Chloride ER 10 MEQ Oral Tablet Extended Release; take 1 tablet by mouth    daily; Therapy: 94UXL7876 to (Evaluate:01Apr2017)  Requested for: 73Wsm6606; Last    Rx:87Wzw9117 Ordered   18  Warfarin Sodium 2 5 MG Oral Tablet; 2 5 mg M-W-Fr-Sun     5 mg rest of  the week; Therapy: 65ZEH7534 to (Last Rx:01Dzu5809) Ordered   19  Warfarin Sodium 5 MG Oral Tablet; TAKE 1/2 TO 1 TABLET BY MOUTH DAILY AS    DIRECTED; Therapy: 95MFG5250 to (Evaluate:16Mar2018)  Requested for: 21Mar2017; Last    Rx:21Mar2017 Ordered   20  Xopenex HFA 45 MCG/ACT Inhalation Aerosol; INHALE 2 PUFFS EVERY 4-6 HOURS    AS NEEDED FOR COUGH/ WHEEZE;    Therapy: 01BOY0302 to (Evaluate:13Nov2014)  Requested for: 14Flj2251; Last    Rx:18Afr3330 Ordered    Allergies    1  No Known Drug Allergies    Vitals  Signs    Heart Rate: 60, Apical  Systolic: 371, RUE, Sitting  Diastolic: 66, RUE, Sitting  Height: 5 ft 2 in    Plan  Atrial fibrillation    · EKG/ECG- POC; Status:Complete;   Done: 09QSY5706    Future Appointments    Date/Time Provider Specialty Site   05/08/2017 02:00 PM EL Hayes  [place] [address]   05/18/2017 03:00 PM EL Haney   Cardiology [facility]     Signatures   Electronically signed by : Chasity Samayoa, ; May  2 2017  4:37PM EST                       (Author)    Electronically signed by : EL Rees ; May  3 2017  7:42AM EST                       (Author)

## 2018-01-13 VITALS
TEMPERATURE: 98.1 F | OXYGEN SATURATION: 96 % | HEART RATE: 60 BPM | WEIGHT: 144.31 LBS | BODY MASS INDEX: 26.56 KG/M2 | HEIGHT: 62 IN | DIASTOLIC BLOOD PRESSURE: 62 MMHG | SYSTOLIC BLOOD PRESSURE: 130 MMHG

## 2018-01-13 VITALS
HEIGHT: 62 IN | HEART RATE: 58 BPM | BODY MASS INDEX: 26.56 KG/M2 | WEIGHT: 144.31 LBS | SYSTOLIC BLOOD PRESSURE: 126 MMHG | RESPIRATION RATE: 18 BRPM | DIASTOLIC BLOOD PRESSURE: 64 MMHG | TEMPERATURE: 97.8 F

## 2018-01-13 VITALS — SYSTOLIC BLOOD PRESSURE: 110 MMHG | HEIGHT: 62 IN | DIASTOLIC BLOOD PRESSURE: 62 MMHG | HEART RATE: 54 BPM

## 2018-01-14 VITALS — DIASTOLIC BLOOD PRESSURE: 66 MMHG | HEIGHT: 62 IN | SYSTOLIC BLOOD PRESSURE: 110 MMHG | HEART RATE: 60 BPM

## 2018-01-14 VITALS
SYSTOLIC BLOOD PRESSURE: 140 MMHG | DIASTOLIC BLOOD PRESSURE: 52 MMHG | WEIGHT: 136 LBS | HEART RATE: 49 BPM | BODY MASS INDEX: 25.03 KG/M2 | HEIGHT: 62 IN

## 2018-01-14 VITALS
DIASTOLIC BLOOD PRESSURE: 66 MMHG | WEIGHT: 143 LBS | BODY MASS INDEX: 26.31 KG/M2 | SYSTOLIC BLOOD PRESSURE: 106 MMHG | HEIGHT: 62 IN | HEART RATE: 118 BPM

## 2018-01-14 NOTE — MISCELLANEOUS
Assessment    1  Chronic obstructive pulmonary disease (496) (J44 9)   2  3-vessel coronary artery disease (414 00) (I25 10)   3  Anemia (285 9) (D64 9)   4  Atrial fibrillation (427 31) (I48 91)   5  Congestive heart failure (428 0) (I50 9)   6  Vitamin D deficiency (268 9) (E55 9)    Plan  3-vessel coronary artery disease, Anemia    · (1) CBC/ PLT (NO DIFF); Status:Active; Requested for:02Aug2017;    Perform:[facility]; LGY:[id number]; Ordered; For:3-vessel coronary artery disease, Anemia; Ordered By:Stephy Valverde;   · (1) COMPREHENSIVE METABOLIC PANEL; Status:Active; Requested for:02Aug2017;    Perform:[facility]; SUC:[id number]; Ordered; For:3-vessel coronary artery disease, Anemia; Ordered By:Jeronimo Valverde;   · (1) IRON; Status:Active; Requested for:02Aug2017;    Perform:[facility]; TZR:[id number]; Ordered; For:3-vessel coronary artery disease, Anemia; Ordered By:Stephy Valverde;   · (1) LIPID PANEL FASTING W DIRECT LDL REFLEX; Status:Active; Requested  for:02Aug2017;    Perform:[facility]; UTF:[id number]; Ordered; For:3-vessel coronary artery disease, Anemia; Ordered By:Stephy Valverde;   · (1) TSH WITH FT4 REFLEX; Status:Active; Requested for:02Aug2017;    Perform:[facility]; SPD:[id number]; Ordered; For:3-vessel coronary artery disease, Anemia; Ordered By:Jeronimo Valverde; Discussion/Summary  Discussion Summary:   Patient presents for follow-up after recent hospitalization for evaluation of a chill fibrillation and non-ST elevation myocardial infarction  History of congestive heart failure, coronary artery disease, status post mitral valve replacement, moderate aortic stenosis  She remains under care of [facility] cardiology and offers no complaints today  She remains under care of [facility] VNA and physical and occupational therapy  COPD  She uses oxygen as directed   Patient is compliant with daily inhalers and nebulizer  She denies any unusual dyspnea, leg edema or fatigue  Reportedly patient has been chest pain-free since her recent hospitalization  Routine blood work performed in April, normal LFTs, stable GFR, stable mild anemia with hemoglobin of 11 4  I advised patient and family to continue same medications  Routine blood work ordered for early August     Counseling Documentation With Imm: The patient was counseled regarding instructions for management, impressions  Medication SE Review and Pt Understands Tx: Possible side effects of new medications were reviewed with the patient/guardian today  The treatment plan was reviewed with the patient/guardian  The patient/guardian understands and agrees with the treatment plan      Chief Complaint  Chief Complaint Free Text Note Form: BRITTA: Pt was admitted to [facility] from 04/20/2017 through 04/21/2017  Dx: Chest pain, Paroxysmal A-Fib, Non -St Elevation Myocardial Infarction-type 2  Spoke with pt's QUIN lofton , who reports pt is doing pretty good and was D/C with home health services  Follow up with pcp scheduled for 05/02/2017 @ 10:30am       History of Present Illness  TCM Communication [facility]: The patient is being contacted for follow-up after hospitalization and 04/25/2017  She was hospitalized at [facility]  The date of admission: 04/20/2017, date of discharge: 04/21/2017  Diagnosis: Chest pain, PAF, NSTEMI  She was discharged to home, with home health services  Medications were not reviewed today  She scheduled a follow up appointment  The patient is currently asymptomatic  Counseling was provided to patient's family  Topics counseled included importance of compliance with treatment  Communication performed and completed by [employer]       HPI: Patient presents for follow-up after recent hospitalization  Paroxysmal atrial fibrillation, chest pain, symptoms have resolved  Patient remains under care of cardiology  She is on oxygen 24 7  Patient is in a wheelchair, she is accompanied by her son and daughter-in-law  VNA and PT at home  OT at home   Patient was discharged home on the same daily medications  She remains on iron supplements daily as directed  She denies chest pain, palpitations, shortness of breath, leg edema or dizziness  She uses nebulizer 3-4 times a day  She is compliant with daily inhalers  Cardiac consult during recent hospitalization reviewed  Patient presented with chest discomfort to cardiology office  She was subsequently referred to emergency room  Dose of amiodarone was increased temporarily  No changes in diuretics  Long acting nitrates added  Patient remains on lifelong Coumadin due to mitral valve replacement, cardiology follows  Known moderate aortic stenosis  Inpatient labs on April 21 hemoglobin of 11 1, normal white blood cell count and platelet count, creatinine 1 47 glucose 85  Chest x-ray while inpatient and showed small bilateral pleural effusions she appeared euvolemic during cardiology assessment      Review of Systems  Complete-Female:   Constitutional: feeling tired  Eyes: No complaints of eye pain, no red eyes, no eyesight problems, no discharge, no dry eyes, no itching of eyes  ENT: no complaints of earache, no loss of hearing, no nose bleeds, no nasal discharge, no sore throat, no hoarseness  Cardiovascular: as noted in HPI  Respiratory: shortness of breath and shortness of breath during exertion  Gastrointestinal: No complaints of abdominal pain, no constipation, no nausea or vomiting, no diarrhea, no bloody stools  Genitourinary: No complaints of dysuria, no incontinence, no pelvic pain, no dysmenorrhea, no vaginal discharge or bleeding  Musculoskeletal: arthralgias  Integumentary: No complaints of skin rash or lesions, no itching, no skin wounds, no breast pain or lump     Neurological: No complaints of headache, no confusion, no convulsions, no numbness, no dizziness or fainting, no tingling, no limb weakness, no difficulty walking  Psychiatric: Not suicidal, no sleep disturbance, no anxiety or depression, no change in personality, no emotional problems  Endocrine: No complaints of proptosis, no hot flashes, no muscle weakness, no deepening of the voice, no feelings of weakness  Hematologic/Lymphatic: No complaints of swollen glands, no swollen glands in the neck, does not bleed easily, does not bruise easily  Active Problems    1  3-vessel coronary artery disease (414 00) (I25 10)   2  Abnormal CT scan of lung (793 19) (R91 8)   3  Anemia (285 9) (D64 9)   4  Atrial fibrillation (427 31) (I48 91)   5  Chronic obstructive pulmonary disease (496) (J44 9)   6  Congestive heart failure (428 0) (I50 9)   7  Depression with anxiety (300 4) (F41 8)   8  Eczema (692 9) (L30 9)   9  Hyperlipidemia (272 4) (E78 5)   10  Hypertension (401 9) (I10)   11  Impacted cerumen of right ear (380 4) (H61 21)   12  Lung mass (786 6) (R91 8)   13  Sick sinus syndrome (427 81) (I49 5)   14  Upper respiratory disease (478 9) (J39 9)   15  Valvular heart disease (424 90) (I38)   16  Vitamin B12 deficiency (266 2) (E53 8)   17  Vitamin D deficiency (268 9) (E55 9)    Past Medical History    1  History of upper respiratory infection (V12 09) (Z87 09)   2  History of urinary tract infection (V13 02) (Z87 440)   3  History of Shortness of breath (786 05) (R06 02)    Surgical History    1  History of CABG  Surgical History Reviewed: The surgical history was reviewed and updated today  Family History  Mother    1  Family history of coronary artery disease (V17 3) (Z82 49)  Father    2  Family history of myocardial infarction (V17 3) (Z82 49)  Family History Reviewed: The family history was reviewed and updated today  Social History    · History of Former smoker (Y50 83) (Y32 002)   · Former smoker (Z05 28) (F90 342)  Social History Reviewed:  The social history was reviewed and updated today       Current Meds   1  Amiodarone HCl - 200 MG Oral Tablet; TAKE 1 TABLET Twice daily x1 week, then take 1   tablet daily  Requested for: 17Hne4153; Last Rx:18Apr2017 Ordered   2  Atorvastatin Calcium 40 MG Oral Tablet; Take 1 tablet daily; Therapy: 23Shg7152 to (Evaluate:90Smy9431)  Requested for: 76Njk3626; Last   Rx:71Gdp5483 Ordered   3  Benzonatate 100 MG Oral Capsule; TAKE ONE CAPSULE BY MOUTH 3 TIMES A DAY AS   NEEDED FOR COUGH; Therapy: 81NFJ0511 to (Last VD:26ZET0614)  Requested for: 23Jan2017 Ordered   4  Breo Ellipta 100-25 MCG/INH Inhalation Aerosol Powder Breath Activated; USE 1   INHALATION ONCE DAILY; Therapy: 72SGR5266 to (Last Rx:30Jan2017)  Requested for: 30Jan2017 Ordered   5  Captopril 25 MG Oral Tablet; TAKE 1 TABLET 3 TIMES DAILY  Requested for: 76HWP6380;   Last Rx:10Mar2017 Ordered   6  Citalopram Hydrobromide 20 MG Oral Tablet; take 1 tablet by mouth every day; Therapy: 88JIN1517 to (Evaluate:22Mvs5038)  Requested for: 27RUA1777; Last   Rx:26Mar2017 Ordered   7  Claritin 10 MG Oral Capsule; Take one tablet daily for her nasal congestion; Therapy: [phone]) to Recorded   8  Cyanocobalamin 1000 MCG/ML Injection Solution; INJECT 1 ML IN THE MUSCLE   EVERY WEEK X 4 WEEKS THEN 1 ML IN THE MUSCLE EVERY 4   WEEKS; Therapy: 72Jby8969 to (Evaluate:75Uwk7429)  Requested for: 24Apr2016; Last   Rx:24Apr2016 Ordered   9  Ferrex 150 Forte Plus  MG Oral Capsule; TAKE 1 CAPSULE BY MOUTH ONCE A   DAY; Therapy: 76XCB7173 to (Joaquín Carmona)  Requested for: 00GKL3591; Last   Rx:09Nov2016 Ordered   10  Furosemide 20 MG Oral Tablet; TAKE 1 TABLET TWICE DAILY; Therapy: 01Rui0849 to (Evaluate:25Jan2018)  Requested for: 97ZAO5943; Last    Rx:30Jan2017 Ordered   11  Furosemide 40 MG Oral Tablet; take 1 tablet by mouth twice daily; Therapy: 93FGR9707 to (Amanda Cordoba)  Requested for: 65Tra6768; Last    Rx:77Rot2944 Ordered   12   Incruse Ellipta 62 5 MCG/INH Inhalation Aerosol Powder Breath Activated; INHALE 1    PUFF DAILY; Therapy: 20AXP7385 to (CBIDCMKA:61DZP0390)  Requested for: 70PGQ6935; Last    Rx:30Jan2017 Ordered   13  Levalbuterol HCl - 1 25 MG/0 5ML Inhalation Nebulization Solution; USE 1 VIAL Every 4    hours PRN cough/wheeze; Therapy: 82BMB0237 to (Last Rx:52Bhw3797)  Requested for: 24Ham8796 Ordered   14  Levalbuterol HCl - 1 25 MG/3ML Inhalation Nebulization Solution; use 1 vial via nebulizer    every 4 hours as needed for cough and wheezing; Therapy: 43GBO5306 to (Evaluate:94Lyj1209)  Requested for: 58EWQ4362; Last    Rx:61Heu3692 Ordered   15  Metoprolol Tartrate 25 MG Oral Tablet; take 1/2 tablet by mouth twice daily; Therapy: 71Zkb9438 to (Evaluate:52Qfw4399)  Requested for: 85Plf0879; Last    Rx:60Sil7989 Ordered   16  Mucinex 600 MG Oral Tablet Extended Release 12 Hour; TAKE 1 TABLET TWICE DAILY    AS NEEDED; Therapy: (Recorded:60Qsz4883) to Recorded   17  Potassium Chloride ER 10 MEQ Oral Tablet Extended Release; take 1 tablet by mouth    daily; Therapy: 38HOZ6782 to (Evaluate:68Vtv9369)  Requested for: 66Vut4613; Last    Rx:45Gnv4458 Ordered   18  Warfarin Sodium 2 5 MG Oral Tablet; 2 5 mg M-W-Fr-Sun     5 mg rest of  the week; Therapy: 34AWE3558 to (Last Rx:51Lkm5678) Ordered   19  Warfarin Sodium 5 MG Oral Tablet; TAKE 1/2 TO 1 TABLET BY MOUTH DAILY AS    DIRECTED; Therapy: 00HUV6902 to (Evaluate:67Zmi1050)  Requested for: 21Mar2017; Last    Rx:21Mar2017 Ordered   20  Xopenex HFA 45 MCG/ACT Inhalation Aerosol; INHALE 2 PUFFS EVERY 4-6 HOURS AS    NEEDED FOR COUGH/ WHEEZE;    Therapy: 79HFY4712 to (Evaluate:13Nov2014)  Requested for: 97Efq2688; Last    Rx:71Scj6839 Ordered  Medication List Reviewed: The medication list was reviewed and updated today  Allergies    1   No Known Drug Allergies    Vitals  Signs   Recorded: 04WCG4582 02:03PM   Temperature: 98 7 F  Heart Rate: 56  Systolic: 542  Diastolic: 60  Height: 5 ft 2 in  Weight: 142 lb   BMI Calculated: 25 97  BSA Calculated: 1 65  O2 Saturation: 94    Physical Exam    Constitutional   General appearance: No acute distress, well appearing and well nourished  well developed and chronically ill  Pulmonary   Respiratory effort: No increased work of breathing or signs of respiratory distress  Auscultation of lungs: Clear to auscultation  Cardiovascular   Auscultation of heart: Abnormal   The heart rate was normal  The rhythm was regular  Heart sounds: normal S1 and normal S2  A grade 3 continuous murmur was heard at the LLSB  A grade 2 continous murmur was heard at the RUSB  A grade 3 continous murmur was heard at the LUSB  Examination of extremities for edema and/or varicosities: Normal     Carotid pulses: Normal     Abdomen   Abdomen: Non-tender, no masses  No abdominal bruit  Musculoskeletal   Gait and station: Abnormal   In a wheelchair  Neurologic   Cranial nerves: Cranial nerves 2-12 intact  Psychiatric   Orientation to person, place, and time: Normal     Mood and affect: Normal          Future Appointments    Date/Time Provider Specialty Site   05/18/2017 03:00 PM EL Dukes  Cardiology [facility]     Signatures   Electronically signed by :  EL Lester ; May 11 2017  8:39PM EST                       (Author)

## 2018-01-15 VITALS
TEMPERATURE: 98.5 F | SYSTOLIC BLOOD PRESSURE: 122 MMHG | OXYGEN SATURATION: 98 % | WEIGHT: 136 LBS | BODY MASS INDEX: 25.03 KG/M2 | HEIGHT: 62 IN | HEART RATE: 46 BPM | DIASTOLIC BLOOD PRESSURE: 40 MMHG

## 2018-01-17 NOTE — MISCELLANEOUS
History of Present Illness  A call was received from the [facility] Nurse  The contact name and phone number is  [facility] HF nurse Jeff Beverly  no worsening symptoms, but weight gain has precipitated acute HF in past   Concerns expressed today consisted of: weight is up 4 5 pounds in 3 days  HFCC Additional Notes: Discussed with Natividad Jensen HF NP and will increase lasix to 80 mg bid x 2 days, then resume 60 mg bid maintenance dosing        Signatures   Electronically signed by : Mar Schaeffer, ; Aug 11 2016  1:23PM EST                       (Author)

## 2018-01-17 NOTE — PROGRESS NOTES
Chief Complaint  Pt in office today for EKG  No complaints  See up-dated meds and vitals in allscripts  Dr Sebastián Gonsalez to view ekg  Active Problems    1  3-vessel coronary artery disease (414 00) (I25 10)   2  Abnormal CT scan of lung (793 19) (R91 8)   3  Anemia (285 9) (D64 9)   4  Atrial fibrillation (427 31) (I48 91)   5  Chronic obstructive pulmonary disease (496) (J44 9)   6  Congestive heart failure (428 0) (I50 9)   7  Depression with anxiety (300 4) (F41 8)   8  Eczema (692 9) (L30 9)   9  Hyperlipidemia (272 4) (E78 5)   10  Hypertension (401 9) (I10)   11  Impacted cerumen of right ear (380 4) (H61 21)   12  Lung mass (786 6) (R91 8)   13  Sick sinus syndrome (427 81) (I49 5)   14  Upper respiratory disease (478 9) (J39 9)   15  Valvular heart disease (424 90) (I38)   16  Vitamin B12 deficiency (266 2) (E53 8)   17  Vitamin D deficiency (268 9) (E55 9)    Current Meds   1  Amiodarone HCl - 100 MG Oral Tablet; TAKE 1 TABLET DAILY; Therapy: (Recorded:07Jun2017) to  Requested for: 60IFB1507 Recorded   2  AmLODIPine Besylate 2 5 MG Oral Tablet; take 1 tablet by mouth every day; Therapy: 31TQI7575 to (Evaluate:75Qll1218)  Requested for: 68KWV2806; Last   Rx:07Uww9809 Ordered   3  Atorvastatin Calcium 40 MG Oral Tablet; Take 1 tablet daily; Therapy: 44Fue8663 to (Evaluate:77Njk3600)  Requested for: 52Tob7406; Last   Rx:32Bdr6979 Ordered   4  Captopril 25 MG Oral Tablet; TAKE 1 TABLET 3 TIMES DAILY  Requested for:   12WBH1045; Last Rx:10Mar2017 Ordered   5  Citalopram Hydrobromide 20 MG Oral Tablet; take 1 tablet by mouth every day; Therapy: 39BUM1360 to (Evaluate:21Nku4043)  Requested for: 19CCA4717; Last   Rx:26Mar2017 Ordered   6  Claritin 10 MG Oral Capsule; Take one tablet daily for her nasal congestion; Therapy: (3539 272 85 17) to Recorded   7   Cyanocobalamin 1000 MCG/ML Injection Solution; INJECT 1 ML IN THE MUSCLE   EVERY WEEK X 4 WEEKS THEN 1 ML IN THE MUSCLE EVERY 4   WEEKS; Therapy: 90Nfd4727 to (Evaluate:40Ssl2708)  Requested for: 77Qab2286; Last   Rx:44Pzp7714 Ordered   8  Ferrex 150 Forte Plus  MG Oral Capsule; TAKE 1 CAPSULE BY MOUTH ONCE A   DAY; Therapy: 05UKH1874 to (Shinemagalie Spindle)  Requested for: 44URT4886; Last   Rx:69Yvv0470 Ordered   9  Furosemide 40 MG Oral Tablet; Tane 1 5 tablet bid; Therapy: (Recorded:25Cez5168) to Recorded   10  Incruse Ellipta 62 5 MCG/INH Inhalation Aerosol Powder Breath Activated; INHALE 1    PUFF DAILY; Therapy: 81MHL0575 to (Evaluate:28Fot4803)  Requested for: 31SHJ8602; Last    Rx:43Mbx6005 Ordered   11  Levalbuterol HCl - 1 25 MG/0 5ML Inhalation Nebulization Solution; USE 1 VIAL Every 4    hours PRN cough/wheeze; Therapy: 59HRL0182 to (Last Rx:72Lrx8305)  Requested for: 50Nkm3215 Ordered   12  Levalbuterol HCl - 1 25 MG/3ML Inhalation Nebulization Solution; use 1 vial via    nebulizer every 4 hours as needed for cough and wheezing; Therapy: 69SOJ7614 to (Evaluate:84Cuu6962)  Requested for: 04RVV5363; Last    Rx:04Apr2017 Ordered   13  Metoprolol Tartrate 25 MG Oral Tablet; TAKE 1/2 TABLET BY MOUTH BID; Therapy: 63Gqi3110 to  Requested for: 55QUA1236 Recorded   14  Mucinex 600 MG Oral Tablet Extended Release 12 Hour; TAKE 1 TABLET TWICE DAILY    AS NEEDED; Therapy: (Recorded:71Ppm0852) to Recorded   15  Potassium Chloride ER 10 MEQ Oral Tablet Extended Release; take 1 tablet by mouth    daily; Therapy: 88USX6882 to (Evaluate:70Hin8003)  Requested for: 15Hht5982; Last    Rx:53Lfk6251 Ordered   16  Warfarin Sodium 2 5 MG Oral Tablet; 2 5 mg M-W-Fr-Sun     5 mg rest of  the week; Therapy: 91SBG9257 to (Last Rx:96Vfk1015) Ordered   17  Warfarin Sodium 5 MG Oral Tablet; TAKE 1/2 TO 1 TABLET BY MOUTH DAILY AS    DIRECTED; Therapy: 96PFL8914 to (Evaluate:16Mar2018)  Requested for: 21Mar2017; Last    Rx:21Mar2017 Ordered   18   Xopenex HFA 45 MCG/ACT Inhalation Aerosol; INHALE 2 PUFFS EVERY 4-6 HOURS    AS NEEDED FOR COUGH/ WHEEZE;    Therapy: 45FMI0404 to (Evaluate:13Nov2014)  Requested for: 78Ueu2828; Last    Rx:63Lxm6832 Ordered    Allergies    1  No Known Drug Allergies    Vitals  Signs    Heart Rate: 48  Systolic: 442, LUE, Sitting  Diastolic: 40, LUE, Sitting  Height: 5 ft 2 in  Weight: 137 lb 2 oz  BMI Calculated: 25 08  BSA Calculated: 1 63    Plan  Atrial fibrillation    · EKG/ECG- POC; Status:Complete;   Done: 35TIN0956    Future Appointments    Date/Time Provider Specialty Site   07/27/2017 03:20 PM EL Ruffin  Cardiology [facility]     Signatures   Electronically signed by : Johanna Villarreal Be, ; Jul 7 2017  3:41PM EST                       (Author)    Electronically signed by :  Chloe Goodpasture, M D ; Jul 7 2017  5:10PM EST                       (Author)

## 2018-01-18 NOTE — PROCEDURES
Procedures by Aki Black RN at 8/30/2017  2:49 PM      Author:  Aki Black RN Service:  (none) Author Type:  Registered Nurse    Filed:  8/30/2017  2:51 PM Date of Service:  8/30/2017  2:49 PM Status:  Signed    :  Aki Black RN (Registered Nurse)        Procedure Orders:       1  Insert PICC line [[id number]] ordered by ABHIJIT Padron at 08/30/17 1408                  Insert PICC  line  Date/Time: 8/30/2017 2:49 PM  Performed by: Pilar Webster by: Chen Smith     Patient location:  Bedside  Other Assisting Provider:  Yes (comment) (cari roberts)    Consent:     Consent obtained:  Written    Consent given by:  Patient    Procedural risks discussed: by md     Alternatives discussed: by md   Universal protocol:     Procedure explained and questions answered to patient or proxy's satisfaction: yes      Relevant documents present and verified: yes      Test results available and properly labeled: yes       Imaging studies available: yes      Required blood products, implants, devices, and special equipment available: yes      Site/side marked: yes      Immediately prior to procedure, a time out was called: yes      Patient identity confirmed:  Arm band and verbally with patient  Pre-procedure details:     Hand hygiene: Hand hygiene performed prior to insertion      Sterile barrier technique: All elements of maximal sterile technique followed      Skin preparation:  2% chlorhexidine    Skin preparation agent: Skin preparation agent completely dried prior to procedure    Indications:     PICC line indications comment:  Amio gtt  Anesthesia (see MAR for exact dosages):      Anesthesia method:  Local infiltration    Local anesthetic:  Lidocaine 1% w/o epi  Procedure details:     Location:  Basilic    Vessel type: vein      Laterality:  Left    Approach: percutaneous technique used      Patient position:  Flat    Procedural supplies:  Double lumen Catheter size:  5 Fr    Landmarks identified: yes      Ultrasound guidance: yes      Sterile ultrasound techniques: Sterile gel and sterile probe covers were used      Number of attempts:  1    Successful placement: yes      Vessel of catheter tip end:  Chest Xray needed to confirm placement    Total catheter length (cm):  48    Catheter out on skin (cm):  3    Max flow rate:  999    Arm circumference:  29  Post-procedure details:     Post-procedure:  Dressing applied and securement device placed    Assessment:  Placement verification pending x-ray result    Post-procedure complications: none      Patient tolerance of procedure:   Tolerated well, no immediate complications  Comments:      Pink stickers to lumens                       Received for:Provider  EPIC   Aug 30 2017  2:51PM [facility] Standard Time

## 2018-01-22 VITALS
WEIGHT: 142 LBS | SYSTOLIC BLOOD PRESSURE: 122 MMHG | BODY MASS INDEX: 26.13 KG/M2 | TEMPERATURE: 98.7 F | HEART RATE: 56 BPM | OXYGEN SATURATION: 94 % | HEIGHT: 62 IN | DIASTOLIC BLOOD PRESSURE: 60 MMHG

## 2018-01-22 VITALS
BODY MASS INDEX: 25.23 KG/M2 | SYSTOLIC BLOOD PRESSURE: 134 MMHG | DIASTOLIC BLOOD PRESSURE: 40 MMHG | HEIGHT: 62 IN | HEART RATE: 48 BPM | WEIGHT: 137.13 LBS

## 2019-12-30 NOTE — H&P
bH&P Exam - Trauma   Petros Mendoza 80 y o  female MRN: [id number]  Unit/Bed#: ED 29 Encounter: [id number]    Assessment/Plan   Trauma Alert: Evaluation  Model of Arrival: Ambulance  Trauma Team: Attending Sarai Muhammad and LES [place]  Consultants: Other: IR and vascular surgery    Trauma Active Problems/Plan:   81 y/o female with recent groin instrumentation for cardiac catheterization with retroperitoneal hematoma    Large left sided retroperitoneal hematoma- IR called and recommended vascular surgery evaluation  Vascular surgery recommended CTA Abdomen with IV contrast despite risk of further CRISTIANO, given need to differentiate source of bleeding in order to determine intervention (ie  Stent vs cut down, vs further observation with reversal of INR)  Will obtain CTA and f/u plan with vascular surgery who is present at bedside  Acute blood loss anemia- secondary to [facility]  Transfuse 2 units PRBCs now  ED placing IJ cordis now due to poor peripheral access and need for transfusion  Acute pain due to trauma- tylenol and IV fentanyl as needed for now while NPO    Coumadin coagulapathy- reverse with vitamin K and [address]  Goal INR <1 5  Hold coumadin due to active bleeding  History of atrial fibrillation- hold home aspirin and coumadin given bleeding  Home lopressor on hold presently due to active bleeding  History of severe AS- plans for TAVR in future  Monitor volume status closely  History of CHF- home lasix on hold for now due to bleeding  History of CAD- resume home statin  ASA/BB on hold for now       History of COPD- continue xopenex as needed and home oxygen continuously of 2 liters    History of HTN- hold antihypertensives due to active bleeding     Proph- SCDs, no chemical DVT prophylaxis due to bleeding    Disp- admit to ICU ultimately      Chief Complaint: Left groin pain    History of Present Illness   HPI:  Petros Mendoza is a 80 y o  female who presents with new onset left groin pain after having a cardiac catheterization on 8/21with Dr Diana Lowe  She was being evaluated for possible TAVR by Dr Ghazala Pagan who she saw in the office on 8/24, at which time she was feeling well  She states that her groin pain started early this morning as a pressure like discomfort in the abdomen  She is currently feeling very weak  Denies chest pain/SOB/dizziness, N/V, diarrhea, bloody bowel movements or hematuria  She takes coumadin due to a history of atrial fibrillation  Her daughter in law whom she lives with is present in the room as well  Mechanism:Other: penetrating trauma secondary to cardiac catheterization  Review of Systems   Constitutional:        + generalized weakness   HENT: Negative  Eyes: Negative  Respiratory: Positive for shortness of breath          + admits to chronic SOB and states to be at her baseline  Wears 2 liters oxygen via NC at home   Cardiovascular: Negative  Negative for chest pain  Gastrointestinal:        + left sided groin pain and lower abdominal pain   Endocrine: Negative  Genitourinary: Negative  Musculoskeletal: Negative          + left sided groin pain radiating down the left leg into the heel   Skin: Positive for pallor  Allergic/Immunologic: Negative  Neurological: Negative  Negative for dizziness and light-headedness  Hematological: Negative  Psychiatric/Behavioral: Negative  Historical Information     Past Medical History:   Diagnosis Date    Anemia     Anxiety     Atrial fibrillation     CAD (coronary artery disease)     CHF (congestive heart failure)     COPD (chronic obstructive pulmonary disease)     Depression     Eczema     Hyperlipidemia     Hypertension     Sick sinus syndrome     Vitamin B12 deficiency      History reviewed  No pertinent surgical history    Social History   History   Alcohol Use No     History   Drug Use No     History   Smoking Status    Former Smoker   Smokeless Tobacco    Not on file       There is no immunization history on file for this patient  Last Tetanus: unknown  Family History: Non-contributory    Meds/Allergies   all current active meds have been reviewed and PTA meds:   Prior to Admission Medications   Prescriptions Last Dose Informant Patient Reported? Taking? Umeclidinium Bromide (INCRUSE ELLIPTA IN)   Yes Yes   Sig: Inhale daily   amLODIPine (NORVASC) 2 5 mg tablet   Yes Yes   Sig: Take 2 5 mg by mouth daily   amiodarone 100 mg tablet   No Yes   Sig: Take 1 tablet by mouth every other day   aspirin 81 mg chewable tablet   No Yes   Sig: Chew 1 tablet daily   atorvastatin (LIPITOR) 40 mg tablet   Yes Yes   Sig: Take 40 mg by mouth daily   captopril (CAPOTEN) 25 mg tablet   Yes Yes   Sig: Take 25 mg by mouth 3 (three) times a day   citalopram (CeleXA) 20 mg tablet   Yes Yes   Sig: Take 20 mg by mouth daily   fluticasone furoate-vilanterol (BREO ELLIPTA)   Yes Yes   Sig: Inhale 1 puff   furosemide (LASIX) 20 mg tablet   Yes Yes   Sig: Take 60 mg by mouth 2 (two) times a day   guaiFENesin (MUCINEX) 600 mg 12 hr tablet   Yes Yes   Sig: Take 1,200 mg by mouth daily as needed for cough   levalbuterol (XOPENEX) 1 25 mg/3 mL nebulizer solution   Yes Yes   Sig: Take 1 ampule by nebulization every 4 (four) hours as needed for wheezing   loratadine (CLARITIN) 10 mg tablet   Yes Yes   Sig: Take 10 mg by mouth daily   metoprolol tartrate (LOPRESSOR) 25 mg tablet   Yes Yes   Sig: Take 12 5 mg by mouth daily     potassium chloride (MICRO-K) 10 MEQ CR capsule   Yes Yes   Sig: Take 10 mEq by mouth daily at bedtime   warfarin (COUMADIN) 5 mg tablet   No Yes   Sig: Take 1 tablet by mouth daily for 30 days 5mg everyday except Monday  2 5mg on Monday  Patient taking differently: Take 5 mg by mouth daily 5mg everyday except Monday and friday    2 5mg on Monday and friday       Facility-Administered Medications: None       No Known Allergies      PHYSICAL EXAM    Objective   Vitals:   First set: Temperature: 98 °F (36 7 °C) (08/25/17 1333)  Pulse: 55 (08/25/17 0624)  Respirations: 20 (08/25/17 5063)  Blood Pressure: 141/60 (08/25/17 4840)    Primary Survey:   (A) Airway: intact  (B) Breathing: equal bilaterally  (C) Circulation: Pulses:   normal  (D) Disabliity:  GCS Total:  15  (E) Expose:  Completed    Secondary Survey: (Click on Physical Exam tab above)  Physical Exam   Constitutional: She is oriented to person, place, and time  She appears well-developed and well-nourished  HENT:   Head: Normocephalic  Eyes: Pupils are equal, round, and reactive to light  Neck: Normal range of motion  Neck supple  Cardiovascular: Normal rate  Exam reveals no gallop and no friction rub  No murmur heard  Pulmonary/Chest: Effort normal and breath sounds normal  No respiratory distress  Abdominal: Soft  She exhibits no distension and no mass  There is tenderness  There is guarding  There is no rebound  + abdomen soft and mildly distended  + involuntary guarding with percussion over LLQ  + no rebound or other signs of peritonitis  + bilateral groin access sites without hematomas     Musculoskeletal: Normal range of motion  She exhibits no edema, tenderness or deformity  Neurological: She is alert and oriented to person, place, and time  Skin: Skin is warm and dry  She is not diaphoretic  There is pallor  Psychiatric: She has a normal mood and affect  Invasive Devices     Peripheral Intravenous Line            Peripheral IV 08/25/17 Left Forearm less than 1 day          Line            Arterial Sheath 19643 days    Arterial Sheath 5 Fr   Left Femoral 4 days                Lab Results:   BMP/CMP:   Lab Results   Component Value Date     08/25/2017    K 4 5 08/25/2017     08/25/2017    CO2 33 (H) 08/25/2017    ANIONGAP 5 08/25/2017    BUN 33 (H) 08/25/2017    CREATININE 1 59 (H) 08/25/2017    GLUCOSE 84 08/25/2017    CALCIUM 8 7 08/25/2017    AST 19 08/25/2017    ALT 10 (L) 08/25/2017    ALKPHOS 104 08/25/2017 PROT 5 9 (L) 08/25/2017    ALBUMIN 2 8 (L) 08/25/2017    BILITOT 0 57 08/25/2017    EGFR 30 08/25/2017   , CBC:   Lab Results   Component Value Date    WBC 6 09 08/25/2017    HGB 7 1 (L) 08/25/2017    HCT 22 9 (L) 08/25/2017    MCV 94 08/25/2017     08/25/2017    MCH 29 7 08/25/2017    MCHC 31 7 08/25/2017    RDW 15 3 (H) 08/25/2017    MPV 9 6 08/25/2017    NRBC 0 08/25/2017    and Coagulation:   Lab Results   Component Value Date    INR 2 00 (H) 08/25/2017     Imaging/EKG Studies: Results: I have personally reviewed pertinent films in PACS, Chest X-Ray: small bilateral effusions, CT Scan Abdomen/Pelvis: Large left retroperitoneal hemorrhage     CTA abdomen/pelvis: pending  Other Studies: no new    Code Status: Level 1 - Full Code  Advance Directive and Living Will:      Power of :    POLST: Patient/Caregiver provided printed discharge information.

## 2022-10-27 NOTE — PROGRESS NOTES
Mr Mortimer Beers came in with leg pain and edema and found to have acute renal failure probably related to medication adjustments. Your kidney function improved and was back to normal.  You were resumed on your normal cardiac medications. We recommend that you wear compression stockings. Please follow a low-salt and fluid restriction diet. Please weigh yourself daily and if weight increases by 3 to 5 pounds in 1 day or 1 week please contact your doctor immediately. In addition if there is any issues with shortness of breath or edema worsening please call your doctor immediately. You also were found to have lower extremity infection called cellulitis she will continue on clindamycin for 1 week's time and a prescription was sent to your pharmacy    Please see medication list for medication changes, cilostazol stopped    Use tylenol for pain.  No aleve, ibuprofen    Please see Taz Velarde the nurse practitioner on November 2 at 2:30 PM and Dr. Danisha Mcdonald on November 3 at 11:30 AM as scheduled    You were given your flu and updated COVID-vaccine during your hospitalization    You for allowing us to take care of you    Sincerely    [facility]  P [phone]  F [phone] Progress Note - Critical Care   Jean-Paul Rothman 80 y o  female MRN: [id number]  Unit/Bed#: ICU 12 Encounter: [id number]    Assessment: 80 y o  female w/ L groin hematoma and retroperitoneal bleed s/p cardiac cath; s/p operative repair and groin exploration; Hgb stabilized overnight; on heparin gtt for h/o DVT and mechanical mitral valve    Plan:          Neuro:    - PRN pain control w/ PO meds                 CV: Severe AS, CHF h/o mechanical mitral valve    - monitor volume status and hemodynamics   - Afterload reduction as able   - Re-start home lasix today   - Cont ASA                 Lung:    - Normal respiratory effort   - Maintain O2 >90%   - Pulmonary toilet   - Incentive spirometry                 GI:   - Advance diet as tolerated    - Bowel regimen                 FEN:    - Saline lock   - Replete lytes PRN                   : CKD stage III   - Monitor UOP   - Restart home lasix today                 ID:    - No infectious concerns                  Heme:    - Acute blood loss anemia   - Hgb stable post procedure   - Cont heparin gtt for atrial fibrillation and h/o mechanical mitral valve                  Endo:    - Monitor blood glucose                            Msk/Skin:    - Wound stable w/o hematoma                  Disposition:    - Transition to SD today    HPI/24hr events: L IJ cordis nonfunctioning and removed; Hgb stable 8 5 -> 8 7; UOP marginal; will restart home lasix today    Physical Exam:     Vitals:    17 0500 17 0600 17 0700 17 0722   BP: 125/51 145/65 124/60    Pulse: 56 60 56    Resp: 15 18 14    Temp:       TempSrc:       SpO2: 100% 100% 100% 99%   Weight:       NAD  CV Sinus bradycardia  Pulmonary: Normal effort, O2 sats adequate, clear breath sounds  Abd: Soft, NTND  Wound: groin soft, wound intact, no hematoma            Temperature:   Temp (24hrs), Av 7 °F (37 1 °C), Min:97 4 °F (36 3 °C), Max:99 7 °F (37 6 °C)    Current: Temperature: (!) 97 4 °F (36 3 °C)    Weights:   IBW: -92 5 kg    Body mass index is 24 87 kg/m²  Weight (last 2 days)     Date/Time   Weight    08/25/17 0624  61 7 (136)              Hemodynamic Monitoring:  N/A     Non-Invasive/Invasive Ventilation Settings:  Respiratory    Lab Data (Last 4 hours)    None         O2/Vent Data (Last 4 hours)    None              Lab Results   Component Value Date    PHART 7 415 08/25/2017    [id number] 47 3 (H) 08/25/2017    PO2ART 75 3 08/25/2017    [id number] 29 6 (H) 08/25/2017    BEART 4 5 08/25/2017    SOURCE Brachial, Right 08/25/2017     SpO2: SpO2: 99 %, SpO2 Activity: SpO2 Activity: At Rest    Intake and Outputs:  I/O       08/24 0701 - 08/25 0700 08/25 0701 - 08/26 0700 08/26 0701 - 08/27 0700    P  O   240     I V  (mL/kg)  1057 3 (17 1) 421 9 (6 8)    Blood  700     IV Piggyback  230     Total Intake(mL/kg)  2227 3 (36 1) 421 9 (6 8)    Urine (mL/kg/hr)  790 (0 5)     Total Output   790      Net   +1437 3 +421 9                  Nutrition:        Diet Orders            Start     Ordered    08/25/17 1411  Diet Clear Liquid  Diet effective now     Question Answer Comment   Diet Type Clear Liquid    RD to adjust diet per protocol? No        08/25/17 1411             Labs:     Results from last 7 days  Lab Units 08/26/17  0417 08/26/17  0042 08/25/17  1851 08/25/17  1433 08/25/17  1042  08/25/17  0631   WBC Thousand/uL 7 09  --   --  11 35* 8 16  --  6 09   HEMOGLOBIN g/dL 8 7* 8 5* 9 6* 10 4* 8 4*  < > 8 9*   HEMATOCRIT % 26 0* 25 3* 29 3* 31 6* 27 1*  < > 28 1*   PLATELETS Thousands/uL 143*  --   --  161 180  --  207   NEUTROS PCT % 71  --   --   --   --   --  70   MONOS PCT % 11  --   --   --   --   --  9   < > = values in this interval not displayed     Results from last 7 days  Lab Units 08/26/17  0432 08/25/17  1851 08/25/17  1433  08/25/17  0631   SODIUM mmol/L 144 140 140  < > 142   POTASSIUM mmol/L 4 4 4 8 5 1  < > 4 5   CHLORIDE mmol/L 108 106 106  < > 104   CO2 mmol/L 29 29 28  < > 33*   BUN mg/dL 30* 33* 34*  < > 33*   CREATININE mg/dL 1 51* 1 55* 1 62*  < > 1 59*   CALCIUM mg/dL 7 7* 7 9* 8 0*  < > 8 7   TOTAL PROTEIN g/dL  --   --   --   --  5 9*   BILIRUBIN TOTAL mg/dL  --   --   --   --  0 57   ALK PHOS U/L  --   --   --   --  104   ALT U/L  --   --   --   --  10*   AST U/L  --   --   --   --  19   GLUCOSE RANDOM mg/dL 86 97 118  < > 84   < > = values in this interval not displayed      Results from last 7 days  Lab Units 08/26/17  0432 08/21/17  0719   MAGNESIUM mg/dL 2 3 2 6       Results from last 7 days  Lab Units 08/26/17  0432   PHOSPHORUS mg/dL 3 5        Results from last 7 days  Lab Units 08/26/17  0440 08/26/17  0401 08/25/17  2053 08/25/17  1851 08/25/17  1433 08/25/17  1043   INR  1 09  --   --   --  1 09 2 15*   PTT seconds  --  102* 58* 52* 28  --        Results from last 7 days  Lab Units 08/25/17  1043   LACTIC ACID mmol/L 1 0       0  Lab Value Date/Time   TROPONINI 0 21 (H) 04/21/2017 0104   TROPONINI 0 23 (H) 04/20/2017 2217   TROPONINI 0 07 (H) 06/21/2015 1038   TROPONINI 0 04 06/21/2015 0242   TROPONINI 0 06 (H) 06/20/2015 1845   TROPONINI 0 09 (H) 06/19/2015 2316   TROPONINI 0 13 (H) 06/19/2015 1320   TROPONINI 0 10 (H) 05/11/2015 0525   TROPONINI 0 22 (H) 05/11/2015 0031   TROPONINI 0 20 (H) 05/10/2015 1705   TROPONINI <0 04 01/03/2015 0015   TROPONINI <0 04 01/02/2015 1546   TROPONINI <0 04 01/02/2015 0820         Micro:  No results found for: Hilliard Halter, WOUNDCULT, SPUTUMCULTUR    Allergies: No Known Allergies    Medications:   Scheduled Meds:  acetaminophen 650 mg Oral Q6H [address]   amiodarone 100 mg Oral Every Other Day   aspirin 81 mg Oral Daily   atorvastatin 40 mg Oral Daily   cefazolin 1,000 mg Intravenous Q8H   citalopram 20 mg Oral Daily   metoprolol tartrate 12 5 mg Oral Daily     Continuous Infusions:  heparin (porcine) 3-20 Units/kg/hr (Order-Specific) Last Rate: 12 Units/kg/hr (08/26/17 0559)   sodium chloride 50 mL/hr Last Rate: 50 mL/hr (08/26/17 0735)   sodium chloride 75 mL/hr Last Rate: Stopped (08/25/17 1801)     PRN Meds:    fentanyl citrate (PF) 25 mcg Q2H PRN   guaiFENesin 1,200 mg Daily PRN   levalbuterol 1 25 mg Q6H PRN   sodium chloride 3 mL Q6H PRN       VTE Pharmacologic Prophylaxis: Heparin  VTE Mechanical Prophylaxis: sequential compression device    Invasive lines and devices: Invasive Devices     Peripheral Intravenous Line            Peripheral IV 08/25/17 Left Forearm 1 day    Peripheral IV 08/25/17 Right Forearm less than 1 day          Line            Arterial Sheath 12128 days    Arterial Sheath 5 Fr  Left Femoral 4 days          Drain            Urethral Catheter Latex 16 Fr  less than 1 day                    Code Status: Level 1 - Full Code     Portions of the record may have been created with voice recognition software  Occasional wrong word or "sound a like" substitutions may have occurred due to the inherent limitations of voice recognition software  Read the chart carefully and recognize, using context, where substitutions have occurred       Kirill Petersen MD

## (undated) DEVICE — SUT PROLENE 5-0 BV-1 24 IN 9702H

## (undated) DEVICE — SOFT SILICONE HYDROCELLULAR FOAM DRESSING WITH LOCK AWAY LAYER: Brand: ALLEVYN LIFE M 12.9X12.9 CTN10

## (undated) DEVICE — REM POLYHESIVE ADULT PATIENT RETURN ELECTRODE: Brand: VALLEYLAB

## (undated) DEVICE — CHLORAPREP HI-LITE 26ML ORANGE

## (undated) DEVICE — 3000CC GUARDIAN II: Brand: GUARDIAN

## (undated) DEVICE — 3M™ IOBAN™ 2 ANTIMICROBIAL INCISE DRAPE 6650EZ: Brand: IOBAN™ 2

## (undated) DEVICE — SUT PROLENE 6-0 BV-1/BV-1 24 IN 8805H

## (undated) DEVICE — BAG DECANTER

## (undated) DEVICE — SUT MONOCRYL 3-0 SH 27 IN Y316H

## (undated) DEVICE — TRAY FOLEY 16FR URIMETER SURESTEP

## (undated) DEVICE — SUT MONOCRYL 3-0 SH 27 IN Y416H

## (undated) DEVICE — SURGICEL FIBRILLAR 1 X 2

## (undated) DEVICE — GLOVE SRG BIOGEL 7.5

## (undated) DEVICE — DRAPE SURGIKIT SADDLE BAG

## (undated) DEVICE — SUT SILK 4-0 18 IN A183H

## (undated) DEVICE — INTENDED FOR TISSUE SEPARATION, AND OTHER PROCEDURES THAT REQUIRE A SHARP SURGICAL BLADE TO PUNCTURE OR CUT.: Brand: BARD-PARKER ® CARBON RIB-BACK BLADES

## (undated) DEVICE — SUT MONOCRYL 4-0 PS-2 18 IN Y496G

## (undated) DEVICE — SUT SILK 2-0 18 IN A185H

## (undated) DEVICE — STERILE FEM POP PACK: Brand: CARDINAL HEALTH

## (undated) DEVICE — PROXIMATE PLUS MD MULTI-DIRECTIONAL RELEASE SKIN STAPLERS CONTAINS 35 STAINLESS STEEL STAPLES APPROXIMATE CLOSED DIMENSIONS: 6.9MM X 3.9MM WIDE: Brand: PROXIMATE

## (undated) DEVICE — INTENDED FOR TISSUE SEPARATION, AND OTHER PROCEDURES THAT REQUIRE A SHARP SURGICAL BLADE TO PUNCTURE OR CUT.: Brand: BARD-PARKER SAFETY BLADES SIZE 15, STERILE

## (undated) DEVICE — SUT PROLENE 3-0 SH 30 IN 8832H

## (undated) DEVICE — 1200CC GUARDIAN II: Brand: GUARDIAN